# Patient Record
Sex: FEMALE | Race: WHITE | NOT HISPANIC OR LATINO | Employment: UNEMPLOYED | URBAN - METROPOLITAN AREA
[De-identification: names, ages, dates, MRNs, and addresses within clinical notes are randomized per-mention and may not be internally consistent; named-entity substitution may affect disease eponyms.]

---

## 2017-02-27 ENCOUNTER — ALLSCRIPTS OFFICE VISIT (OUTPATIENT)
Dept: OTHER | Facility: OTHER | Age: 22
End: 2017-02-27

## 2017-02-27 LAB — S PYO AG THROAT QL: NEGATIVE

## 2017-02-28 LAB — S PYO AG THROAT QL: NEGATIVE

## 2017-03-21 ENCOUNTER — ALLSCRIPTS OFFICE VISIT (OUTPATIENT)
Dept: OTHER | Facility: OTHER | Age: 22
End: 2017-03-21

## 2017-06-01 ENCOUNTER — ALLSCRIPTS OFFICE VISIT (OUTPATIENT)
Dept: OTHER | Facility: OTHER | Age: 22
End: 2017-06-01

## 2017-08-25 ENCOUNTER — ALLSCRIPTS OFFICE VISIT (OUTPATIENT)
Dept: OTHER | Facility: OTHER | Age: 22
End: 2017-08-25

## 2017-08-25 DIAGNOSIS — F41.9 ANXIETY DISORDER: ICD-10-CM

## 2017-08-25 DIAGNOSIS — E55.9 VITAMIN D DEFICIENCY: ICD-10-CM

## 2017-08-25 DIAGNOSIS — R51 HEADACHE(784.0): ICD-10-CM

## 2017-08-25 DIAGNOSIS — G43.019 INTRACTABLE MIGRAINE WITHOUT AURA AND WITHOUT STATUS MIGRAINOSUS: ICD-10-CM

## 2017-08-25 DIAGNOSIS — G47.00 INSOMNIA: ICD-10-CM

## 2017-08-25 DIAGNOSIS — G43.709 CHRONIC MIGRAINE WITHOUT AURA WITHOUT STATUS MIGRAINOSUS, NOT INTRACTABLE: ICD-10-CM

## 2017-08-26 ENCOUNTER — TRANSCRIBE ORDERS (OUTPATIENT)
Dept: LAB | Facility: HOSPITAL | Age: 22
End: 2017-08-26

## 2017-08-26 ENCOUNTER — APPOINTMENT (OUTPATIENT)
Dept: LAB | Facility: HOSPITAL | Age: 22
End: 2017-08-26
Attending: PSYCHIATRY & NEUROLOGY
Payer: COMMERCIAL

## 2017-08-26 DIAGNOSIS — G47.00 INSOMNIA: ICD-10-CM

## 2017-08-26 DIAGNOSIS — G43.709 CHRONIC MIGRAINE WITHOUT AURA WITHOUT STATUS MIGRAINOSUS, NOT INTRACTABLE: ICD-10-CM

## 2017-08-26 DIAGNOSIS — E55.9 VITAMIN D DEFICIENCY: ICD-10-CM

## 2017-08-26 DIAGNOSIS — F41.9 ANXIETY DISORDER: ICD-10-CM

## 2017-08-26 DIAGNOSIS — R51 HEADACHE(784.0): ICD-10-CM

## 2017-08-26 DIAGNOSIS — G43.019 INTRACTABLE MIGRAINE WITHOUT AURA AND WITHOUT STATUS MIGRAINOSUS: ICD-10-CM

## 2017-08-26 LAB
25(OH)D3 SERPL-MCNC: 26.2 NG/ML (ref 30–100)
HCYS SERPL-SCNC: 12 UMOL/L (ref 3.7–11.2)
TSH SERPL DL<=0.05 MIU/L-ACNC: 1.27 UIU/ML (ref 0.36–3.74)
VIT B12 SERPL-MCNC: 261 PG/ML (ref 100–900)

## 2017-08-26 PROCEDURE — 82607 VITAMIN B-12: CPT

## 2017-08-26 PROCEDURE — 84443 ASSAY THYROID STIM HORMONE: CPT

## 2017-08-26 PROCEDURE — 82306 VITAMIN D 25 HYDROXY: CPT

## 2017-08-26 PROCEDURE — 36415 COLL VENOUS BLD VENIPUNCTURE: CPT

## 2017-08-26 PROCEDURE — 83090 ASSAY OF HOMOCYSTEINE: CPT

## 2018-01-12 VITALS
DIASTOLIC BLOOD PRESSURE: 64 MMHG | OXYGEN SATURATION: 98 % | HEART RATE: 101 BPM | SYSTOLIC BLOOD PRESSURE: 124 MMHG | HEIGHT: 65 IN

## 2018-01-13 VITALS
DIASTOLIC BLOOD PRESSURE: 70 MMHG | OXYGEN SATURATION: 99 % | HEART RATE: 84 BPM | SYSTOLIC BLOOD PRESSURE: 110 MMHG | WEIGHT: 186.38 LBS | BODY MASS INDEX: 31.05 KG/M2 | HEIGHT: 65 IN

## 2018-01-29 ENCOUNTER — OFFICE VISIT (OUTPATIENT)
Dept: NEUROLOGY | Facility: CLINIC | Age: 23
End: 2018-01-29
Payer: COMMERCIAL

## 2018-01-29 VITALS
HEART RATE: 104 BPM | SYSTOLIC BLOOD PRESSURE: 139 MMHG | RESPIRATION RATE: 14 BRPM | WEIGHT: 175 LBS | BODY MASS INDEX: 29.88 KG/M2 | HEIGHT: 64 IN | DIASTOLIC BLOOD PRESSURE: 77 MMHG

## 2018-01-29 DIAGNOSIS — G47.09 OTHER INSOMNIA: ICD-10-CM

## 2018-01-29 DIAGNOSIS — G43.709 CHRONIC MIGRAINE WITHOUT AURA WITHOUT STATUS MIGRAINOSUS, NOT INTRACTABLE: Primary | ICD-10-CM

## 2018-01-29 PROCEDURE — 99214 OFFICE O/P EST MOD 30 MIN: CPT | Performed by: PHYSICIAN ASSISTANT

## 2018-01-29 NOTE — PATIENT INSTRUCTIONS
Preventative: If headache start to worsen again, you will restart the magnesium 500 mg a day in the B2 400 mg a day  However, since you only have 1-2 migraines a month, you do not need to be on a preventative medication at this time    Supportive: May use Tylenol at the onset of a headache  Limit to less than 3 times a week  If this becomes less effective, please call us to try something else    Melatonin as needed for sleep    The patient was counseled regarding:  Discussed side effects of all medications prescribed today to the patient in detail  Patient education was completed today and we also discussed precautions for rebound headaches  When patient has a moderate to severe headache, they should seek rest, initiate relaxation and apply cold compresses to the head  1  Maintain regular sleep schedule  Adults need at least 7-8 hours of uninterrupted a night  2  Limit over the counter medications such as Tylenol, Ibuprofen, Aleve, Excedrin  (No more than 3 times a week)  3  Maintain headache diary  We discussed an ANITRA for a smart phone is "Migraine eDiary"  4  Limit caffeine to 1-2 cups a day or less  5  Avoid dietary trigger  (list given to the patient and reviewed with them)  6  Patient is to have regular frequent meals to prevent headache onset  6   Please drink at least 64 ounces of water a day to help remain hydrated  In addition we reviewed that some over-the-counter supplements may be used to decrease migraines and intensity of migraines  1   Magnesium Oxide 400-500 mg a day  If any diarrhea or upset stomach, decrease dose as tolerated  2  B2 400 mg a day  May take once a day or take 200 mg twice a day    We did discuss that this supplement will change the color of the urine to fluorescent yellow no matter how hydrated

## 2018-01-29 NOTE — ASSESSMENT & PLAN NOTE
Preventative: If headache start to worsen again, you will restart the magnesium 500 mg a day in the B2 400 mg a day  However, since you only have 1-2 migraines a month, you do not need to be on a preventative medication at this time    Supportive: May use Tylenol at the onset of a headache  Limit to less than 3 times a week      If this becomes less effective, please call us to try something else

## 2018-01-29 NOTE — PROGRESS NOTES
Patient ID: Divine Howell is a 25 y o  female  Assessment/Plan:    Chronic migraine without aura, not intractable  Preventative: If headache start to worsen again, you will restart the magnesium 500 mg a day in the B2 400 mg a day  However, since you only have 1-2 migraines a month, you do not need to be on a preventative medication at this time    Supportive: May use Tylenol at the onset of a headache  Limit to less than 3 times a week  If this becomes less effective, please call us to try something else    Other insomnia   Use melatonin as needed to help with falling asleep  May increase up to 5 mg if needed  Use only as needed         Problem List Items Addressed This Visit        Cardiovascular and Mediastinum    Chronic migraine without aura, not intractable - Primary     Preventative: If headache start to worsen again, you will restart the magnesium 500 mg a day in the B2 400 mg a day  However, since you only have 1-2 migraines a month, you do not need to be on a preventative medication at this time    Supportive: May use Tylenol at the onset of a headache  Limit to less than 3 times a week  If this becomes less effective, please call us to try something else            Other    Other insomnia      Use melatonin as needed to help with falling asleep  May increase up to 5 mg if needed  Use only as needed                Follow up in 2 years  Call with any changes or worsening    Subjective:    HPI    We had the pleasure of evaluating Kota Bains in neurological follow up today  As you know she is a 25year old right handed female  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[employer] and now want to go into education and is in a Master's program at [employer]  Headaches: Small patch of pain on the left parietal region   She noted she could not sleep on that side and could not pull her hair up and if she touched it by mistake she would could tingling sensation  It lasted for a month and then dissipated  After last visit, did take the Magnesium and B2 which decreased her heaches to 1-2 a month  She stopped the medications and headaches remained the same    Any family history of aneurysms - none  Anyhistory of migraine headaches - maternal grandmother   What is your current pain level - 0/10  Headaches started at what age -Middle school but her severe headaches started to get more frequent in March of 2017  Any accident or injury prior to onset of headaches - none  How often do the headaches occur - 1-2 month  What time of the day do the headaches start - varies  How long do the headaches last - 6 hours  Are you ever headache free - yes  Where are they located - it can be anywhere  What is the intensity of pain - 4-5 and 8/10  Any warning prior to headache and how long do they last - none  Describe your usual headache - Throbbing, Pounding    Associated symptoms:            Decrease of appetite, nausea            Photophobia, phonophobia           Problem with concentration           light-headed or dizzy, stiff or sore neck,            prefer to be alone and in a dark room, unable to work  Headaches are worse if the patient: cough, sneeze, bending over  Any trigger: not sure  Are you current pregnant or planning on getting pregnant? Not at this time  Any time of the year do headaches occur more frequently - not sure    Have you seen someone else for headaches or pain - pcp  Have you had trigger point injection performed and how often - no  Have you had Botox injection performed and how often - no  Have you had epidural injections or transforaminal injections performed - no  What medications do you take or have you taken for your headaches?   Preventative: Magnesium, B2  Abortive: Tylenol, Compazine  Any Medical/Alternative Treatments used in the past for headaches: none       The following portions of the patient's history were reviewed and updated as appropriate: allergies, current medications, past family history, past medical history, past social history, past surgical history and problem list          Objective:    Blood pressure 139/77, pulse 104, resp  rate 14, height 5' 4" (1 626 m), weight 79 4 kg (175 lb)  Physical Exam   Constitutional: She appears well-developed and well-nourished  HENT:   Head: Normocephalic and atraumatic  Eyes: EOM are normal  Pupils are equal, round, and reactive to light  Neck: Normal range of motion  Cardiovascular: Normal rate  Pulmonary/Chest: Effort normal    Musculoskeletal: Normal range of motion  Neurological: She has normal strength  Gait and coordination normal    Reflex Scores:       Bicep reflexes are 2+ on the right side and 2+ on the left side  Brachioradialis reflexes are 2+ on the right side and 2+ on the left side  Patellar reflexes are 2+ on the right side and 3+ on the left side  Achilles reflexes are 2+ on the right side and 2+ on the left side  Skin: Skin is warm and dry  Psychiatric: She has a normal mood and affect  Her speech is normal    Nursing note and vitals reviewed  Neurological Exam    Mental Status  The patient is alert and oriented to person, place, time, and situation  Her recent and remote memory are normal  She has no visuospatial neglect  Her speech is normal  Her language is fluent with no aphasia  She has normal attention span and concentration  She has a normal fund of knowledge  Cranial Nerves    CN II: The patient's visual acuity and visual fields are normal   CN III, IV, VI: The patient's pupils are equally round and reactive to light and ocular movements are normal   CN V: The patient has normal facial sensation  CN VII:  The patient has symmetric facial movement  CN VIII:  The patient's hearing is normal   CN IX, X: The patient has symmetric palate movement and normal gag reflex    CN XI: The patient's shoulder shrug strength is normal   CN XII: The patient's tongue is midline without atrophy or fasciculations  Motor  The patient has normal muscle bulk throughout  Her overall muscle tone is normal throughout  Her strength is 5/5 throughout all four extremities  Sensory  The patient's sensation is normal in all four extremities  She has normal cortical sensation    Reflexes  Deep tendon reflexes are 2+ and symmetric except as noted  Right                     Left  Brachioradialis                      2+                         2+  Biceps                                   2+                         2+  Patellar                                 2+                         3+  Achilles                                2+                         2+    Gait and Coordination  The patient has normal gait and station and normal casual, toe, heel, and tandem gait  She has normal coordination bilaterally  ROS:    Review of Systems   Constitutional: Negative  Negative for appetite change and fever  HENT: Negative  Negative for hearing loss, tinnitus, trouble swallowing and voice change  Eyes: Negative  Negative for photophobia and pain  Respiratory: Negative  Negative for shortness of breath  Cardiovascular: Negative  Negative for palpitations  Gastrointestinal: Negative  Negative for nausea and vomiting  Endocrine: Negative  Negative for cold intolerance and heat intolerance  Genitourinary: Negative  Negative for dysuria, frequency and urgency  Musculoskeletal: Negative  Negative for myalgias and neck pain  Skin: Negative  Negative for rash  Neurological: Positive for headaches  Negative for dizziness, tremors, seizures, syncope, facial asymmetry, speech difficulty, weakness, light-headedness and numbness  Hematological: Negative  Does not bruise/bleed easily  Psychiatric/Behavioral: Negative  Negative for confusion, hallucinations and sleep disturbance

## 2018-01-29 NOTE — ASSESSMENT & PLAN NOTE
Use melatonin as needed to help with falling asleep  May increase up to 5 mg if needed    Use only as needed

## 2018-08-14 ENCOUNTER — TELEPHONE (OUTPATIENT)
Dept: INTERNAL MEDICINE CLINIC | Facility: CLINIC | Age: 23
End: 2018-08-14

## 2018-08-21 ENCOUNTER — CLINICAL SUPPORT (OUTPATIENT)
Dept: INTERNAL MEDICINE CLINIC | Facility: CLINIC | Age: 23
End: 2018-08-21
Payer: COMMERCIAL

## 2018-08-21 DIAGNOSIS — Z11.1 SCREENING FOR TUBERCULOSIS: Primary | ICD-10-CM

## 2018-08-23 ENCOUNTER — CLINICAL SUPPORT (OUTPATIENT)
Dept: INTERNAL MEDICINE CLINIC | Facility: CLINIC | Age: 23
End: 2018-08-23
Payer: COMMERCIAL

## 2018-08-23 DIAGNOSIS — Z11.1 ENCOUNTER FOR PPD SKIN TEST READING: Primary | ICD-10-CM

## 2018-08-23 LAB
INDURATION: 0 MM
TB SKIN TEST: NEGATIVE

## 2018-08-23 PROCEDURE — 86580 TB INTRADERMAL TEST: CPT

## 2019-01-24 ENCOUNTER — OFFICE VISIT (OUTPATIENT)
Dept: INTERNAL MEDICINE CLINIC | Facility: CLINIC | Age: 24
End: 2019-01-24
Payer: COMMERCIAL

## 2019-01-24 VITALS
OXYGEN SATURATION: 98 % | SYSTOLIC BLOOD PRESSURE: 112 MMHG | DIASTOLIC BLOOD PRESSURE: 64 MMHG | HEART RATE: 97 BPM | BODY MASS INDEX: 29.53 KG/M2 | TEMPERATURE: 99.2 F | WEIGHT: 173 LBS | HEIGHT: 64 IN

## 2019-01-24 DIAGNOSIS — R10.13 DYSPEPSIA: Primary | ICD-10-CM

## 2019-01-24 DIAGNOSIS — G43.709 CHRONIC MIGRAINE WITHOUT AURA WITHOUT STATUS MIGRAINOSUS, NOT INTRACTABLE: ICD-10-CM

## 2019-01-24 PROCEDURE — 3008F BODY MASS INDEX DOCD: CPT | Performed by: INTERNAL MEDICINE

## 2019-01-24 PROCEDURE — 99214 OFFICE O/P EST MOD 30 MIN: CPT | Performed by: INTERNAL MEDICINE

## 2019-01-24 NOTE — PROGRESS NOTES
Assessment/Plan: This could be viral or a reaction to something she are recently  Discussed a trial of Zantac 75mg BID for the next week  Ulcer free diet discussed  Call If not improving  She does not take NSAIDs for her migraines, only Tylenol which she has not needed to take in a while until yesterday     Problem List Items Addressed This Visit        Cardiovascular and Mediastinum    Chronic migraine without aura, not intractable      Other Visit Diagnoses     Dyspepsia    -  Primary            Subjective:      Patient ID: Imelda Ulloa is a 21 y o  female  HPI  2 days ago experienced a sharp pain over umbilicus lasting a few seconds  The same pain work her up the following morning which has continued, comes in waves  +nausea worse with food  No vomiting  Bowel movements are regular, no blood in the stool  Gets occasional heartburn  LMP after the New Year  She has not had migraines in a while but had one last night and took Tylenol  Started tutoring classes 2 days ago but denies feeling stressed/anxious about it  Denies changes in her diet    The following portions of the patient's history were reviewed and updated as appropriate: allergies, past family history, past medical history, past social history, past surgical history and problem list     Review of Systems   Constitutional: Negative for fatigue, fever and unexpected weight change  HENT: Negative for ear pain, hearing loss, sinus pain, sinus pressure and sore throat  Respiratory: Negative for cough, shortness of breath and wheezing  Cardiovascular: Negative for chest pain, palpitations and leg swelling  Gastrointestinal: Positive for abdominal pain and nausea  Negative for constipation, diarrhea and vomiting  See hpi   Genitourinary: Negative for difficulty urinating and dysuria  Neurological: Positive for headaches  Negative for dizziness           Objective:      /64   Pulse 97   Temp 99 2 °F (37 3 °C)   Ht 5' 4" (1 626 m)   Wt 78 5 kg (173 lb)   SpO2 98%   BMI 29 70 kg/m²          Physical Exam   Constitutional: She is oriented to person, place, and time  She appears well-developed and well-nourished  HENT:   Head: Normocephalic and atraumatic  Right Ear: External ear normal    Left Ear: External ear normal    Mouth/Throat: Oropharynx is clear and moist    Eyes: Conjunctivae are normal    Neck: Neck supple  Cardiovascular: Normal rate, regular rhythm and normal heart sounds  No murmur heard  Pulmonary/Chest: Effort normal and breath sounds normal  No respiratory distress  She has no wheezes  She has no rales  Abdominal: Soft  Bowel sounds are normal  She exhibits no distension and no mass  There is no tenderness  There is no rebound and no guarding  Musculoskeletal: Normal range of motion  Neurological: She is alert and oriented to person, place, and time  Skin: Skin is warm and dry  Psychiatric: She has a normal mood and affect   Her behavior is normal  Judgment and thought content normal

## 2019-09-17 ENCOUNTER — OFFICE VISIT (OUTPATIENT)
Dept: URGENT CARE | Facility: CLINIC | Age: 24
End: 2019-09-17
Payer: COMMERCIAL

## 2019-09-17 VITALS
SYSTOLIC BLOOD PRESSURE: 130 MMHG | DIASTOLIC BLOOD PRESSURE: 84 MMHG | TEMPERATURE: 98.5 F | WEIGHT: 179.8 LBS | OXYGEN SATURATION: 99 % | BODY MASS INDEX: 30.7 KG/M2 | HEIGHT: 64 IN | RESPIRATION RATE: 18 BRPM | HEART RATE: 106 BPM

## 2019-09-17 DIAGNOSIS — R05.9 COUGH: Primary | ICD-10-CM

## 2019-09-17 PROCEDURE — 99213 OFFICE O/P EST LOW 20 MIN: CPT | Performed by: PHYSICIAN ASSISTANT

## 2019-09-17 RX ORDER — PREDNISONE 10 MG/1
TABLET ORAL
Qty: 21 TABLET | Refills: 0 | Status: SHIPPED | OUTPATIENT
Start: 2019-09-17 | End: 2020-05-27 | Stop reason: ALTCHOICE

## 2019-09-17 NOTE — PROGRESS NOTES
[facility]        NAME: Armando Brown is a 25 y o  female  : 1995    MRN: [id number]  DATE: 2019  TIME: 5:04 PM    Assessment and Plan   Cough [R05]  1  Cough  predniSONE 10 mg tablet       Patient Instructions     Take prednisone as directed with food  Follow up with PCP in 3-5 days  Proceed to  ER if symptoms worsen or fever/chills/night sweats develop    Chief Complaint     Chief Complaint   Patient presents with    Shortness of Breath     c/o getting sick 2 weeks ago (was not seen at the time, fever of 100 degrees, sore throat, productive cough), states seems like she was getting better but still with chest congestion and gets SOB with deep breathing, feels pressure, tried Mucinex with little relief, cough is NP  OTC meds used Dayquil  History of Present Illness       Patient presents with complaint of dry cough following URI a few weeks ago  Pt states that her only associated symptoms at this time is mild generalized chest tightness and feeling like she cannot take a full deep breath  Pt denies fever, chills, night sweats, chest pain, abdominal symptoms, congestion, rhinorrhea, PND, ear pain, and sore throat  Pt states that she otherwise feels well  Pt reports having hay fever but denies history of asthma  Pt reports taking mucinex and dayquil but states that they have not improved her dry cough  Review of Systems   Review of Systems   Constitutional: Negative for chills, fatigue and fever  Respiratory: Positive for cough and chest tightness  Negative for shortness of breath and wheezing  Cardiovascular: Negative for chest pain and palpitations  Musculoskeletal: Negative for myalgias  Skin: Negative for color change, rash and wound  Neurological: Negative for headaches  All other systems reviewed and are negative          Current Medications       Current Outpatient Medications:     predniSONE 10 mg tablet, Take 6 tabs on day 1, 5 tabs on day 2, 4 tabs on day 3, 3 tabs on day 4, 2 tabs on day 5, and 1 tab on day 6, Disp: 21 tablet, Rfl: 0    Current Allergies     Allergies as of 09/17/2019 - Reviewed 09/17/2019   Allergen Reaction Noted    Other Allergic Rhinitis 03/11/2013    Penicillins Rash 03/11/2013            The following portions of the patient's history were reviewed and updated as appropriate: allergies, current medications, past family history, past medical history, past social history, past surgical history and problem list      History reviewed  No pertinent past medical history  Past Surgical History:   Procedure Laterality Date    FOOT SURGERY      OTHER SURGICAL HISTORY      L Foot Sx       Family History   Problem Relation Age of Onset    Diabetes Father     Thyroid disease Paternal Grandfather     Cancer Paternal Grandfather          Medications have been verified  Objective   /84 (BP Location: Right arm)   Pulse (!) 106   Temp 98 5 °F (36 9 °C) (Tympanic)   Resp 18   Ht 5' 4" (1 626 m)   Wt 81 6 kg (179 lb 12 8 oz)   LMP 09/01/2019 (Within Days)   SpO2 99%   BMI 30 86 kg/m²        Physical Exam     Physical Exam   Constitutional: She is oriented to person, place, and time  She appears well-developed and well-nourished  Non-toxic appearance  She does not appear ill  No distress  Appears well, friendly, conversational   HENT:   Head: Normocephalic and atraumatic  Mouth/Throat: Oropharynx is clear and moist  No oropharyngeal exudate or posterior oropharyngeal edema  Eyes: Pupils are equal, round, and reactive to light  Conjunctivae and EOM are normal    Neck: Normal range of motion  Neck supple  No tracheal deviation present  No thyromegaly present  Cardiovascular: Normal rate, regular rhythm and normal heart sounds  Pulmonary/Chest: Effort normal and breath sounds normal  No accessory muscle usage  No respiratory distress  She has no decreased breath sounds  She has no wheezes   She has no rhonchi  She has no rales  She exhibits no mass, no tenderness, no laceration and no crepitus  Musculoskeletal: Normal range of motion  Right lower leg: Normal         Left lower leg: Normal    Lymphadenopathy:     She has no cervical adenopathy  Neurological: She is alert and oriented to person, place, and time  No cranial nerve deficit or sensory deficit  Skin: Skin is warm and dry  Capillary refill takes less than 2 seconds  No rash noted  She is not diaphoretic  No erythema  Psychiatric: She has a normal mood and affect  Her behavior is normal  Thought content normal    Nursing note and vitals reviewed

## 2020-01-28 ENCOUNTER — TELEPHONE (OUTPATIENT)
Dept: NEUROLOGY | Facility: CLINIC | Age: 25
End: 2020-01-28

## 2020-01-28 NOTE — TELEPHONE ENCOUNTER
Lmom for pt to call back, re: appt opening in [facility]/ Jose Eduardo for 1/29/20 @ 8:15am or 10:30 am

## 2020-02-26 ENCOUNTER — OFFICE VISIT (OUTPATIENT)
Dept: URGENT CARE | Facility: CLINIC | Age: 25
End: 2020-02-26
Payer: COMMERCIAL

## 2020-02-26 VITALS
RESPIRATION RATE: 19 BRPM | HEIGHT: 64 IN | TEMPERATURE: 98.9 F | OXYGEN SATURATION: 99 % | DIASTOLIC BLOOD PRESSURE: 91 MMHG | HEART RATE: 112 BPM | BODY MASS INDEX: 31.58 KG/M2 | SYSTOLIC BLOOD PRESSURE: 151 MMHG | WEIGHT: 185 LBS

## 2020-02-26 DIAGNOSIS — H10.32 ACUTE CONJUNCTIVITIS OF LEFT EYE, UNSPECIFIED ACUTE CONJUNCTIVITIS TYPE: Primary | ICD-10-CM

## 2020-02-26 PROCEDURE — 99213 OFFICE O/P EST LOW 20 MIN: CPT | Performed by: PHYSICIAN ASSISTANT

## 2020-02-26 RX ORDER — POLYMYXIN B SULFATE AND TRIMETHOPRIM 1; 10000 MG/ML; [USP'U]/ML
1 SOLUTION OPHTHALMIC EVERY 4 HOURS
Qty: 10 ML | Refills: 0 | Status: SHIPPED | OUTPATIENT
Start: 2020-02-26 | End: 2020-03-02

## 2020-02-26 NOTE — PROGRESS NOTES
[facility]        NAME: Nicolle Curran is a 25 y o  female  : 1995    MRN: [id number]  DATE: 2020  TIME: 7:06 PM    Assessment and Plan   Acute conjunctivitis of left eye, unspecified acute conjunctivitis type [H10 32]  1  Acute conjunctivitis of left eye, unspecified acute conjunctivitis type  polymyxin b-trimethoprim (POLYTRIM) ophthalmic solution     Continue warm compresses and massage  See eye doctor if symptoms do not resolve with the eye drops  Patient Instructions   Patient Instructions   Blocked Tear Duct   WHAT YOU NEED TO KNOW:   The tear duct is a small tube that helps your eye drain  A blocked tear duct means your tears do not drain correctly  When the tear duct becomes blocked, you may be at higher risk for eye infections  DISCHARGE INSTRUCTIONS:   Return to the emergency department if:   · The swelling spreads to your cheek or nose  · You have trouble breathing  Contact your healthcare provider or ophthalmologist if:   · You have a red or blue bump on the inside corner of your eye  · The white part of your eye is red  · Your eye starts draining more pus  · Your eye does not improve with treatment  · You have questions or concerns about your condition or care  Clean and massage your eye 2 to 3 times every day or as directed:  Massage helps unblock the tear duct  This can decrease pain and swelling, and prevent an eye infection:  · Wash your hands  · Wet a soft washcloth with warm water  Gently wipe any pus or dried crust out of your eye  · Place a warm compress on your eye  A warm compress can help decrease pain  It can also make it easier to unblock the tear duct  Use a small towel or gauze dipped in warm water  Leave the compress in place for 5 minutes  · Place your index finger on the side of your nose, near your eye  Use a mirror to help you find the correct place       · Press gently and slide your finger down toward the corner of your nose  You may see pus or fluid drain from the inside corner of your eye  This is normal      · Wipe away any pus or fluid that drains from the eye  Wash your hands  Follow up with your healthcare provider or ophthalmologist as directed:  Write down your questions so you remember to ask them during your visits  © 2017 [employer] Information is for End User's use only and may not be sold, redistributed or otherwise used for commercial purposes  All illustrations and images included in CareNotes® are the copyrighted property of [employer]  or [employer]esto [employer]  The above information is an  only  It is not intended as medical advice for individual conditions or treatments  Talk to your doctor, nurse or pharmacist before following any medical regimen to see if it is safe and effective for you  Proceed to  ER if symptoms worsen  Chief Complaint     Chief Complaint   Patient presents with    Eye Problem     Pt has eye irritation in the left eye, She states she had a blocked tear duct several years ago  She is experiencing same symptoms for the past two weeks where it is also consistently tearing and has discharge  History of Present Illness       Patient presents for evaluation of left eye irritation, tearing and crusting for the past 2 weeks  Patient denies any injury to the eye  She is not wearing contacts currently  She reports she had history of blocked tear duct in the past in some of the symptoms seem similar  She denies any eye pain, photophobia, headache, dizziness, fever  Review of Systems   Review of Systems   Constitutional: Negative for chills and fever  HENT: Negative  Eyes: Positive for discharge and redness  Negative for photophobia, pain, itching and visual disturbance  Respiratory: Negative  Cardiovascular: Negative  Skin: Negative  Neurological: Negative            Current Medications Current Outpatient Medications:     polymyxin b-trimethoprim (POLYTRIM) ophthalmic solution, Administer 1 drop into the left eye every 4 (four) hours for 5 days, Disp: 10 mL, Rfl: 0    predniSONE 10 mg tablet, Take 6 tabs on day 1, 5 tabs on day 2, 4 tabs on day 3, 3 tabs on day 4, 2 tabs on day 5, and 1 tab on day 6 (Patient not taking: Reported on 2/26/2020), Disp: 21 tablet, Rfl: 0    Current Allergies     Allergies as of 02/26/2020 - Reviewed 02/26/2020   Allergen Reaction Noted    Other Allergic Rhinitis 03/11/2013    Penicillins Rash 03/11/2013            The following portions of the patient's history were reviewed and updated as appropriate: allergies, current medications, past family history, past medical history, past social history, past surgical history and problem list      History reviewed  No pertinent past medical history  Past Surgical History:   Procedure Laterality Date    FOOT SURGERY      OTHER SURGICAL HISTORY      L Foot Sx       Family History   Problem Relation Age of Onset    Diabetes Father     Thyroid disease Paternal Grandfather     Cancer Paternal Grandfather          Medications have been verified  Objective   /91   Pulse (!) 112   Temp 98 9 °F (37 2 °C) (Tympanic)   Resp 19   Ht 5' 4" (1 626 m)   Wt 83 9 kg (185 lb)   SpO2 99%   BMI 31 76 kg/m²        Physical Exam     Physical Exam   Constitutional: She appears well-developed  No distress  HENT:   Nose: Nose normal    Mouth/Throat: Oropharynx is clear and moist    Eyes: EOM and lids are normal  Left eye exhibits discharge  Left conjunctiva is injected  Cardiovascular: Normal rate and regular rhythm  Pulmonary/Chest: Effort normal and breath sounds normal    Skin: Skin is warm and dry  No periorbital swelling or erythema    Vitals reviewed

## 2020-02-27 NOTE — PATIENT INSTRUCTIONS
Blocked Tear Duct   WHAT YOU NEED TO KNOW:   The tear duct is a small tube that helps your eye drain  A blocked tear duct means your tears do not drain correctly  When the tear duct becomes blocked, you may be at higher risk for eye infections  DISCHARGE INSTRUCTIONS:   Return to the emergency department if:   · The swelling spreads to your cheek or nose  · You have trouble breathing  Contact your healthcare provider or ophthalmologist if:   · You have a red or blue bump on the inside corner of your eye  · The white part of your eye is red  · Your eye starts draining more pus  · Your eye does not improve with treatment  · You have questions or concerns about your condition or care  Clean and massage your eye 2 to 3 times every day or as directed:  Massage helps unblock the tear duct  This can decrease pain and swelling, and prevent an eye infection:  · Wash your hands  · Wet a soft washcloth with warm water  Gently wipe any pus or dried crust out of your eye  · Place a warm compress on your eye  A warm compress can help decrease pain  It can also make it easier to unblock the tear duct  Use a small towel or gauze dipped in warm water  Leave the compress in place for 5 minutes  · Place your index finger on the side of your nose, near your eye  Use a mirror to help you find the correct place  · Press gently and slide your finger down toward the corner of your nose  You may see pus or fluid drain from the inside corner of your eye  This is normal      · Wipe away any pus or fluid that drains from the eye  Wash your hands  Follow up with your healthcare provider or ophthalmologist as directed:  Write down your questions so you remember to ask them during your visits  © 2017 [employer] Information is for End User's use only and may not be sold, redistributed or otherwise used for commercial purposes   All illustrations and images included in CareNotes® are the copyrighted property of [employer]  or Ernesto Mcwilliams  The above information is an  only  It is not intended as medical advice for individual conditions or treatments  Talk to your doctor, nurse or pharmacist before following any medical regimen to see if it is safe and effective for you

## 2020-05-22 ENCOUNTER — TELEPHONE (OUTPATIENT)
Dept: INTERNAL MEDICINE CLINIC | Facility: CLINIC | Age: 25
End: 2020-05-22

## 2020-05-26 VITALS — HEIGHT: 64 IN | BODY MASS INDEX: 31.58 KG/M2 | WEIGHT: 185 LBS

## 2020-05-27 ENCOUNTER — TELEMEDICINE (OUTPATIENT)
Dept: INTERNAL MEDICINE CLINIC | Facility: CLINIC | Age: 25
End: 2020-05-27
Payer: COMMERCIAL

## 2020-05-27 DIAGNOSIS — Z20.828 SARS-ASSOCIATED CORONAVIRUS EXPOSURE: Primary | ICD-10-CM

## 2020-05-27 PROCEDURE — 99213 OFFICE O/P EST LOW 20 MIN: CPT | Performed by: INTERNAL MEDICINE

## 2020-05-28 ENCOUNTER — APPOINTMENT (OUTPATIENT)
Dept: LAB | Facility: HOSPITAL | Age: 25
End: 2020-05-28
Payer: COMMERCIAL

## 2020-05-28 DIAGNOSIS — Z20.828 SARS-ASSOCIATED CORONAVIRUS EXPOSURE: ICD-10-CM

## 2020-05-28 PROCEDURE — 36415 COLL VENOUS BLD VENIPUNCTURE: CPT

## 2020-05-28 PROCEDURE — 86769 SARS-COV-2 COVID-19 ANTIBODY: CPT

## 2020-05-29 LAB — SARS-COV-2 IGG SERPL QL IA: NEGATIVE

## 2020-08-04 ENCOUNTER — OFFICE VISIT (OUTPATIENT)
Dept: INTERNAL MEDICINE CLINIC | Facility: CLINIC | Age: 25
End: 2020-08-04
Payer: COMMERCIAL

## 2020-08-04 VITALS
OXYGEN SATURATION: 99 % | DIASTOLIC BLOOD PRESSURE: 68 MMHG | BODY MASS INDEX: 33.16 KG/M2 | HEART RATE: 99 BPM | WEIGHT: 194.2 LBS | TEMPERATURE: 97.6 F | HEIGHT: 64 IN | SYSTOLIC BLOOD PRESSURE: 122 MMHG

## 2020-08-04 DIAGNOSIS — Z00.00 WELLNESS EXAMINATION: Primary | ICD-10-CM

## 2020-08-04 DIAGNOSIS — Z23 NEED FOR VACCINATION: ICD-10-CM

## 2020-08-04 DIAGNOSIS — Z12.4 CERVICAL CANCER SCREENING: ICD-10-CM

## 2020-08-04 DIAGNOSIS — B36.0 TINEA VERSICOLOR: ICD-10-CM

## 2020-08-04 PROCEDURE — 99395 PREV VISIT EST AGE 18-39: CPT | Performed by: INTERNAL MEDICINE

## 2020-08-04 PROCEDURE — 3008F BODY MASS INDEX DOCD: CPT | Performed by: INTERNAL MEDICINE

## 2020-08-04 PROCEDURE — 86580 TB INTRADERMAL TEST: CPT

## 2020-08-04 PROCEDURE — 90471 IMMUNIZATION ADMIN: CPT

## 2020-08-04 PROCEDURE — 1036F TOBACCO NON-USER: CPT | Performed by: INTERNAL MEDICINE

## 2020-08-04 PROCEDURE — 90715 TDAP VACCINE 7 YRS/> IM: CPT

## 2020-08-04 RX ORDER — KETOCONAZOLE 20 MG/ML
SHAMPOO TOPICAL
Qty: 120 ML | Refills: 3 | Status: SHIPPED | OUTPATIENT
Start: 2020-08-04 | End: 2020-12-02 | Stop reason: ALTCHOICE

## 2020-08-04 NOTE — PROGRESS NOTES
Assessment/Plan:  BMI Counseling: Body mass index is 33 33 kg/m²  The BMI is above normal  Nutrition recommendations include decreasing overall calorie intake  Exercise recommendations include exercising 3-5 times per week  Problem List Items Addressed This Visit     None      Visit Diagnoses     Wellness examination    -  Primary    Need for vaccination        Relevant Orders    Tdap vaccine greater than or equal to 8yo IM (Completed)    TB Skin Test (Completed)    Cervical cancer screening        Relevant Orders    Ambulatory referral to Gynecology    Tinea versicolor        Relevant Medications    ketoconazole (NIZORAL) 2 % shampoo            Subjective:      Patient ID: Jolene Edwards is a 22 y o  female  HPI  Wellness  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  Needs form completed  Feeling well overall  Trying to lose weight by healthy eating and regular exercise  Non smoker, no alcohol or drug use    The following portions of the patient's history were reviewed and updated as appropriate: allergies, current medications, past family history, past medical history, past social history, past surgical history and problem list     Review of Systems   Constitutional: Negative for chills, fatigue, fever and unexpected weight change  HENT: Negative for congestion, sinus pressure, sinus pain and sore throat  Respiratory: Negative for cough, shortness of breath and wheezing  Cardiovascular: Negative for chest pain, palpitations and leg swelling  Gastrointestinal: Negative for abdominal pain, constipation, diarrhea, nausea and vomiting  Genitourinary: Negative for difficulty urinating and menstrual problem  Musculoskeletal: Negative for arthralgias and myalgias  Skin: Positive for rash (chest, resolves with ketoconazole but keeps returning)  Neurological: Negative for dizziness and headaches     Psychiatric/Behavioral: Negative for dysphoric mood and sleep disturbance  Objective:      /68   Pulse 99   Temp 97 6 °F (36 4 °C)   Ht 5' 4"   Wt 88 1 kg (194 lb 3 2 oz)   SpO2 99%   BMI 33 33 kg/m²          Physical Exam   Constitutional: She is oriented to person, place, and time  She appears well-developed  HENT:   Head: Normocephalic and atraumatic  Eyes: Conjunctivae are normal    Neck: Neck supple  Cardiovascular: Normal rate, regular rhythm and normal heart sounds  No murmur heard  Pulmonary/Chest: Effort normal and breath sounds normal  No respiratory distress  She has no wheezes  She has no rales  Abdominal: Soft  Bowel sounds are normal  She exhibits no distension and no mass  There is no abdominal tenderness  There is no rebound and no guarding  Musculoskeletal:      Right lower leg: No edema  Left lower leg: No edema  Neurological: She is alert and oriented to person, place, and time  Skin: Skin is warm and dry  Rash (light tan patches on the chest between breasts and few on neck) noted     Psychiatric: Her behavior is normal  Judgment and thought content normal

## 2020-08-06 ENCOUNTER — CLINICAL SUPPORT (OUTPATIENT)
Dept: INTERNAL MEDICINE CLINIC | Facility: CLINIC | Age: 25
End: 2020-08-06

## 2020-08-06 DIAGNOSIS — Z11.1 ENCOUNTER FOR PPD SKIN TEST READING: Primary | ICD-10-CM

## 2020-12-02 ENCOUNTER — OFFICE VISIT (OUTPATIENT)
Dept: OBGYN CLINIC | Facility: CLINIC | Age: 25
End: 2020-12-02
Payer: COMMERCIAL

## 2020-12-02 VITALS
HEIGHT: 64 IN | BODY MASS INDEX: 31.92 KG/M2 | SYSTOLIC BLOOD PRESSURE: 122 MMHG | DIASTOLIC BLOOD PRESSURE: 80 MMHG | WEIGHT: 187 LBS

## 2020-12-02 DIAGNOSIS — Z01.419 ENCNTR FOR GYN EXAM (GENERAL) (ROUTINE) W/O ABN FINDINGS: ICD-10-CM

## 2020-12-02 DIAGNOSIS — Z12.4 CERVICAL CANCER SCREENING: ICD-10-CM

## 2020-12-02 PROCEDURE — G0145 SCR C/V CYTO,THINLAYER,RESCR: HCPCS | Performed by: PHYSICIAN ASSISTANT

## 2020-12-02 PROCEDURE — S0610 ANNUAL GYNECOLOGICAL EXAMINA: HCPCS | Performed by: PHYSICIAN ASSISTANT

## 2020-12-08 LAB
LAB AP GYN PRIMARY INTERPRETATION: NORMAL
Lab: NORMAL

## 2021-01-14 ENCOUNTER — HOSPITAL ENCOUNTER (EMERGENCY)
Facility: HOSPITAL | Age: 26
Discharge: HOME | End: 2021-01-14
Attending: EMERGENCY MEDICINE
Payer: COMMERCIAL

## 2021-01-14 VITALS
TEMPERATURE: 99.4 F | SYSTOLIC BLOOD PRESSURE: 163 MMHG | RESPIRATION RATE: 18 BRPM | DIASTOLIC BLOOD PRESSURE: 87 MMHG | HEART RATE: 118 BPM | OXYGEN SATURATION: 99 %

## 2021-01-14 DIAGNOSIS — H04.552 NASOLACRIMAL DUCT OBSTRUCTION, ACQUIRED, LEFT: Primary | ICD-10-CM

## 2021-01-14 DIAGNOSIS — H57.12 OCULAR PAIN, LEFT EYE: ICD-10-CM

## 2021-01-14 PROCEDURE — 99283 EMERGENCY DEPT VISIT LOW MDM: CPT

## 2021-01-14 PROCEDURE — 96372 THER/PROPH/DIAG INJ SC/IM: CPT

## 2021-01-14 PROCEDURE — 99284 EMERGENCY DEPT VISIT MOD MDM: CPT | Performed by: EMERGENCY MEDICINE

## 2021-01-14 RX ORDER — CIPROFLOXACIN HYDROCHLORIDE 3.5 MG/ML
2 SOLUTION/ DROPS TOPICAL ONCE
Status: COMPLETED | OUTPATIENT
Start: 2021-01-14 | End: 2021-01-14

## 2021-01-14 RX ORDER — KETOROLAC TROMETHAMINE 30 MG/ML
15 INJECTION, SOLUTION INTRAMUSCULAR; INTRAVENOUS ONCE
Status: COMPLETED | OUTPATIENT
Start: 2021-01-14 | End: 2021-01-14

## 2021-01-14 RX ORDER — TETRACAINE HYDROCHLORIDE 5 MG/ML
1 SOLUTION OPHTHALMIC ONCE
Status: COMPLETED | OUTPATIENT
Start: 2021-01-14 | End: 2021-01-14

## 2021-01-14 RX ADMIN — KETOROLAC TROMETHAMINE 15 MG: 30 INJECTION, SOLUTION INTRAMUSCULAR at 22:28

## 2021-01-14 RX ADMIN — FLUORESCEIN SODIUM 1 STRIP: 1 STRIP OPHTHALMIC at 21:44

## 2021-01-14 RX ADMIN — CIPROFLOXACIN 2 DROP: 3 SOLUTION OPHTHALMIC at 22:48

## 2021-01-14 RX ADMIN — TETRACAINE HYDROCHLORIDE 1 DROP: 5 SOLUTION OPHTHALMIC at 21:43

## 2021-01-15 NOTE — DISCHARGE INSTRUCTIONS
Take 400mg ibuprofen every 6 hours as needed for pain, take with food  You may also take tylenol 500mg-1000mg every 6 hours as needed for pain; you may take tylenol with or without ibuprofen

## 2021-01-15 NOTE — ED ATTENDING ATTESTATION
1/14/2021  IMayte MD, saw and evaluated the patient  I have discussed the patient with the resident/non-physician practitioner and agree with the resident's/non-physician practitioner's findings, Plan of Care, and MDM as documented in the resident's/non-physician practitioner's note, except where noted  All available labs and Radiology studies were reviewed  I was present for key portions of any procedure(s) performed by the resident/non-physician practitioner and I was immediately available to provide assistance  At this point I agree with the current assessment done in the Emergency Department  I have conducted an independent evaluation of this patient a history and physical is as follows:   Pt for 1 week has pain tearing in eye no contact lens wearing since the summer  NO trauma no pain with movement of eyes  Pt ahs pain in area of medial canthus and into nasal area Pt has an appointment to see ophthalmologist and has had tear duct problems in the past  PE: alert eomi perrl slit lamp exam done no cell and flare no abrasion or ulcer lids everted no fb mildly injected conjunctiva   No tenderness to palpation of sinuses MDM: will give antibiotic drops samreen Avery 48  ED Course         Critical Care Time  Procedures

## 2021-01-15 NOTE — ED PROVIDER NOTES
History  Chief Complaint   Patient presents with    Eye Problem     pt states left eye swelling/pain since weekend  pt states clear drainage and denies injury and denies any changes in vision     Patient is a 59-year-old female with a past medical history of nasolacrimal duct obstruction who presents to the ED for evaluation of left-sided ocular pain, and epiphora x 1 week  She has an appointment with her ophthalmologist tomorrow afternoon however her pain became worse this evening, despite taking 200 mg of ibuprofen she had ongoing ocular discomfort so she came to the ED for evaluation  She states her symptoms have been gradual in onset and have been worsening since onset  She states this does feel similar but more severe than her prio episode of nasolacrimal duct obstruction, for which she saw her ophthalmologist for previously  States when she had this issue before she was prescribed an unknown ophthalmic drops, and warm compresses which resolved her symptoms  She describes her pain as and irritation, and has been tearing constantly from her left eye which is clear  She denies any purulence drainage from the area, no eyelid erythema or swelling, no conjunctival injection  She denies any decrease in visual acuity, she utilizes glasses but she does not utilize any contact lenses  She denies any trauma to the eye, denies possibility of foreign body, denies any UV exposure, denies any facial rash or fever/chills, no headache, nausea vomiting, she denies any numbness, denies pain with extraocular movements, no pruritus, no involvement of the right eye  No URI symptoms, sinus pressure/pain        History provided by:  Patient   used: No        None       Past Medical History:   Diagnosis Date    Migraine        Past Surgical History:   Procedure Laterality Date    FOOT SURGERY      OTHER SURGICAL HISTORY      L Foot Sx       Family History   Problem Relation Age of Onset    Diabetes Father     Thyroid disease Paternal Grandfather     Cancer Paternal Grandfather     Leukemia Paternal Grandfather     Anxiety disorder Mother     Depression Mother      I have reviewed and agree with the history as documented  E-Cigarette/Vaping    E-Cigarette Use Never User      E-Cigarette/Vaping Substances    Nicotine No     THC No     CBD No     Flavoring No     Other No     Unknown No      Social History     Tobacco Use    Smoking status: Never Smoker    Smokeless tobacco: Never Used   Substance Use Topics    Alcohol use: Yes     Comment: socially    Drug use: No        Review of Systems   Constitutional: Negative for chills and fever  Eyes: Positive for pain  Negative for photophobia, discharge, redness, itching and visual disturbance  Gastrointestinal: Negative for nausea and vomiting  All other systems reviewed and are negative  Physical Exam  ED Triage Vitals [01/14/21 2055]   Temperature Pulse Respirations Blood Pressure SpO2   99 4 °F (37 4 °C) (!) 118 18 163/87 99 %      Temp src Heart Rate Source Patient Position - Orthostatic VS BP Location FiO2 (%)   -- Monitor Sitting Right arm --      Pain Score       6             Orthostatic Vital Signs  Vitals:    01/14/21 2055   BP: 163/87   Pulse: (!) 118   Patient Position - Orthostatic VS: Sitting       Physical Exam  Constitutional:       Appearance: Normal appearance  HENT:      Head: Normocephalic and atraumatic  Right Ear: External ear normal       Left Ear: External ear normal       Nose: Nose normal       Comments: No sinus pain with palpation over sinuses      Mouth/Throat:      Mouth: Mucous membranes are moist    Eyes:      General: Lids are normal  Lids are everted, no foreign bodies appreciated  Vision grossly intact  Gaze aligned appropriately  No allergic shiner or scleral icterus  Right eye: No foreign body, discharge or hordeolum  Left eye: No foreign body, discharge or hordeolum  Extraocular Movements: Extraocular movements intact  Right eye: Normal extraocular motion and no nystagmus  Left eye: Normal extraocular motion and no nystagmus  Conjunctiva/sclera: Conjunctivae normal       Right eye: Right conjunctiva is not injected  No chemosis, exudate or hemorrhage  Left eye: Left conjunctiva is not injected  No chemosis, exudate or hemorrhage  Pupils: Pupils are equal, round, and reactive to light  Left eye: No corneal abrasion or fluorescein uptake  Gumaro exam negative  Funduscopic exam:        Left eye: No papilledema  Slit lamp exam:     Left eye: Anterior chamber quiet  No corneal ulcer, foreign body, hyphema, hypopyon, anterior chamber flares or photophobia  Neck:      Musculoskeletal: Normal range of motion  Cardiovascular:      Rate and Rhythm: Normal rate and regular rhythm  Pulmonary:      Effort: Pulmonary effort is normal  No respiratory distress  Abdominal:      General: Abdomen is flat  There is no distension  Musculoskeletal: Normal range of motion  Skin:     General: Skin is warm and dry  Capillary Refill: Capillary refill takes less than 2 seconds  Neurological:      Mental Status: She is alert and oriented to person, place, and time  Psychiatric:         Mood and Affect: Mood normal          Behavior: Behavior normal          Thought Content:  Thought content normal          Judgment: Judgment normal          ED Medications  Medications   tetracaine 0 5 % ophthalmic solution 1 drop (1 drop Left Eye Given 1/14/21 2143)   fluorescein sodium sterile ophthalmic strip 1 strip (1 strip Left Eye Given 1/14/21 2144)   ciprofloxacin (CILOXAN) 0 3 % ophthalmic solution 2 drop (2 drops Left Eye Given 1/14/21 2248)   ketorolac (TORADOL) injection 15 mg (15 mg Intramuscular Given 1/14/21 2228)       Diagnostic Studies  Results Reviewed     None                 No orders to display         Procedures  Procedures      ED Course MDM  Number of Diagnoses or Management Options  Nasolacrimal duct obstruction, acquired, left: new and does not require workup  Ocular pain, left eye: new and does not require workup  Diagnosis management comments: 80-year-old female presenting to the ED with ocular pain  She denies any changes in visual acuity no pain with extraocular movements  There is no swelling or involvement of the eyelid, no proptosis  Differential diagnosis corneal abrasion, UV keratitis versus vs herpes keratitis; doubtful iritis given no ciliary flush no photophobia, and no change in visual acuity, doubtful endophthalmitis  Slit lamp exam normal  fluorescein exam no abrasions, no evidence of keratitis or dendrites on exam    Given normal slit lamp, normal fluorescein exam, and no changes in vision, with ongoing epiphora and pain, suspect nasolacrimal duct obstruction; there is no dacrocystitis on exam  Will give ciprofloxacin drops; patient artery has an appointment with Ophthalmology tomorrow afternoon  Amount and/or Complexity of Data Reviewed  Review and summarize past medical records: yes        Disposition  Final diagnoses:   Ocular pain, left eye   Nasolacrimal duct obstruction, acquired, left     Time reflects when diagnosis was documented in both MDM as applicable and the Disposition within this note     Time User Action Codes Description Comment    1/14/2021 10:16 PM Myesha Coates Add [H57 12] Ocular pain, left eye     1/14/2021 10:16 PM Myesha Coates Add [H04 552] Nasolacrimal duct obstruction, acquired, left     1/14/2021 10:16 PM Myesha Coates Modify [H57 12] Ocular pain, left eye     1/14/2021 10:16 PM Myesha Coates Modify [C19 309] Nasolacrimal duct obstruction, acquired, left       ED Disposition     ED Disposition Condition Date/Time Comment    Discharge Stable u Jan 14, 2021 10:17  Carol Negron discharge to home/self care              Follow-up Information Follow up With Specialties Details Why Pink Dance, MD Ophthalmology, Pediatric Ophthalmology Go in 1 day tomorrow, at your scheduled appointment [address]  [phone]            There are no discharge medications for this patient  No discharge procedures on file  PDMP Review     None           ED Provider  Attending physically available and evaluated Maribel Nguyen Meir  I managed the patient along with the ED Attending      Electronically Signed by         Joya Coffey DO  01/14/21 8667

## 2021-08-10 ENCOUNTER — NURSE TRIAGE (OUTPATIENT)
Dept: OTHER | Facility: OTHER | Age: 26
End: 2021-08-10

## 2021-08-10 NOTE — TELEPHONE ENCOUNTER
Reason for Disposition   [1] Sore throat is the only symptom AND [2] sore throat present < 48 hours    Answer Assessment - Initial Assessment Questions  1  ONSET: "When did the throat start hurting?" (Hours or days ago)      8/9/10 in the morning  2  SEVERITY: "How bad is the sore throat?" (Scale 1-10; mild, moderate or severe)    - MILD (1-3):  doesn't interfere with eating or normal activities    - MODERATE (4-7): interferes with eating some solids and normal activities    - SEVERE (8-10):  excruciating pain, interferes with most normal activities    - SEVERE DYSPHAGIA: can't swallow liquids, drooling     Rated 5/10  Unable to eat solid foods  3  STREP EXPOSURE: "Has there been any exposure to strep within the past week?" If Yes, ask: "What type of contact occurred?"       Unsure  4  VIRAL SYMPTOMS: "Are there any symptoms of a cold, such as a runny nose, cough, hoarse voice or red eyes?"      Denies  5  FEVER: "Do you have a fever?" If Yes, ask: "What is your temperature, how was it measured, and when did it start?"      Denies  6  PUS ON THE TONSILS: "Is there pus on the tonsils in the back of your throat?"    Patient had mother look and she "saw white patches on tonsils"  7  OTHER SYMPTOMS: "Do you have any other symptoms?" (e g , difficulty breathing, headache, rash)      Denies   8  PREGNANCY: "Is there any chance you are pregnant?" "When was your last menstrual period?"      Denies   LMP: 7/14/21    Protocols used: SORE THROAT-ADULT-

## 2021-08-10 NOTE — TELEPHONE ENCOUNTER
Complaint of moderated Sore throat rated 5/10 with white patches noted on tonsils  Denies fever, cough, cold symptoms  Denies SOB  Unable to eat solid food, but able to take fluids and soft diet  Will like appointment to be seen  Appointment made for 8/11/21 at   Care advice given  Informed to call back if worsening symptoms  Verbalized understanding

## 2021-08-10 NOTE — TELEPHONE ENCOUNTER
Regarding: sick appt / concerned about strep   ----- Message from [facility] JONO sent at 8/10/2021  7:10 PM EDT -----  "I am calling because I would like to get into my doctors office for a sick visit, im afraid I might have strep because it hurts more than usual and swallowing and eating, it is sore "

## 2021-08-11 ENCOUNTER — OFFICE VISIT (OUTPATIENT)
Dept: INTERNAL MEDICINE CLINIC | Facility: CLINIC | Age: 26
End: 2021-08-11
Payer: COMMERCIAL

## 2021-08-11 VITALS
BODY MASS INDEX: 29.88 KG/M2 | HEIGHT: 64 IN | WEIGHT: 175 LBS | SYSTOLIC BLOOD PRESSURE: 130 MMHG | DIASTOLIC BLOOD PRESSURE: 90 MMHG | OXYGEN SATURATION: 100 % | TEMPERATURE: 97.6 F | HEART RATE: 99 BPM

## 2021-08-11 DIAGNOSIS — J02.9 SORE THROAT: Primary | ICD-10-CM

## 2021-08-11 DIAGNOSIS — J02.0 STREP THROAT: ICD-10-CM

## 2021-08-11 DIAGNOSIS — Z88.0 PENICILLIN ALLERGY: ICD-10-CM

## 2021-08-11 PROCEDURE — U0005 INFEC AGEN DETEC AMPLI PROBE: HCPCS | Performed by: GENERAL ACUTE CARE HOSPITAL

## 2021-08-11 PROCEDURE — 99214 OFFICE O/P EST MOD 30 MIN: CPT | Performed by: GENERAL ACUTE CARE HOSPITAL

## 2021-08-11 PROCEDURE — 3008F BODY MASS INDEX DOCD: CPT | Performed by: GENERAL ACUTE CARE HOSPITAL

## 2021-08-11 PROCEDURE — U0003 INFECTIOUS AGENT DETECTION BY NUCLEIC ACID (DNA OR RNA); SEVERE ACUTE RESPIRATORY SYNDROME CORONAVIRUS 2 (SARS-COV-2) (CORONAVIRUS DISEASE [COVID-19]), AMPLIFIED PROBE TECHNIQUE, MAKING USE OF HIGH THROUGHPUT TECHNOLOGIES AS DESCRIBED BY CMS-2020-01-R: HCPCS | Performed by: GENERAL ACUTE CARE HOSPITAL

## 2021-08-11 RX ORDER — CEFADROXIL 1000 MG/1
1 TABLET ORAL DAILY
Qty: 10 TABLET | Refills: 0 | Status: SHIPPED | OUTPATIENT
Start: 2021-08-11 | End: 2021-08-21

## 2021-08-11 NOTE — ASSESSMENT & PLAN NOTE
Rapid strep is positive  Start antibiotics  Pt has hx of Penicillin allergy of rash as a baby  Will start cefadroxil

## 2021-08-11 NOTE — ASSESSMENT & PLAN NOTE
When she was a baby  Very likely she grows out of it now     Refer to allergy for penicillin allergy test

## 2021-08-11 NOTE — PROGRESS NOTES
COVID-19 Outpatient Progress Note    Assessment/Plan:    Problem List Items Addressed This Visit        Digestive    Strep throat     Rapid strep is positive  Start antibiotics  Pt has hx of Penicillin allergy of rash as a baby  Will start cefadroxil  Relevant Medications    cefadroxil (DURICEF) 1 g tablet       Other    Sore throat - Primary    Relevant Orders    Novel Coronavirus (Covid-19),PCR [facility] - Collected in Office    Penicillin allergy     When she was a baby  Very likely she grows out of it now  Refer to allergy for penicillin allergy test           Relevant Orders    Ambulatory referral to Allergy         Disposition:     I recommended the patient to come to our office to perform PCR testing for COVID-19  Will also get rapid strep    I have spent 15 minutes directly with the patient  Verification of patient location:    Patient is located in the following state in which I hold an active license [place]    Encounter provider Ziggy Turk MD    Provider located at [address] [place] [id number]    Recent Visits  No visits were found meeting these conditions  Showing recent visits within past 7 days and meeting all other requirements  Today's Visits  Date Type Provider Dept   08/11/21 Office Visit Ziggy Turk MD [address] [facility] today's visits and meeting all other requirements  Future Appointments  No visits were found meeting these conditions  Showing future appointments within next 150 days and meeting all other requirements     Subjective:   Bruno Whittaker is a 32 y o  female who is concerned about COVID-19  Patient's symptoms include sore throat       Date of symptom onset: 8/9/2021    Exposure:   Contact with a person who is under investigation (PUI) for or who is positive for COVID-19 within the last 14 days?: No    Hospitalized recently for fever and/or lower respiratory symptoms?: No Currently a healthcare worker that is involved in direct patient care?: No      Works in a special setting where the risk of COVID-19 transmission may be high? (this may include long-term care, correctional and [facility] facilities; homeless shelters; assisted-living facilities and group homes ): No      Resident in a special setting where the risk of COVID-19 transmission may be high? (this may include long-term care, correctional and [facility] facilities; homeless shelters; assisted-living facilities and group homes ): No      No results found for: SARSCOV2, [address] [facility], RICK, [facility]  Past Medical History:   Diagnosis Date    Migraine      Past Surgical History:   Procedure Laterality Date    FOOT SURGERY      OTHER SURGICAL HISTORY      L Foot Sx     Current Outpatient Medications   Medication Sig Dispense Refill    cefadroxil (DURICEF) 1 g tablet Take 1 tablet (1 g total) by mouth daily for 10 days 10 tablet 0     No current facility-administered medications for this visit  Allergies   Allergen Reactions    Other Allergic Rhinitis     Hay Fever    Penicillins Rash       Review of Systems   HENT: Positive for sore throat  Objective:    Vitals:    08/11/21 1545   BP: 130/90   Pulse: 99   Temp: 97 6 °F (36 4 °C)   TempSrc: Temporal   SpO2: 100%   Weight: 79 4 kg (175 lb)   Height: 5' 3 78" (1 62 m)       Physical Exam    VIRTUAL VISIT DISCLAIMER    Maribel Bruce verbally agrees to participate in [employer]  Pt is aware that [employer] could be limited without vital signs or the ability to perform a full hands-on physical exam  Maribel Bruce understands she or the provider may request at any time to terminate the video visit and request the patient to seek care or treatment in person

## 2021-08-11 NOTE — PATIENT INSTRUCTIONS
Your strep throat rapid test is positive  Please start antibiotics  Watch for any rash development or any allergy reaction  If so stop medication right away       Make an appointment with allergist for penicillin allergy test

## 2021-08-12 LAB — SARS-COV-2 RNA RESP QL NAA+PROBE: NEGATIVE

## 2021-09-28 ENCOUNTER — RA CDI HCC (OUTPATIENT)
Dept: OTHER | Facility: HOSPITAL | Age: 26
End: 2021-09-28

## 2021-09-28 NOTE — PROGRESS NOTES
[facility]  coding opportunities       Chart reviewed, no opportunity found: CHART REVIEWED, NO OPPORTUNITY FOUND                        Patients insurance company: [employer] (Medicare Advantage and Commercial)

## 2021-10-05 ENCOUNTER — OFFICE VISIT (OUTPATIENT)
Dept: INTERNAL MEDICINE CLINIC | Facility: CLINIC | Age: 26
End: 2021-10-05
Payer: COMMERCIAL

## 2021-10-05 VITALS
DIASTOLIC BLOOD PRESSURE: 80 MMHG | HEART RATE: 75 BPM | HEIGHT: 64 IN | OXYGEN SATURATION: 100 % | BODY MASS INDEX: 30.42 KG/M2 | WEIGHT: 178.2 LBS | SYSTOLIC BLOOD PRESSURE: 126 MMHG

## 2021-10-05 DIAGNOSIS — B36.0 TINEA VERSICOLOR: ICD-10-CM

## 2021-10-05 DIAGNOSIS — Z00.00 ANNUAL PHYSICAL EXAM: Primary | ICD-10-CM

## 2021-10-05 PROBLEM — J02.0 STREP THROAT: Status: RESOLVED | Noted: 2021-08-11 | Resolved: 2021-10-05

## 2021-10-05 PROCEDURE — 1036F TOBACCO NON-USER: CPT | Performed by: INTERNAL MEDICINE

## 2021-10-05 PROCEDURE — 3725F SCREEN DEPRESSION PERFORMED: CPT | Performed by: INTERNAL MEDICINE

## 2021-10-05 PROCEDURE — 99395 PREV VISIT EST AGE 18-39: CPT | Performed by: INTERNAL MEDICINE

## 2021-10-05 PROCEDURE — 3008F BODY MASS INDEX DOCD: CPT | Performed by: INTERNAL MEDICINE

## 2021-10-05 RX ORDER — KETOCONAZOLE 20 MG/ML
SHAMPOO TOPICAL
Qty: 120 ML | Refills: 3 | Status: SHIPPED | OUTPATIENT
Start: 2021-10-05

## 2021-10-26 ENCOUNTER — OFFICE VISIT (OUTPATIENT)
Dept: OBGYN CLINIC | Facility: CLINIC | Age: 26
End: 2021-10-26
Payer: COMMERCIAL

## 2021-10-26 VITALS — DIASTOLIC BLOOD PRESSURE: 78 MMHG | WEIGHT: 181 LBS | BODY MASS INDEX: 31.28 KG/M2 | SYSTOLIC BLOOD PRESSURE: 120 MMHG

## 2021-10-26 DIAGNOSIS — Z30.09 BIRTH CONTROL COUNSELING: Primary | ICD-10-CM

## 2021-10-26 PROCEDURE — 99213 OFFICE O/P EST LOW 20 MIN: CPT | Performed by: PHYSICIAN ASSISTANT

## 2021-10-26 RX ORDER — NORETHINDRONE ACETATE AND ETHINYL ESTRADIOL 1MG-20(21)
1 KIT ORAL DAILY
Qty: 90 TABLET | Refills: 0 | Status: SHIPPED | OUTPATIENT
Start: 2021-10-26 | End: 2021-12-08

## 2021-11-11 ENCOUNTER — TELEPHONE (OUTPATIENT)
Dept: OBGYN CLINIC | Facility: CLINIC | Age: 26
End: 2021-11-11

## 2021-12-14 ENCOUNTER — ANNUAL EXAM (OUTPATIENT)
Dept: OBGYN CLINIC | Facility: CLINIC | Age: 26
End: 2021-12-14
Payer: COMMERCIAL

## 2021-12-14 VITALS
WEIGHT: 188.6 LBS | HEIGHT: 64 IN | BODY MASS INDEX: 32.2 KG/M2 | DIASTOLIC BLOOD PRESSURE: 80 MMHG | SYSTOLIC BLOOD PRESSURE: 132 MMHG

## 2021-12-14 DIAGNOSIS — Z30.09 BIRTH CONTROL COUNSELING: ICD-10-CM

## 2021-12-14 DIAGNOSIS — Z01.419 ENCNTR FOR GYN EXAM (GENERAL) (ROUTINE) W/O ABN FINDINGS: Primary | ICD-10-CM

## 2021-12-14 PROBLEM — J02.9 SORE THROAT: Status: RESOLVED | Noted: 2021-08-11 | Resolved: 2021-12-14

## 2021-12-14 PROBLEM — G43.709 CHRONIC MIGRAINE WITHOUT AURA: Status: ACTIVE | Noted: 2017-08-25

## 2021-12-14 PROBLEM — F41.9 ANXIETY: Status: ACTIVE | Noted: 2017-08-25

## 2021-12-14 PROBLEM — R79.89 LOW VITAMIN D LEVEL: Status: ACTIVE | Noted: 2017-08-25

## 2021-12-14 PROBLEM — G47.09 OTHER INSOMNIA: Status: RESOLVED | Noted: 2018-01-29 | Resolved: 2021-12-14

## 2021-12-14 PROCEDURE — S0612 ANNUAL GYNECOLOGICAL EXAMINA: HCPCS | Performed by: PHYSICIAN ASSISTANT

## 2021-12-14 RX ORDER — NORETHINDRONE ACETATE AND ETHINYL ESTRADIOL 1MG-20(21)
1 KIT ORAL DAILY
Qty: 90 TABLET | Refills: 3 | Status: SHIPPED | OUTPATIENT
Start: 2021-12-14 | End: 2021-12-15

## 2022-01-12 DIAGNOSIS — Z30.09 BIRTH CONTROL COUNSELING: ICD-10-CM

## 2022-01-12 RX ORDER — NORETHINDRONE ACETATE AND ETHINYL ESTRADIOL 1MG-20(21)
1 KIT ORAL DAILY
Qty: 90 TABLET | Refills: 3 | Status: SHIPPED | OUTPATIENT
Start: 2022-01-12

## 2022-08-26 DIAGNOSIS — Z30.09 BIRTH CONTROL COUNSELING: ICD-10-CM

## 2022-08-26 RX ORDER — NORETHINDRONE ACETATE AND ETHINYL ESTRADIOL AND FERROUS FUMARATE 1MG-20(21)
1 KIT ORAL DAILY
Qty: 90 TABLET | Refills: 1 | Status: SHIPPED | OUTPATIENT
Start: 2022-08-26

## 2022-08-26 NOTE — TELEPHONE ENCOUNTER
Pt has been getting different generic ocp's wants to stay on brand Junel, can we resend to mail order w/ karen

## 2022-10-12 ENCOUNTER — OFFICE VISIT (OUTPATIENT)
Dept: URGENT CARE | Facility: CLINIC | Age: 27
End: 2022-10-12
Payer: COMMERCIAL

## 2022-10-12 VITALS
OXYGEN SATURATION: 99 % | WEIGHT: 212.8 LBS | TEMPERATURE: 98.7 F | SYSTOLIC BLOOD PRESSURE: 141 MMHG | RESPIRATION RATE: 18 BRPM | BODY MASS INDEX: 36.33 KG/M2 | HEART RATE: 95 BPM | HEIGHT: 64 IN | DIASTOLIC BLOOD PRESSURE: 94 MMHG

## 2022-10-12 DIAGNOSIS — J02.9 SORE THROAT: ICD-10-CM

## 2022-10-12 DIAGNOSIS — J01.10 ACUTE FRONTAL SINUSITIS, RECURRENCE NOT SPECIFIED: Primary | ICD-10-CM

## 2022-10-12 LAB — S PYO AG THROAT QL: NEGATIVE

## 2022-10-12 PROCEDURE — 87880 STREP A ASSAY W/OPTIC: CPT

## 2022-10-12 PROCEDURE — S9083 URGENT CARE CENTER GLOBAL: HCPCS | Performed by: PHYSICIAN ASSISTANT

## 2022-10-12 PROCEDURE — G0382 LEV 3 HOSP TYPE B ED VISIT: HCPCS | Performed by: PHYSICIAN ASSISTANT

## 2022-10-12 RX ORDER — COVID-19 ANTIGEN TEST
KIT MISCELLANEOUS
COMMUNITY
Start: 2022-10-06

## 2022-10-12 RX ORDER — AZITHROMYCIN 250 MG/1
TABLET, FILM COATED ORAL
Qty: 6 TABLET | Refills: 0 | Status: SHIPPED | OUTPATIENT
Start: 2022-10-12 | End: 2022-10-16

## 2022-10-12 NOTE — PROGRESS NOTES
NAME: Tamara Guthrie is a 32 y o  female  : 1995    MRN: [id number]      Assessment and Plan   Acute frontal sinusitis, recurrence not specified [J01 10]  1  Acute frontal sinusitis, recurrence not specified  azithromycin (ZITHROMAX) 250 mg tablet   2  Sore throat  POCT rapid strepA     rapid strep negative- will hold off on culture as it does not appear to be consistent with strep  Treating for sinus infection  Discussed OTC meds, fluids and rest  If no improvement f/u with PCP  She acknowledges  Patient Instructions   Patient Instructions   Azithromycin as directed  OTC meds, fluids and rest  If no improvement f/u with PCP     Proceed to ER if symptoms worsen  Chief Complaint     Chief Complaint   Patient presents with   • Cough     Pt has had on-going cough x5, sore throat x3d, deneis n/v, has diarrhea-home test neg x6d-also has chest congestion         History of Present Illness   Patient with no reported pmhx presents complaining of cough / congestion x 1 5 weeks  States last Monday started with sore throat and lost her voice  Reports a few days later started with congestion and the sore throat resolved  States then the sore throat came back 3 days ago  Denies any fevers or chills  No chest pain or trouble breathing but does report some chest congestion  Reports stuffy runny nose and dry cough  Has been taking NyQuil DayQuil without relief  Home COVID test negative      Review of Systems   Review of Systems   Constitutional: Negative for chills and fever  HENT: Positive for congestion, rhinorrhea and sore throat  Respiratory: Positive for cough  Negative for chest tightness, shortness of breath, wheezing and stridor  Cardiovascular: Negative for chest pain and palpitations  Gastrointestinal: Positive for diarrhea  Negative for abdominal pain, nausea and vomiting  Musculoskeletal: Negative for myalgias  Neurological: Negative for dizziness and weakness  Current Medications       Current Outpatient Medications:   •  azithromycin (ZITHROMAX) 250 mg tablet, Take 2 tablets today then 1 tablet daily x 4 days, Disp: 6 tablet, Rfl: 0  •  Junel FE 1/20 1-20 MG-MCG per tablet, Take 1 tablet by mouth daily, Disp: 90 tablet, Rfl: 1  •  ketoconazole (NIZORAL) 2 % shampoo, Daily for 2 weeks then once a month  Apply on damp skin, leave on for 5mins then rinse , Disp: 120 mL, Rfl: 3  •  QuickVue At-Home Covid-19 Test KIT, FOLLOW MFG INSTRUCTIONS, Disp: , Rfl:     Current Allergies     Allergies as of 10/12/2022 - Reviewed 10/12/2022   Allergen Reaction Noted   • Other Allergic Rhinitis 03/11/2013   • Penicillins Rash 03/11/2013              Past Medical History:   Diagnosis Date   • Migraine    • Strep throat 8/11/2021       Past Surgical History:   Procedure Laterality Date   • FOOT SURGERY     • OTHER SURGICAL HISTORY      L Foot Sx       Family History   Problem Relation Age of Onset   • Diabetes Father    • Thyroid disease Paternal Grandfather    • Cancer Paternal Grandfather    • Leukemia Paternal Grandfather    • Anxiety disorder Mother    • Depression Mother          Medications have been verified  The following portions of the patient's history were reviewed and updated as appropriate: allergies, current medications, past family history, past medical history, past social history, past surgical history and problem list     Objective   /94   Pulse 95   Temp 98 7 °F (37 1 °C)   Resp 18   Ht 5' 4" (1 626 m)   Wt 96 5 kg (212 lb 12 8 oz)   SpO2 99%   BMI 36 53 kg/m²      Physical Exam     Physical Exam  Vitals and nursing note reviewed  Constitutional:       General: She is not in acute distress  Appearance: Normal appearance  She is not ill-appearing, toxic-appearing or diaphoretic  HENT:      Head:      Comments: TMs mildly erythematous bilaterally without injection or bulging  Nasal mucosa erythematous and edematous    Posterior oropharynx clear without erythema, edema, tonsillar hypertrophy or exudates  +purulent PND  Cardiovascular:      Rate and Rhythm: Normal rate and regular rhythm  Heart sounds: Normal heart sounds  Pulmonary:      Effort: Pulmonary effort is normal  No respiratory distress  Breath sounds: Normal breath sounds  No stridor  No wheezing, rhonchi or rales  Musculoskeletal:      Cervical back: Normal range of motion  No rigidity  Lymphadenopathy:      Cervical: No cervical adenopathy  Skin:     Capillary Refill: Capillary refill takes less than 2 seconds  Neurological:      Mental Status: She is alert and oriented to person, place, and time

## 2022-12-16 NOTE — PROGRESS NOTES
Assessment   32 y o  Anton Torres presenting for annual exam      Plan   Diagnoses and all orders for this visit:    Encntr for gyn exam (general) (routine) w/o abn findings  -     Liquid-based pap, screening    Birth control counseling  -     Junel FE 1/20 1-20 MG-MCG per tablet; Take 1 tablet by mouth daily        Pap collected today  Contraception- will continue Junel 1/20; reviewed if mid cycle bleeding spotting persists option of Junel 1 5/30 dose is possible  She will consider this but is happy continuing her current dose at this time  Gardasil- cannot remember if she completed this; advised to call pediatrician for vaccine records  If not completed can return for vaccine nurse visit    SBE encouraged, A yearly mammogram is recommended for breast cancer screening starting at age 36  ASCCP guidelines reviewed  Advised to call with any issues, all concerns & questions addressed  See provided information in your after visit summary     F/U Annually and PRN    Results will be released to [employer], if abnormal will call or message to review and discuss treatment plan      __________________________________________________________________    Subjective     Maribel Valdez Niece is a 32 y o  Anton Torres presenting for annual exam  She is without complaint and does not want STD testing today  Reports some mid cycle light bleeding/sporring on Junel 1/20  Happens early 3rd week of active pills    SCREENING  Last Pap: 12/2020; neg      GYN  The patient's menstrual history is as follows:   Menarche Age: 15 years;  ; Period Cycle (Days): 24; Period Duration (Days): 7; Period Pattern: Regular; Menstrual Flow: Light; Dysmenorrhea: (!) Mild; Dysmenorrhea Symptoms: Cramping    Sexually active: Yes - single partner - male  Contraception: Junel 1/20     Hx Abnormal pap: denies  We reviewed ASCCP guidelines for Pap testing today  Gardasil: She is unsure if she completed the Gardasil series      Denies vaginal discharge, itching, odor, dyspareunia, pelvic pain and vulvar/vaginal symptoms      OB           Complaints: denies   Denies urgency, frequency, hematuria, leakage / change in stream, difficulty urinating  BREAST  Complaints: denies   Denies: breast lump, breast tenderness, nipple discharge, skin color change, and skin lesion(s)      Pertinent Family Hx:   Family hx of breast cancer: no  Family hx of ovarian cancer: no  Family hx of colon cancer: no      GENERAL  PMH reviewed/updated and is as below  Patient does follow with a PCP  SOCIAL  Smoking: no  Alcohol:social  Drug: no  Occupation:        Past Medical History:   Diagnosis Date   • Migraine    • Strep throat 2021       Past Surgical History:   Procedure Laterality Date   • FOOT SURGERY     • OTHER SURGICAL HISTORY      L Foot Sx         Current Outpatient Medications:   •  Junel FE 1/20 1-20 MG-MCG per tablet, Take 1 tablet by mouth daily, Disp: 90 tablet, Rfl: 1  •  ketoconazole (NIZORAL) 2 % shampoo, Daily for 2 weeks then once a month   Apply on damp skin, leave on for 5mins then rinse , Disp: 120 mL, Rfl: 3  •  QuickVue At-Home Covid-19 Test KIT, FOLLOW MFG INSTRUCTIONS, Disp: , Rfl:     Allergies   Allergen Reactions   • Other Allergic Rhinitis     Hay Fever   • Penicillins Rash       Social History     Socioeconomic History   • Marital status: Single     Spouse name: Not on file   • Number of children: Not on file   • Years of education: Not on file   • Highest education level: Not on file   Occupational History   • Not on file   Tobacco Use   • Smoking status: Never   • Smokeless tobacco: Never   Vaping Use   • Vaping Use: Never used   Substance and Sexual Activity   • Alcohol use: Yes     Comment: socially   • Drug use: No   • Sexual activity: Never   Other Topics Concern   • Not on file   Social History Narrative    Caffeine use     Social Determinants of Health     Financial Resource Strain: Not on file   Food Insecurity: Not on file   Transportation Needs: Not on file   Physical Activity: Not on file   Stress: Not on file   Social Connections: Not on file   Intimate Partner Violence: Not on file   Housing Stability: Not on file       Review of Systems     ROS:  Constitutional: Negative for fatigue and unexpected weight change  Respiratory: Negative for cough and shortness of breath  Cardiovascular: Negative for chest pain and palpitations  Gastrointestinal: Negative for abdominal pain and change in bowel habits  Breasts:  Negative, other than as noted above  Genitourinary: Negative, other than as noted above  Psychiatric: Negative for mood difficulties  Objective         Vitals:    12/20/22 1555   BP: 120/78       Physical Examination:    Patient appears well and is not in distress  Neck is supple without masses, no cervical or supraclavicular lymphadenopathy  Cardiovascular: regular rate and rhythm; no murmurs  Lungs: clear to auscultation bilaterally; no wheezes  Breasts are symmetrical without mass, tenderness, nipple discharge, skin changes or adenopathy  Abdomen is soft and nontender without masses  External genitals are normal without lesions or rashes  Urethral meatus and urethra are normal  Bladder is normal to palpation  Vagina is normal without discharge or bleeding  Cervix is normal without discharge or lesion  Uterus is normal, mobile, nontender without palpable mass  Adnexa are normal, nontender, without palpable mass

## 2022-12-20 ENCOUNTER — ANNUAL EXAM (OUTPATIENT)
Dept: OBGYN CLINIC | Facility: CLINIC | Age: 27
End: 2022-12-20

## 2022-12-20 VITALS
DIASTOLIC BLOOD PRESSURE: 78 MMHG | SYSTOLIC BLOOD PRESSURE: 120 MMHG | WEIGHT: 213.4 LBS | BODY MASS INDEX: 36.43 KG/M2 | HEIGHT: 64 IN

## 2022-12-20 DIAGNOSIS — Z01.419 ENCNTR FOR GYN EXAM (GENERAL) (ROUTINE) W/O ABN FINDINGS: ICD-10-CM

## 2022-12-20 DIAGNOSIS — Z30.09 BIRTH CONTROL COUNSELING: ICD-10-CM

## 2022-12-20 RX ORDER — NORETHINDRONE ACETATE AND ETHINYL ESTRADIOL AND FERROUS FUMARATE 1MG-20(21)
1 KIT ORAL DAILY
Qty: 84 TABLET | Refills: 3 | Status: SHIPPED | OUTPATIENT
Start: 2022-12-20

## 2022-12-27 LAB
LAB AP GYN PRIMARY INTERPRETATION: NORMAL
Lab: NORMAL

## 2023-05-17 ENCOUNTER — AMB VIDEO VISIT (OUTPATIENT)
Dept: OTHER | Facility: HOSPITAL | Age: 28
End: 2023-05-17

## 2023-05-17 DIAGNOSIS — J40 BRONCHITIS: Primary | ICD-10-CM

## 2023-05-17 RX ORDER — AZITHROMYCIN 250 MG/1
TABLET, FILM COATED ORAL
Qty: 6 TABLET | Refills: 0 | Status: SHIPPED | OUTPATIENT
Start: 2023-05-17 | End: 2023-05-22

## 2023-05-17 RX ORDER — BENZONATATE 100 MG/1
100 CAPSULE ORAL 3 TIMES DAILY PRN
Qty: 20 CAPSULE | Refills: 0 | Status: SHIPPED | OUTPATIENT
Start: 2023-05-17

## 2023-05-17 NOTE — CARE ANYWHERE EVISITS
Visit Summary for MARION Roberson - Gender: Female - Date of Birth: [id number]  Date: [id number] - Duration: 4 minutes  Patient: Ghanshyamwillian Ottojulius  Provider: Hedy Miranda PA-C    Patient Contact Information  Address  [address] [place] [address] [place] [address] [place] [address]  [phone]    Visit Topics  Coughing, congestion [Added By: Self - 2023-05-17]    Triage Questions   What is your current physical address in the event of a medical emergency? Answer []  Are you allergic to any medications? Answer []  Are you now or could you be pregnant? Answer []  Do you have any immune system compromise or chronic lung   disease? Answer []  Do you have any vulnerable family members in the home (infant, pregnant, cancer, elderly)? Answer []     Conversation Transcripts  [0A][0A] [Notification] You are connected with Hedy Miranda PA-C, Urgent Care Specialist [0A][Notification] MARION Roberson is located in [place]  [0A][Notification] MARION Roberson has shared health history  Nnamdi Nose  [0A]    Diagnosis  Bronchitis, not specified as acute or chronic    Procedures  Value: [id number] Code: CPT-4 UNLISTED E&M SERVICE    Medications Prescribed    No prescriptions ordered    Electronically signed by: Darline Arana(NPI [id number])

## 2023-05-17 NOTE — PATIENT INSTRUCTIONS
Acute Bronchitis   WHAT YOU NEED TO KNOW:   Acute bronchitis is swelling and irritation in your lungs  It is usually caused by a virus and most often happens in the winter  Bronchitis may also be caused by bacteria or by a chemical irritant, such as smoke  DISCHARGE INSTRUCTIONS:   Return to the emergency department if:   You cough up blood  Your lips or fingernails turn blue  You feel like you are not getting enough air when you breathe  Call your doctor if:   Your symptoms do not go away or get worse, even after treatment  Your cough does not get better within 4 weeks  You have questions or concerns about your condition or care  Medicines: You may  need any of the following:  Cough suppressants  decrease your urge to cough  Decongestants  help loosen mucus in your lungs and make it easier to cough up  This can help you breathe easier  Inhalers  may be given  Your healthcare provider may give you one or more inhalers to help you breathe easier and cough less  An inhaler gives your medicine to open your airways  Ask your healthcare provider to show you how to use your inhaler correctly  Antibiotics  may be given for up to 5 days if your bronchitis is caused by bacteria  Acetaminophen  decreases pain and fever  It is available without a doctor's order  Ask how much to take and how often to take it  Follow directions  Read the labels of all other medicines you are using to see if they also contain acetaminophen, or ask your doctor or pharmacist  Acetaminophen can cause liver damage if not taken correctly  NSAIDs  help decrease swelling and pain or fever  This medicine is available with or without a doctor's order  NSAIDs can cause stomach bleeding or kidney problems in certain people  If you take blood thinner medicine, always ask your healthcare provider if NSAIDs are safe for you  Always read the medicine label and follow directions  Take your medicine as directed  Contact your healthcare provider if you think your medicine is not helping or if you have side effects  Tell your provider if you are allergic to any medicine  Keep a list of the medicines, vitamins, and herbs you take  Include the amounts, and when and why you take them  Bring the list or the pill bottles to follow-up visits  Carry your medicine list with you in case of an emergency  Self-care:   Drink liquids as directed  You may need to drink more liquids than usual to stay hydrated  Ask how much liquid to drink each day and which liquids are best for you  Use a cool mist humidifier  to increase air moisture in your home  This may make it easier for you to breathe and help decrease your cough  Get more rest   Rest helps your body to heal  Slowly start to do more each day  Rest when you feel it is needed  Avoid irritants in the air  Avoid chemicals, fumes, and dust  Wear a face mask if you must work around dust or fumes  Stay inside on days when air pollution levels are high  If you have allergies, stay inside when pollen counts are high  Do not use aerosol products, such as spray-on deodorant, bug spray, and hair spray  Do not smoke or be around others who are smoking  Nicotine and other chemicals in cigarettes and cigars can cause lung damage  Ask your healthcare provider for information if you currently smoke and need help to quit  E-cigarettes or smokeless tobacco still contain nicotine  Talk to your healthcare provider before you use these products  Prevent acute bronchitis:       Ask about vaccines you may need  Get a flu vaccine each year as soon as recommended, usually in September or October  Ask your healthcare provider if you should also get a pneumonia or COVID-19 vaccine  Your healthcare provider can tell you if you should also get other vaccines, and when to get them  Prevent the spread of germs    You can decrease your risk for acute bronchitis and other illnesses by doing the following:     Wash your hands often with soap and water  Carry germ-killing hand lotion or gel with you  You can use the lotion or gel to clean your hands when soap and water are not available  Do not touch your eyes, nose, or mouth unless you have washed your hands first     Always cover your mouth when you cough to prevent the spread of germs  It is best to cough into a tissue or your shirt sleeve instead of into your hand  Ask those around you to cover their mouths when they cough  Try to avoid people who have a cold or the flu  If you are sick, stay away from others as much as possible  Follow up with your doctor as directed:  Write down questions you have so you will remember to ask them during your follow-up visits  © Copyright Fely Velasquez 2022 Information is for End User's use only and may not be sold, redistributed or otherwise used for commercial purposes  The above information is an  only  It is not intended as medical advice for individual conditions or treatments  Talk to your doctor, nurse or pharmacist before following any medical regimen to see if it is safe and effective for you

## 2023-05-17 NOTE — PROGRESS NOTES
Video Visit - Luisa Johnson 29 y o  female MRN: [id number]    REQUIRED DOCUMENTATION:         1  [facility]  2  Provider located at [address] [place] [id number] [phone]  3  [facility] provider: Rosy Rabago PA-C   4  Identify all parties in room with patient during [facility] visit:  No one else  5  After connecting through [employer], patient was identified by name and date of birth  Patient was then informed that this was a Telemedicine visit and that the exam was being conducted confidentially over secure lines  My office door was closed  No one else was in the room  Patient acknowledged consent and understanding of privacy and security of the Telemedicine visit  I informed the patient that I have reviewed their record in Epic and presented the opportunity for them to ask any questions regarding the visit today  The patient agreed to participate  HPI  Patient states that she is having cough for 2 weeks  It started with a sore throat and lost her voice  Her voice came back  She is still having persistent congestion and is coughing up green jad mucus  The chest congestion is bothering her  She can hear the congestion when breathing  She has tried allergy relief and otc meds without relief  She had two negative covid test      Review of Systems   Constitutional: Negative  HENT: Positive for congestion  Respiratory: Positive for cough  Negative for shortness of breath and wheezing  Gastrointestinal: Negative  Musculoskeletal: Negative  Neurological: Negative  Psychiatric/Behavioral: Negative  Physical Exam  Constitutional:       General: She is not in acute distress  Appearance: Normal appearance  She is not ill-appearing, toxic-appearing or diaphoretic  HENT:      Head: Normocephalic and atraumatic  Nose: Congestion present     Pulmonary:      Effort: Pulmonary effort is normal  No respiratory distress  Comments: talking in complete sentences, no audible wheezing  Skin:     General: Skin is dry  Neurological:      General: No focal deficit present  Mental Status: She is alert and oriented to person, place, and time  Psychiatric:         Mood and Affect: Mood normal          Behavior: Behavior normal          Diagnoses and all orders for this visit:    Bronchitis  -     azithromycin (ZITHROMAX) 250 mg tablet; Take 2 tablets today then 1 tablet daily x 4 days  -     benzonatate (TESSALON PERLES) 100 mg capsule; Take 1 capsule (100 mg total) by mouth 3 (three) times a day as needed for cough      Patient Instructions     Acute Bronchitis   WHAT YOU NEED TO KNOW:   Acute bronchitis is swelling and irritation in your lungs  It is usually caused by a virus and most often happens in the winter  Bronchitis may also be caused by bacteria or by a chemical irritant, such as smoke  DISCHARGE INSTRUCTIONS:   Return to the emergency department if:   • You cough up blood  • Your lips or fingernails turn blue  • You feel like you are not getting enough air when you breathe  Call your doctor if:   • Your symptoms do not go away or get worse, even after treatment  • Your cough does not get better within 4 weeks  • You have questions or concerns about your condition or care  Medicines: You may  need any of the following:  • Cough suppressants  decrease your urge to cough  • Decongestants  help loosen mucus in your lungs and make it easier to cough up  This can help you breathe easier  • Inhalers  may be given  Your healthcare provider may give you one or more inhalers to help you breathe easier and cough less  An inhaler gives your medicine to open your airways  Ask your healthcare provider to show you how to use your inhaler correctly  • Antibiotics  may be given for up to 5 days if your bronchitis is caused by bacteria      • Acetaminophen  decreases pain and fever  It is available without a doctor's order  Ask how much to take and how often to take it  Follow directions  Read the labels of all other medicines you are using to see if they also contain acetaminophen, or ask your doctor or pharmacist  Acetaminophen can cause liver damage if not taken correctly  • NSAIDs  help decrease swelling and pain or fever  This medicine is available with or without a doctor's order  NSAIDs can cause stomach bleeding or kidney problems in certain people  If you take blood thinner medicine, always ask your healthcare provider if NSAIDs are safe for you  Always read the medicine label and follow directions  • Take your medicine as directed  Contact your healthcare provider if you think your medicine is not helping or if you have side effects  Tell your provider if you are allergic to any medicine  Keep a list of the medicines, vitamins, and herbs you take  Include the amounts, and when and why you take them  Bring the list or the pill bottles to follow-up visits  Carry your medicine list with you in case of an emergency  Self-care:   • Drink liquids as directed  You may need to drink more liquids than usual to stay hydrated  Ask how much liquid to drink each day and which liquids are best for you  • Use a cool mist humidifier  to increase air moisture in your home  This may make it easier for you to breathe and help decrease your cough  • Get more rest   Rest helps your body to heal  Slowly start to do more each day  Rest when you feel it is needed  • Avoid irritants in the air  Avoid chemicals, fumes, and dust  Wear a face mask if you must work around dust or fumes  Stay inside on days when air pollution levels are high  If you have allergies, stay inside when pollen counts are high  Do not use aerosol products, such as spray-on deodorant, bug spray, and hair spray  • Do not smoke or be around others who are smoking    Nicotine and other chemicals in cigarettes and cigars can cause lung damage  Ask your healthcare provider for information if you currently smoke and need help to quit  E-cigarettes or smokeless tobacco still contain nicotine  Talk to your healthcare provider before you use these products  Prevent acute bronchitis:       • Ask about vaccines you may need  Get a flu vaccine each year as soon as recommended, usually in September or October  Ask your healthcare provider if you should also get a pneumonia or COVID-19 vaccine  Your healthcare provider can tell you if you should also get other vaccines, and when to get them  • Prevent the spread of germs  You can decrease your risk for acute bronchitis and other illnesses by doing the following:     ? Wash your hands often with soap and water  Carry germ-killing hand lotion or gel with you  You can use the lotion or gel to clean your hands when soap and water are not available  ? Do not touch your eyes, nose, or mouth unless you have washed your hands first     ? Always cover your mouth when you cough to prevent the spread of germs  It is best to cough into a tissue or your shirt sleeve instead of into your hand  Ask those around you to cover their mouths when they cough  ? Try to avoid people who have a cold or the flu  If you are sick, stay away from others as much as possible  Follow up with your doctor as directed:  Write down questions you have so you will remember to ask them during your follow-up visits  © Copyright Veronica Maxwell 2022 Information is for End User's use only and may not be sold, redistributed or otherwise used for commercial purposes  The above information is an  only  It is not intended as medical advice for individual conditions or treatments  Talk to your doctor, nurse or pharmacist before following any medical regimen to see if it is safe and effective for you

## 2023-10-16 ENCOUNTER — AMB VIDEO VISIT (OUTPATIENT)
Dept: OTHER | Facility: HOSPITAL | Age: 28
End: 2023-10-16

## 2023-10-16 VITALS — TEMPERATURE: 98.9 F

## 2023-10-16 PROCEDURE — ECARE PR SL URGENT CARE VIRTUAL VISIT: Performed by: NURSE PRACTITIONER

## 2023-10-16 NOTE — PROGRESS NOTES
Video Visit - Magalys Vaz 29 y.o. female MRN: [id number]    REQUIRED DOCUMENTATION:         1. This service was provided via [employer]. 2. Provider located at [address] [place] [address] [address]  [phone].  3. [facility] provider: Meaghan Peña, [address] [facility]. 4. Identify all parties in room with patient during [facility] visit:  Patient   5. After connecting through televideo, patient was identified by name and date of birth. Patient was then informed that this was a Telemedicine visit and that the exam was being conducted confidentially over secure lines. My office door was closed. No one else was in the room. Patient acknowledged consent and understanding of privacy and security of the Telemedicine visit. I informed the patient that I have reviewed their record in Epic and presented the opportunity for them to ask any questions regarding the visit today. The patient agreed to participate. This is a 29year old male here today for video visit. She started 3 days ago with sore throat. She state sore throat is improving. She has a cough. She states she has been sick on and off since the start of school. She is a teacher. She state this is the third time she has been sick in the last several months. She did home covid test which was negative. Tmax 99.5. She is drinking water but has poor appetite and upset stomach. She has had some diarrhea. Review of Systems   Constitutional:  Positive for activity change, chills and fatigue. Negative for fever. HENT:  Positive for congestion and rhinorrhea. Negative for sinus pressure and sinus pain. Respiratory:  Positive for cough. Cardiovascular: Negative. Neurological: Negative. Psychiatric/Behavioral: Negative. Vitals:    10/16/23 1134   Temp: 98.9 °F (37.2 °C)     Physical Exam  Constitutional:       General: She is not in acute distress. Appearance: Normal appearance.  She is not ill-appearing or toxic-appearing. HENT:      Head: Normocephalic and atraumatic. Mouth/Throat:      Pharynx: No posterior oropharyngeal erythema. Eyes:      Conjunctiva/sclera: Conjunctivae normal.   Pulmonary:      Effort: Pulmonary effort is normal. No respiratory distress. Comments: No cough or audible wheezing, able to speak in full sentences. Skin:     Comments: No rash on head or neck. Neurological:      Mental Status: She is alert and oriented to person, place, and time. Psychiatric:         Mood and Affect: Mood normal.         Behavior: Behavior normal.         Thought Content: Thought content normal.         Judgment: Judgment normal.       There are no diagnoses linked to this encounter. Patient Instructions   As we discussed your illness is viral.  No antibiotics are indicated at this time. Rest and drink extra fluids. OTC cough and cold medications as needed. Mucinex Max may work well. Tylenol or Motrin as needed for pain or fever. Salt water gargles and throat lozenges for sore throat. Follow up with PCP if no improvement. Go to ER with worsening symptoms. Cold Symptoms   WHAT YOU NEED TO KNOW:   A cold is an infection caused by a virus. The infection causes your upper respiratory system to become inflamed. Common symptoms of a cold include sneezing, dry throat, a stuffy nose, headache, watery eyes, and a cough. Your cough may be dry, or you may cough up mucus. You may also have muscle aches, joint pain, and tiredness. Rarely, you may have a fever. Most colds go away without treatment. DISCHARGE INSTRUCTIONS:   Return to the emergency department if:   You have increased tiredness and weakness. You are unable to eat. Your heart is beating much faster than usual for you. You see white spots in the back of your throat and your neck is swollen and sore to the touch. You see pinpoint or larger reddish-purple dots on your skin.   Contact your healthcare provider if:   You have a fever higher than 102°F (38.9°C). You have new or worsening shortness of breath. You have thick nasal drainage for more than 2 days. Your symptoms do not improve or get worse within 5 days. You have questions or concerns about your condition or care. Medicines: The following medicines may be suggested by your healthcare provider to decrease your cold symptoms. These medicines are available without a doctor's order. Ask which medicines to take and when to take them. Follow directions. NSAIDs or acetaminophen  help to bring down a fever or decrease pain. Decongestants  help decrease nasal stuffiness. Antihistamines  help decrease sneezing and a runny nose. Cough suppressants  help decrease how much you cough. Expectorants  help loosen mucus so you can cough it up. Take your medicine as directed. Contact your healthcare provider if you think your medicine is not helping or if you have side effects. Tell him of her if you are allergic to any medicine. Keep a list of the medicines, vitamins, and herbs you take. Include the amounts, and when and why you take them. Bring the list or the pill bottles to follow-up visits. Carry your medicine list with you in case of an emergency. Symptom relief: The following may help relieve cold symptoms, such as a dry throat and congestion:  Gargle with mouthwash or warm salt water as directed. Suck on throat lozenges or hard candy. Use a cold or warm vaporizer or humidifier to ease your breathing. Rest for at least 2 days and then as needed to decrease tiredness and weakness. Use petroleum based jelly around your nostrils to decrease irritation from blowing your nose. Drink liquids:  Liquids will help thin and loosen thick mucus so you can cough it up. Liquids will also keep you hydrated. Ask your healthcare provider which liquids are best for you and how much to drink each day.   Prevent the spread of germs: You can spread your cold germs to others for at least 3 days after your symptoms start. Wash your hands often. Do not share items, such as eating utensils. Cover your nose and mouth when you cough or sneeze using the crook of your elbow instead of your hands. Throw used tissues in the garbage. Do not smoke:  Smoking may worsen your symptoms and increase the length of time you feel sick. Talk with your healthcare provider if you need help to stop smoking. Follow up with your healthcare provider as directed:  Write down your questions so you remember to ask them during your visits. © 2017 [address] [place] [address] [place] Information is for End User's use only and may not be sold, redistributed or otherwise used for commercial purposes. All illustrations and images included in CareNotes® are the copyrighted property of A.D.A.M., Inc. or Ernesto Mcwilliams. The above information is an  only. It is not intended as medical advice for individual conditions or treatments. Talk to your doctor, nurse or pharmacist before following any medical regimen to see if it is safe and effective for you. Follow up with PCP if not improved, if symptoms are worse, go to the ER.

## 2023-10-16 NOTE — CARE ANYWHERE EVISITS
Visit Summary for Yainra Barron  - Gender: Female - Date of Birth: [id number]  Date: [id number] - Duration: 7 minutes  Patient: Yanira Cook  Provider: [address]    Patient Contact Information  Address  [address] [place] [address] Road; [address] [place] [address]  [phone]    Visit Topics  Fever [Added By: Self - 2023-10-16]  Stomachache [Added By: Self - 2023-10-16]  Cold [Added By: Self - 2023-10-16]  Headache [Added By: Self - 2023-10-16]    Triage Questions   What is your current physical address in the event of a medical emergency? Answer []  Are you allergic to any medications? Answer []  Are you now or could you be pregnant? Answer []  Do you have any immune system compromise or chronic lung   disease? Answer []  Do you have any vulnerable family members in the home (infant, pregnant, cancer, elderly)? Answer []     Conversation Transcripts  [0A][0A] [Notification] You are connected with [facility].[0A][Notification] MARION Zayas is located in [place]. [0A][Notification] MARION Zayas has shared health history. [facility] .[0A]    Diagnosis  Acute upper respiratory infection, unspecified    Procedures  Value: [id number] Code: CPT-4 UNLISTED E&M SERVICE    Medications Prescribed    No prescriptions ordered    Electronically signed by: Fang Martinez(NPI [id number])

## 2023-10-16 NOTE — PATIENT INSTRUCTIONS
As we discussed your illness is viral.  No antibiotics are indicated at this time. Rest and drink extra fluids. OTC cough and cold medications as needed. Mucinex Max may work well. Tylenol or Motrin as needed for pain or fever. Salt water gargles and throat lozenges for sore throat. Follow up with PCP if no improvement. Go to ER with worsening symptoms. Cold Symptoms   WHAT YOU NEED TO KNOW:   A cold is an infection caused by a virus. The infection causes your upper respiratory system to become inflamed. Common symptoms of a cold include sneezing, dry throat, a stuffy nose, headache, watery eyes, and a cough. Your cough may be dry, or you may cough up mucus. You may also have muscle aches, joint pain, and tiredness. Rarely, you may have a fever. Most colds go away without treatment. DISCHARGE INSTRUCTIONS:   Return to the emergency department if:   You have increased tiredness and weakness. You are unable to eat. Your heart is beating much faster than usual for you. You see white spots in the back of your throat and your neck is swollen and sore to the touch. You see pinpoint or larger reddish-purple dots on your skin. Contact your healthcare provider if:   You have a fever higher than 102°F (38.9°C). You have new or worsening shortness of breath. You have thick nasal drainage for more than 2 days. Your symptoms do not improve or get worse within 5 days. You have questions or concerns about your condition or care. Medicines: The following medicines may be suggested by your healthcare provider to decrease your cold symptoms. These medicines are available without a doctor's order. Ask which medicines to take and when to take them. Follow directions. NSAIDs or acetaminophen  help to bring down a fever or decrease pain. Decongestants  help decrease nasal stuffiness. Antihistamines  help decrease sneezing and a runny nose.      Cough suppressants  help decrease how much you cough. Expectorants  help loosen mucus so you can cough it up. Take your medicine as directed. Contact your healthcare provider if you think your medicine is not helping or if you have side effects. Tell him of her if you are allergic to any medicine. Keep a list of the medicines, vitamins, and herbs you take. Include the amounts, and when and why you take them. Bring the list or the pill bottles to follow-up visits. Carry your medicine list with you in case of an emergency. Symptom relief: The following may help relieve cold symptoms, such as a dry throat and congestion:  Gargle with mouthwash or warm salt water as directed. Suck on throat lozenges or hard candy. Use a cold or warm vaporizer or humidifier to ease your breathing. Rest for at least 2 days and then as needed to decrease tiredness and weakness. Use petroleum based jelly around your nostrils to decrease irritation from blowing your nose. Drink liquids:  Liquids will help thin and loosen thick mucus so you can cough it up. Liquids will also keep you hydrated. Ask your healthcare provider which liquids are best for you and how much to drink each day. Prevent the spread of germs: You can spread your cold germs to others for at least 3 days after your symptoms start. Wash your hands often. Do not share items, such as eating utensils. Cover your nose and mouth when you cough or sneeze using the crook of your elbow instead of your hands. Throw used tissues in the garbage. Do not smoke:  Smoking may worsen your symptoms and increase the length of time you feel sick. Talk with your healthcare provider if you need help to stop smoking. Follow up with your healthcare provider as directed:  Write down your questions so you remember to ask them during your visits. © 2017 [address] [place] [address] [place] Information is for End User's use only and may not be sold, redistributed or otherwise used for commercial purposes.  All illustrations and images included in CareNotes® are the copyrighted property of [employer]. or Ernesto Mcwilliams. The above information is an  only. It is not intended as medical advice for individual conditions or treatments. Talk to your doctor, nurse or pharmacist before following any medical regimen to see if it is safe and effective for you. done

## 2023-11-10 ENCOUNTER — TELEMEDICINE (OUTPATIENT)
Dept: INTERNAL MEDICINE CLINIC | Facility: CLINIC | Age: 28
End: 2023-11-10
Payer: COMMERCIAL

## 2023-11-10 VITALS — BODY MASS INDEX: 35.85 KG/M2 | WEIGHT: 210 LBS | TEMPERATURE: 99 F | HEIGHT: 64 IN

## 2023-11-10 DIAGNOSIS — J06.9 UPPER RESPIRATORY TRACT INFECTION, UNSPECIFIED TYPE: Primary | ICD-10-CM

## 2023-11-10 PROCEDURE — 99213 OFFICE O/P EST LOW 20 MIN: CPT | Performed by: INTERNAL MEDICINE

## 2023-11-10 RX ORDER — AZITHROMYCIN 250 MG/1
TABLET, FILM COATED ORAL
Qty: 6 TABLET | Refills: 0 | Status: SHIPPED | OUTPATIENT
Start: 2023-11-10 | End: 2023-11-14

## 2023-11-10 NOTE — PROGRESS NOTES
Virtual Regular Visit    Verification of patient location:    Patient is located at Other in the following state in which I hold an active license [place]      Assessment/Plan:    Problem List Items Addressed This Visit    None  Visit Diagnoses     Upper respiratory tract infection, unspecified type    -  Primary    Relevant Medications    azithromycin (ZITHROMAX) 250 mg tablet        Continue symptom directed treatment  Start Zpack  Call if not improving    Depression Screening and Follow-up Plan: Patient was screened for depression during today's encounter. They screened negative with a PHQ-2 score of 0. Reason for visit is   Chief Complaint   Patient presents with   • Cold Like Symptoms     Going on for a couple of weeks, coughing up green phlegm, nasal congestion. Has covid tested and was negative. Low grade fevers   • Virtual Regular Visit          Encounter provider Ivanna Olson MD    Provider located at [address]      Recent Visits  No visits were found meeting these conditions. Showing recent visits within past 7 days and meeting all other requirements  Today's Visits  Date Type Provider Dept   11/10/23 Telemedicine Ivanna Olson MD [address] today's visits and meeting all other requirements  Future Appointments  No visits were found meeting these conditions. Showing future appointments within next 150 days and meeting all other requirements       The patient was identified by name and date of birth. Sagrario Mistry was informed that this is a telemedicine visit and that the visit is being conducted through the [address] [place] [address] [employer] platform. She agrees to proceed. .  My office door was closed. No one else was in the room. She acknowledged consent and understanding of privacy and security of the video platform.  The patient has agreed to participate and understands they can discontinue the visit at any time.    Patient is aware this is a billable service. Subjective  Maribel Delgado is a 29 y.o. female with a cough . 2 months of on and off cough  For 1 1/2 weeks she has had a productive cough with green phlegm, nasal congestion, low grade fever  Denies SOB but chest huts with coughing  She is taking OTC cough meds         Past Medical History:   Diagnosis Date   • Migraine    • Strep throat 8/11/2021       Past Surgical History:   Procedure Laterality Date   • FOOT SURGERY     • OTHER SURGICAL HISTORY      L Foot Sx       Current Outpatient Medications   Medication Sig Dispense Refill   • azithromycin (ZITHROMAX) 250 mg tablet 2 tabs PO today then 1 daily for 4 days 6 tablet 0   • ketoconazole (NIZORAL) 2 % shampoo Daily for 2 weeks then once a month. Apply on damp skin, leave on for 5mins then rinse. 120 mL 3     No current facility-administered medications for this visit. Allergies   Allergen Reactions   • Other Allergic Rhinitis     Hay Fever   • Penicillins Rash       Review of Systems   Constitutional:  Negative for fever. HENT:  Positive for sore throat. Respiratory:  Positive for cough. Negative for chest tightness, shortness of breath and wheezing. Cardiovascular:  Positive for chest pain. Gastrointestinal:  Negative for diarrhea and vomiting. Musculoskeletal:  Negative for myalgias. Neurological:  Negative for headaches. Video Exam    Vitals:    11/10/23 1114   Temp: 99 °F (37.2 °C)   TempSrc: Tympanic   Weight: 95.3 kg (210 lb)   Height: 5' 4" (1.626 m)       Physical Exam  Constitutional:       Appearance: She is obese. She is not ill-appearing. Pulmonary:      Effort: No respiratory distress.    Psychiatric:         Mood and Affect: Mood normal.         Behavior: Behavior normal.          Visit Time  Total Visit Duration: 15mins

## 2023-12-21 DIAGNOSIS — Z30.09 BIRTH CONTROL COUNSELING: ICD-10-CM

## 2023-12-21 RX ORDER — NORETHINDRONE ACETATE AND ETHINYL ESTRADIOL AND FERROUS FUMARATE 1MG-20(21)
1 KIT ORAL DAILY
Qty: 84 TABLET | Refills: 3 | OUTPATIENT
Start: 2023-12-21

## 2023-12-21 NOTE — TELEPHONE ENCOUNTER
----- Message from Maribel Moerlos sent at 12/21/2023  4:06 PM EST -----  Regarding: refill  Contact: [phone]  I did stop.

## 2024-01-03 ENCOUNTER — ANNUAL EXAM (OUTPATIENT)
Dept: OBGYN CLINIC | Facility: CLINIC | Age: 29
End: 2024-01-03
Payer: COMMERCIAL

## 2024-01-03 VITALS
DIASTOLIC BLOOD PRESSURE: 74 MMHG | HEIGHT: 64 IN | WEIGHT: 212 LBS | SYSTOLIC BLOOD PRESSURE: 110 MMHG | BODY MASS INDEX: 36.19 KG/M2

## 2024-01-03 DIAGNOSIS — N92.6 IRREGULAR MENSES: ICD-10-CM

## 2024-01-03 DIAGNOSIS — Z01.419 WELL WOMAN EXAM WITH ROUTINE GYNECOLOGICAL EXAM: Primary | ICD-10-CM

## 2024-01-03 DIAGNOSIS — Z78.9 ATTEMPTING TO CONCEIVE: ICD-10-CM

## 2024-01-03 PROCEDURE — S0612 ANNUAL GYNECOLOGICAL EXAMINA: HCPCS | Performed by: PHYSICIAN ASSISTANT

## 2024-01-03 NOTE — PROGRESS NOTES
Patient presents for a routine annual visit  Last Pap Smear- 2022 neg  HPV vaccine- not completed   LMP-23 - irregular   Birth control- condoms     nonsmoker  Currently sexually active - one partner   STD testing - declines   No family history of uterine, ovarian, cervical or breast cancer  No concerns/questions for today's visit

## 2024-01-03 NOTE — PROGRESS NOTES
Assessment   28 y.o.  presenting for annual exam.     Plan   Diagnoses and all orders for this visit:    Well woman exam with routine gynecological exam    Irregular menses  -     TSH, 3rd generation with Free T4 reflex; Future  -     Prolactin; Future  -     Insulin, fasting; Future  -     Glucose, fasting; Future  -     Testosterone; Future  -     Luteinizing hormone; Future  -     Follicle stimulating hormone; Future  -     Estradiol; Future  -     DHEA-sulfate; Future  -     17-Hydroxyprogesterone; Future  -     Antimullerian hormone (AMH); Future  -     US pelvis complete w transvaginal; Future    Attempting to conceive  -     TSH, 3rd generation with Free T4 reflex; Future  -     Prolactin; Future  -     Insulin, fasting; Future  -     Glucose, fasting; Future  -     Testosterone; Future  -     Luteinizing hormone; Future  -     Follicle stimulating hormone; Future  -     Estradiol; Future  -     DHEA-sulfate; Future  -     17-Hydroxyprogesterone; Future  -     Antimullerian hormone (AMH); Future  -     US pelvis complete w transvaginal; Future        Pap due with annual next year  Irregular menses- will start attempting to conceive in the summer. Longstanding hx of irregular, oligomenorrheic cycles. Will obtain PCOS/infertility labs and TVUS. Plan to follow up based on US/labs    Perineal hygiene reviewed. Weight bearing exercises minium of 150 mins/weekly advised. Kegel exercises recommended.   SBE encouraged, A yearly mammogram is recommended for breast cancer screening starting at age 40. ASCCP guidelines reviewed. Condoms encouraged with all sexual activity to prevent STI's. Gardisil vaccines recommended up to age 45. Calcium/ Vit D dietary requirements discussed.   Advised to call with any issues, all concerns & questions addressed.   See provided information in your after visit summary     F/U Annually and PRN    Results will be released to [employer], if abnormal will call or message to review and  discuss treatment plan.     __________________________________________________________________    Subjective     Maribel Asia Morelos is a 28 y.o.  presenting for annual exam.     She got  in [facility] Stopped OCPs about 3-4 months ago. Has not been consistently tracking cycles since stopping OCPs but will start doing so. She reports menses roughly every month or so since stopping. Reports longstanding hx of oligomenorrheic cycles. At one time was advised to cycle with provera but ultimately started OCPs. She never had labs or a work up for this.    SCREENING  Last Pap: 2022 negative      GYN  Sexually active: Yes - single partner - male  Contraception: condoms    Hx Abnormal pap: denies  We reviewed ASCCP guidelines for Pap testing today.  Gardasil: She is not sure if she completed the Gardasil series. She will check.     Denies vaginal discharge, itching, odor, dyspareunia, pelvic pain and vulvar/vaginal symptoms      OB           Complaints: denies   Denies urgency, frequency, hematuria, leakage / change in stream, difficulty urinating.       BREAST  Complaints: denies   Denies: breast lump, breast tenderness, nipple discharge, skin color change, and skin lesion(s)      Pertinent Family Hx:   Family hx of breast cancer: no  Family hx of ovarian cancer: no  Family hx of colon cancer: no      GENERAL  PMH reviewed/updated and is as below.   Patient does follow with a PCP.    SOCIAL  Smoking: no  Alcohol:social  Drug: no  Occupation: - [employer]      Past Medical History:   Diagnosis Date    Migraine     Strep throat 2021       Past Surgical History:   Procedure Laterality Date    FOOT SURGERY      OTHER SURGICAL HISTORY      L Foot Sx         Current Outpatient Medications:     ketoconazole (NIZORAL) 2 % shampoo, Daily for 2 weeks then once a month. Apply on damp skin, leave on for 5mins then rinse., Disp: 120 mL, Rfl: 3    Allergies   Allergen Reactions     Other Allergic Rhinitis     Hay Fever    Penicillins Rash       Social History     Socioeconomic History    Marital status: Single     Spouse name: Not on file    Number of children: Not on file    Years of education: Not on file    Highest education level: Not on file   Occupational History    Not on file   Tobacco Use    Smoking status: Never    Smokeless tobacco: Never   Vaping Use    Vaping status: Never Used   Substance and Sexual Activity    Alcohol use: Yes     Comment: socially    Drug use: No    Sexual activity: Yes     Partners: Male     Birth control/protection: Condom Male   Other Topics Concern    Not on file   Social History Narrative    Caffeine use     Social Determinants of Health     Financial Resource Strain: Not on file   Food Insecurity: Not on file   Transportation Needs: Not on file   Physical Activity: Not on file   Stress: Not on file   Social Connections: Not on file   Intimate Partner Violence: Not on file   Housing Stability: Not on file       Review of Systems     ROS:  Constitutional: Negative for fatigue and unexpected weight change.   Respiratory: Negative for cough and shortness of breath.    Cardiovascular: Negative for chest pain and palpitations.   Gastrointestinal: Negative for abdominal pain and change in bowel habits  Breasts:  Negative, other than as noted above.   Genitourinary: Negative, other than as noted above.   Psychiatric: Negative for mood difficulties.      Objective        Vitals:    01/03/24 1649   BP: 110/74       Physical Examination:    Patient appears well and is not in distress  Neck is supple without masses, no cervical or supraclavicular lymphadenopathy  Cardiovascular: regular rate and rhythm; no murmurs  Lungs: clear to auscultation bilaterally; no wheezes  Breasts are symmetrical without mass, tenderness, nipple discharge, skin changes or adenopathy.   Abdomen is soft and nontender without masses.   External genitals are normal without lesions or  rashes.  Urethral meatus and urethra are normal  Bladder is normal to palpation  Vagina is normal without discharge or bleeding.   Cervix is normal without discharge or lesion.   Uterus is normal, mobile, nontender without palpable mass.  Adnexa are normal, nontender, without palpable mass.

## 2024-03-13 LAB
DHEA-S SERPL-MCNC: 218 UG/DL (ref 18–391)
ESTRADIOL SERPL-MCNC: 23.3 PG/ML
FSH SERPL-ACNC: 5.81 MIU/ML
GLUCOSE SERPL-MCNC: 108 MG/DL (ref 65–99)
INSULIN SERPL-ACNC: 14.8 UIU/ML (ref 3.2–16.3)
LH SERPL-ACNC: 6.32 MIU/ML
PROLACTIN SERPL-MCNC: 23.02 NG/ML
TESTOST SERPL-MCNC: 60 NG/DL
TSH SERPL-ACNC: 3.21 UIU/ML (ref 0.45–5.33)

## 2024-03-16 LAB
17OHP SERPL-MCNC: 45.35 NG/DL
MIS SERPL-MCNC: 12.36 NG/ML

## 2024-03-19 ENCOUNTER — HOSPITAL ENCOUNTER (OUTPATIENT)
Dept: RADIOLOGY | Facility: HOSPITAL | Age: 29
Discharge: HOME | End: 2024-03-19
Payer: COMMERCIAL

## 2024-03-19 DIAGNOSIS — N92.6 IRREGULAR MENSES: ICD-10-CM

## 2024-03-19 DIAGNOSIS — Z78.9 ATTEMPTING TO CONCEIVE: ICD-10-CM

## 2024-03-19 PROCEDURE — 76856 US EXAM PELVIC COMPLETE: CPT

## 2024-03-19 PROCEDURE — 76830 TRANSVAGINAL US NON-OB: CPT

## 2024-03-20 ENCOUNTER — TELEPHONE (OUTPATIENT)
Dept: OBGYN CLINIC | Facility: CLINIC | Age: 29
End: 2024-03-20

## 2024-03-20 NOTE — TELEPHONE ENCOUNTER
Left Message for patient to call the office to set up follow up appt with Lauren Madrid to discuss labs and menses.

## 2024-03-20 NOTE — TELEPHONE ENCOUNTER
----- Message from January Davenport sent at 3/20/2024  7:33 AM EDT -----  Regarding: Schedule for appt.  Sent to the wrong bin please get patient scheduled for an appointment  ----- Message -----  From: Lauren Madrid PA-C  Sent: 3/19/2024   8:29 AM EDT  To: [facility]    AMH and 17-OH did result. No need to call [facility]. Please call this patient and get her a follow up appointment with me to review labs and menses

## 2024-03-29 ENCOUNTER — OFFICE VISIT (OUTPATIENT)
Dept: OBGYN CLINIC | Facility: CLINIC | Age: 29
End: 2024-03-29
Payer: COMMERCIAL

## 2024-03-29 VITALS — DIASTOLIC BLOOD PRESSURE: 78 MMHG | BODY MASS INDEX: 36.56 KG/M2 | WEIGHT: 213 LBS | SYSTOLIC BLOOD PRESSURE: 122 MMHG

## 2024-03-29 DIAGNOSIS — E28.2 PCOS (POLYCYSTIC OVARIAN SYNDROME): Primary | ICD-10-CM

## 2024-03-29 DIAGNOSIS — R73.01 ELEVATED FASTING GLUCOSE: ICD-10-CM

## 2024-03-29 PROCEDURE — 99213 OFFICE O/P EST LOW 20 MIN: CPT | Performed by: PHYSICIAN ASSISTANT

## 2024-03-29 NOTE — PROGRESS NOTES
Assessment/Plan:    1. PCOS (polycystic ovarian syndrome)  -Given PCOS appearance of right ovary on ultrasound along with oligomenorrhea, menstrual cycles reviewed patient does meet criteria for PCOS.  She does struggle with some acne and minimal hair growth on chin.  Reviewed management options for fertility purposes.  Reviewed the option of starting metformin to treat fasting glucose, insulin resistance regulate menstrual cycles.  Reviewed risk and benefits of this.  Reviewed common side effects of GI intolerance.  She is unsure if she would like to proceed with this option.  We will have her complete A1c and CMP before starting medications  -We then reviewed the option of starting of ovasitol which is a PCOS support supplement.  Patient is more willing to start this option.  -Reviewed that TSH is technically within normal range.  For conception we typically like TSH to be 2.5 or less.  Reviewed the option of starting low-dose of levothyroxine.  Patient would prefer not to take any additional medications at this time.  -Will obtain updated blood work and patient will trial ovasitol over the next 3 months until ready to actively conceive.  If menstrual cycles are still irregular by the summer when she desires to start trying for pregnancy recommended referral to [facility].  -Reviewed the importance of calling the office if patient is going 90 days without a menses for Provera to initiate withdrawal bleed.  -Patient will take time to consider all of these options.  She will call with questions.      2. Elevated fasting glucose  - Hemoglobin A1C; Future  - Comprehensive metabolic panel; Future      Subjective:      Patient ID: Maribel Morelos is a 28 y.o. female.    HPI    Patient presents to the office today to review lab work.  She has a history of oligomenorrhea cycles.  At the time of her annual in January she had reported stopping her OCPs about 3 to 4 months prior.  At the time of her annual  in January she had not really been tracking her cycles.  But reported a longstanding history of oligomenorrhea and cycles off of OCPs.  She has started to track her cycles more regularly.  She had a period in December and then just had another menses on 3/10/2024.    At the time of her annual fertility labs were ordered as patient is hoping to try for pregnancy starting in the summer.  AMH level was elevated at 12.  TSH slightly elevated for conception at 3.21.  Her fasting insulin was 14.8.  Her fasting glucose was elevated at 108.  The remainder of her labs were within normal limits.    She additionally completed a transvaginal ultrasound on 3/19/2024.  Her right ovary is mildly enlarged with numerous follicles consistent with PCOS appearance.  Left ovary was suboptimally viewed but grossly unremarkable.    The following portions of the patient's history were reviewed and updated as appropriate: allergies, current medications, past family history, past medical history, past social history, past surgical history, and problem list.    Review of Systems      Objective:      /78 (BP Location: Left arm, Patient Position: Sitting, Cuff Size: Large)   Wt 96.6 kg (213 lb)   LMP 03/10/2024 (Exact Date)   BMI 36.56 kg/m²          Physical Exam  Vitals reviewed.   Constitutional:       Appearance: Normal appearance.   HENT:      Head: Normocephalic and atraumatic.   Pulmonary:      Effort: Pulmonary effort is normal.   Abdominal:      General: Abdomen is flat. There is no distension.   Musculoskeletal:      Right lower leg: No edema.      Left lower leg: No edema.   Skin:     General: Skin is warm and dry.   Neurological:      General: No focal deficit present.      Mental Status: She is alert. Mental status is at baseline.   Psychiatric:         Mood and Affect: Mood normal.         Behavior: Behavior normal.         Thought Content: Thought content normal.         Judgment: Judgment normal.

## 2024-04-03 LAB
ALBUMIN SERPL-MCNC: 4.2 G/DL (ref 3.5–5.7)
ALP SERPL-CCNC: 61 U/L (ref 35–120)
ALT SERPL-CCNC: 12 U/L
ANION GAP SERPL CALCULATED.3IONS-SCNC: 12 MMOL/L (ref 3–11)
AST SERPL-CCNC: 14 U/L
BILIRUB SERPL-MCNC: 0.4 MG/DL (ref 0.2–1)
BUN SERPL-MCNC: 7 MG/DL (ref 7–25)
CALCIUM SERPL-MCNC: 9.4 MG/DL (ref 8.5–10.1)
CHLORIDE SERPL-SCNC: 100 MMOL/L (ref 100–109)
CO2 SERPL-SCNC: 27 MMOL/L (ref 21–31)
CREAT SERPL-MCNC: 0.87 MG/DL (ref 0.4–1.1)
CYTOLOGY CMNT CVX/VAG CYTO-IMP: ABNORMAL
EST. AVERAGE GLUCOSE BLD GHB EST-MCNC: 105 MG/DL
GFR/BSA.PRED SERPLBLD CYS-BASED-ARV: 92 ML/MIN/{1.73_M2}
GLUCOSE SERPL-MCNC: 89 MG/DL (ref 65–99)
HBA1C MFR BLD: 5.3 %
POTASSIUM SERPL-SCNC: 4.2 MMOL/L (ref 3.5–5.2)
PROT SERPL-MCNC: 7.3 G/DL (ref 6.3–8.3)
SODIUM SERPL-SCNC: 139 MMOL/L (ref 135–145)

## 2024-04-19 PROBLEM — E28.2 PCOS (POLYCYSTIC OVARIAN SYNDROME): Status: ACTIVE | Noted: 2024-04-19

## 2024-05-31 DIAGNOSIS — N91.2 AMENORRHEA: Primary | ICD-10-CM

## 2024-05-31 RX ORDER — MEDROXYPROGESTERONE ACETATE 10 MG/1
10 TABLET ORAL DAILY
Qty: 10 TABLET | Refills: 0 | Status: SHIPPED | OUTPATIENT
Start: 2024-05-31 | End: 2024-06-10

## 2024-07-11 ENCOUNTER — TELEPHONE (OUTPATIENT)
Age: 29
End: 2024-07-11

## 2024-07-11 DIAGNOSIS — Z11.1 SCREENING-PULMONARY TB: Primary | ICD-10-CM

## 2024-07-11 NOTE — TELEPHONE ENCOUNTER
Done, let pt know.  Blood test ordered for TB, if she wants the PPD one, then please enter that order

## 2024-07-11 NOTE — TELEPHONE ENCOUNTER
See previous notes, can you place order for PPD she has had this done in past. 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 She will get this done at her upcoming appointment 7/15 and then read on 7/17 if you ok this.    Please advise, thank you

## 2024-07-15 ENCOUNTER — OFFICE VISIT (OUTPATIENT)
Dept: INTERNAL MEDICINE CLINIC | Facility: CLINIC | Age: 29
End: 2024-07-15
Payer: COMMERCIAL

## 2024-07-15 VITALS
BODY MASS INDEX: 36.42 KG/M2 | WEIGHT: 218.6 LBS | SYSTOLIC BLOOD PRESSURE: 154 MMHG | DIASTOLIC BLOOD PRESSURE: 82 MMHG | OXYGEN SATURATION: 98 % | HEART RATE: 84 BPM | HEIGHT: 65 IN

## 2024-07-15 DIAGNOSIS — Z11.1 SCREENING FOR TUBERCULOSIS: Primary | ICD-10-CM

## 2024-07-15 DIAGNOSIS — Z00.00 ANNUAL PHYSICAL EXAM: ICD-10-CM

## 2024-07-15 DIAGNOSIS — Z88.0 HX OF PENICILLIN ALLERGY: ICD-10-CM

## 2024-07-15 PROBLEM — R03.0 ELEVATED BP WITHOUT DIAGNOSIS OF HYPERTENSION: Status: ACTIVE | Noted: 2024-07-15

## 2024-07-15 PROBLEM — E66.812 CLASS 2 OBESITY IN ADULT: Status: ACTIVE | Noted: 2024-07-15

## 2024-07-15 PROBLEM — E66.9 CLASS 2 OBESITY IN ADULT: Status: ACTIVE | Noted: 2024-07-15

## 2024-07-15 PROCEDURE — 99395 PREV VISIT EST AGE 18-39: CPT | Performed by: GENERAL ACUTE CARE HOSPITAL

## 2024-07-15 PROCEDURE — 86580 TB INTRADERMAL TEST: CPT

## 2024-07-15 NOTE — PROGRESS NOTES
"Ambulatory Visit  Name: Maribel Morelos      : 1995      MRN: [id number]  Encounter Provider: Diana Trinh MD  Encounter Date: 7/15/2024   [facility]    Assessment & Plan   1. Screening for tuberculosis  -     TB Skin Test  2. Annual physical exam  Assessment & Plan:  Routine blood works:reviewed  Vaccines:UTD  Cervical Cancer screen:UTD  Wt loss counseling:discussed diet and exercise  Depression screening:neg  Healthy diet and regular exercise    3. Hx of penicillin allergy  Assessment & Plan:  Discussed that many pt grow out of penicillin allergy.   Could consider a penicillin test to confirm that  Orders:  -     Ambulatory Referral to Allergy; Future       History of Present Illness     HPI  Annual physical  Review of Systems   Constitutional:  Negative for chills, fatigue and fever.   HENT:  Negative for congestion, nosebleeds, postnasal drip, sneezing, sore throat and trouble swallowing.    Eyes:  Negative for pain.   Respiratory:  Negative for cough, chest tightness, shortness of breath and wheezing.    Cardiovascular:  Negative for chest pain, palpitations and leg swelling.   Gastrointestinal:  Negative for abdominal pain, constipation, diarrhea, nausea and vomiting.   Endocrine: Negative for cold intolerance, heat intolerance, polydipsia and polyuria.   Genitourinary:  Negative for difficulty urinating, dysuria, flank pain and hematuria.   Musculoskeletal:  Negative for arthralgias and myalgias.   Skin:  Negative for rash.   Neurological:  Negative for dizziness, tremors, weakness and headaches.   Hematological:  Does not bruise/bleed easily.   Psychiatric/Behavioral:  Negative for confusion and dysphoric mood. The patient is not nervous/anxious.        Objective     /82   Pulse 84   Ht 5' 5.35\" (1.66 m)   Wt 99.2 kg (218 lb 9.6 oz)   SpO2 98%   BMI 35.98 kg/m²     Physical Exam  Constitutional:       General: She is not in acute distress.   "   Appearance: She is well-developed.   HENT:      Head: Normocephalic and atraumatic.      Right Ear: External ear normal.      Left Ear: External ear normal.   Eyes:      General: No scleral icterus.     Conjunctiva/sclera: Conjunctivae normal.   Neck:      Thyroid: No thyromegaly.      Trachea: No tracheal deviation.   Cardiovascular:      Rate and Rhythm: Normal rate and regular rhythm.      Heart sounds: Normal heart sounds.   Pulmonary:      Effort: Pulmonary effort is normal. No respiratory distress.      Breath sounds: Normal breath sounds. No wheezing or rales.   Abdominal:      General: Bowel sounds are normal.      Palpations: Abdomen is soft.      Tenderness: There is no abdominal tenderness. There is no guarding or rebound.   Musculoskeletal:      Cervical back: Normal range of motion and neck supple.   Lymphadenopathy:      Cervical: No cervical adenopathy.   Neurological:      Mental Status: She is alert and oriented to person, place, and time.   Psychiatric:         Behavior: Behavior normal.         Thought Content: Thought content normal.         Judgment: Judgment normal.       Administrative Statements

## 2024-07-15 NOTE — ASSESSMENT & PLAN NOTE
Routine blood works:reviewed  Vaccines:UTD  Cervical Cancer screen:UTD  Wt loss counseling:discussed diet and exercise  Depression screening:neg  Healthy diet and regular exercise

## 2024-07-15 NOTE — ASSESSMENT & PLAN NOTE
Discussed that many pt grow out of penicillin allergy.   Could consider a penicillin test to confirm that

## 2024-07-17 ENCOUNTER — CLINICAL SUPPORT (OUTPATIENT)
Dept: INTERNAL MEDICINE CLINIC | Facility: CLINIC | Age: 29
End: 2024-07-17

## 2024-07-17 DIAGNOSIS — Z11.1 SCREENING FOR TUBERCULOSIS: Primary | ICD-10-CM

## 2024-07-17 LAB
INDURATION: 0 MM
TB SKIN TEST: NEGATIVE

## 2024-08-14 PROBLEM — Z11.1 SCREENING FOR TUBERCULOSIS: Status: RESOLVED | Noted: 2024-07-15 | Resolved: 2024-08-14

## 2024-10-08 DIAGNOSIS — N92.6 IRREGULAR MENSES: ICD-10-CM

## 2024-10-08 DIAGNOSIS — E28.2 PCOS (POLYCYSTIC OVARIAN SYNDROME): Primary | ICD-10-CM

## 2024-10-08 DIAGNOSIS — Z78.9 ATTEMPTING TO CONCEIVE: ICD-10-CM

## 2024-10-14 ENCOUNTER — PATIENT MESSAGE (OUTPATIENT)
Dept: OBGYN CLINIC | Facility: CLINIC | Age: 29
End: 2024-10-14

## 2024-10-14 DIAGNOSIS — Z32.01 POSITIVE PREGNANCY TEST: Primary | ICD-10-CM

## 2024-10-15 ENCOUNTER — APPOINTMENT (OUTPATIENT)
Dept: LAB | Facility: CLINIC | Age: 29
End: 2024-10-15
Payer: COMMERCIAL

## 2024-10-15 DIAGNOSIS — Z32.01 POSITIVE PREGNANCY TEST: ICD-10-CM

## 2024-10-15 LAB — B-HCG SERPL-ACNC: 7797 MIU/ML (ref 0–5)

## 2024-10-15 PROCEDURE — 84702 CHORIONIC GONADOTROPIN TEST: CPT

## 2024-10-15 PROCEDURE — 36415 COLL VENOUS BLD VENIPUNCTURE: CPT

## 2024-10-16 ENCOUNTER — TELEPHONE (OUTPATIENT)
Age: 29
End: 2024-10-16

## 2024-10-16 NOTE — TELEPHONE ENCOUNTER
Patient called to get her results from her pregnancy test and its positive. I tried to schedule her D&V appt but there were no available spots in any locations for [facility].  Patient is unsure of her LMP so we went off of her HCG levels in her results. It puts the patient at around 5w or so. Patient stated that she gets out of work around 215 every day and cannot have her phone out at work. But she did say that we can leave a detailed message on her VM. Please follow up with the patient

## 2024-10-16 NOTE — TELEPHONE ENCOUNTER
----- Message from Lauren Madrid PA-C sent at 10/16/2024  8:11 AM EDT -----  Please call this patient and let her know blood work is positive for pregnancy! We should get her scheduled for a first trimester dating scan. Please help to facilitate scheduling

## 2024-10-22 NOTE — TELEPHONE ENCOUNTER
Maribel chose to schedule in [place] advised she would be delivering at [place] she understood.      Scheduled for 11/4 with

## 2024-10-22 NOTE — TELEPHONE ENCOUNTER
Stiven for patient     Stated we are still currently looking for an appt but also offered her to try [place] location being  that she lives in [place]    If not we will continue to look for appts for her

## 2024-11-06 ENCOUNTER — ULTRASOUND (OUTPATIENT)
Age: 29
End: 2024-11-06
Payer: COMMERCIAL

## 2024-11-06 VITALS
BODY MASS INDEX: 36.25 KG/M2 | SYSTOLIC BLOOD PRESSURE: 118 MMHG | DIASTOLIC BLOOD PRESSURE: 88 MMHG | HEIGHT: 65 IN | WEIGHT: 217.6 LBS

## 2024-11-06 DIAGNOSIS — Z13.79 GENETIC SCREENING: ICD-10-CM

## 2024-11-06 DIAGNOSIS — N91.2 AMENORRHEA: Primary | ICD-10-CM

## 2024-11-06 DIAGNOSIS — Z33.1 NORMAL PREGNANCY, INCIDENTAL: ICD-10-CM

## 2024-11-06 PROCEDURE — 99213 OFFICE O/P EST LOW 20 MIN: CPT | Performed by: OBSTETRICS & GYNECOLOGY

## 2024-11-06 PROCEDURE — 76817 TRANSVAGINAL US OBSTETRIC: CPT | Performed by: OBSTETRICS & GYNECOLOGY

## 2024-11-06 NOTE — PROGRESS NOTES
"Ambulatory Visit  Name: Maribel Morelos      : 1995      MRN: [id number]  Encounter Provider: Georgia Stephen MD  Encounter Date: 2024   Encounter department: [facility] [place]    Assessment & Plan  Normal pregnancy, incidental    Orders:    Ambulatory Referral to Maternal Fetal Medicine; Future    Genetic screening    Orders:    Ambulatory Referral to Maternal Fetal Medicine; Future    Amenorrhea    Orders:    AMB US OB < 14 weeks single or first gestation level 1      History of Present Illness     Maribel Morelos is a 29 y.o. female who presents for early pregnancy ultrasound.  Planned/desired pregnancy.  Known PCOS with irregular cycles - uncertain of LMP  Mild cramping, no bleeding.   Accompanied by partner.       Review of Systems        Objective     /88 (BP Location: Left arm, Patient Position: Sitting, Cuff Size: Standard)   Ht 5' 5.35\" (1.66 m)   Wt 98.7 kg (217 lb 9.6 oz)   LMP 2024 (Exact Date)   BMI 35.82 kg/m²     Physical Exam  Vitals reviewed.   Constitutional:       Appearance: Normal appearance. She is obese.   Neurological:      Mental Status: She is alert.   Psychiatric:         Mood and Affect: Mood normal.         Behavior: Behavior normal.         "

## 2024-11-19 ENCOUNTER — TELEPHONE (OUTPATIENT)
Dept: OBGYN CLINIC | Facility: MEDICAL CENTER | Age: 29
End: 2024-11-19

## 2024-11-19 NOTE — TELEPHONE ENCOUNTER
Attempted to contact pt to reschedule OB intake - Navigator will be out of the office on Friday 11/29/24, left detailed msg for pt to call office to reschedule appointment, OB intakes can be scheduled Wednesdays and Fridays

## 2024-11-25 NOTE — PROGRESS NOTES
OB INTAKE INTERVIEW 2024    Patient is 29 y.o. who presents for OB intake at 11w3d.  She is accompanied by her significant other during this encounter.  The father of her baby (Santi Romero) is involved in the pregnancy.      Patient's last menstrual period was 2024 (exact date).  Ultrasound: Measured 8 weeks 3 days on 2024   Estimated Date of Delivery: 6/15/25 changed by dating ultrasound.     Signs/Symptoms of Pregnancy  Current pregnancy symptoms: breast tenderness, fatigue, nausea, and frequent urination and occasional vomitting  Headaches: no  Cramping: YES - occasional in early pregnancy  Spotting: no  PICA cravings: no    Diabetes:  Body mass index is 35.68 kg/m².  If patient has 1 or more, please order early 1 hour GTT  History of GDM: no  BMI >35 YES - initial BMI 35.8  History of PCOS or current metformin use: YES  History of LGA/macrosomic infant (4000g/9lbs): no    If patient has 2 or more, please order early 1 hour GTT  BMI>30 YES - initial BMI 35.8  AMA: no  First degree relative with type 2 diabetes: YES - pt father  History of chronic HTN, hyperlipidemia, elevated A1C: no  High risk race (, , ,  or ): no    Hypertension: if you answer yes to any of the following, please order baseline preeclampsia labs (cbc, comprehensive metabolic panel, urine protein creatinine ratio, uric acid)  History of of chronic HTN: no  History of gestational HTN: no  History of preeclampsia, eclampsia, or HELLP syndrome: no  History of diabetes: no  History of lupus,sjogrens syndrome, kidney disease no    Thyroid: if yes order TSH with reflex T4  History of thyroid disease: no    Bleeding Disorder or Hx of DVT - patient or first degree relative with history of. Order the following if not done previously.   (Factor V, antithrombin III, prothrombin gene mutation, protein C and S Ag, lupus anticoagulant, anticardiolipin, beta-2  glycoprotein):   no    OB/GYN:  History of abnormal pap smear: no       Date of last pap smear: 2022  History of HPV: no  History of Herpes/HSV: no  History of other STI: (gonorrhea, chlamydia, trich) no  History of prior : no  History of prior : no  History of  delivery prior to 36 weeks 6 days: no  History of blood transfusion: no  Ok for blood transfusion: YES    Substance screening-   History of tobacco use no  Currently using tobacco no  Substance Use Screen Level:   No risk    MRSA Screening:   Does the pt have a hx of MRSA? no    Mental Health:  Hx of/or current dx of depression: no  Hx of/or current dx of anxiety: YES  Medications: no never medicated  EPDS Screen:  Negative / score: 1    Immunizations:  Influenza vaccine given this season: NO - will decline  Discussed Tdap vaccine:  YES  Discussed COVID Vaccine:  YES - had in the past  History of Varicella or Vaccination had varicella vaccine    Genetic/MFM:  Do you or your partner have a history of any of the following in yourselves or first degree relatives?  Cystic fibrosis: no  Spinal muscular atrophy: no  Hemoglobinopathy/Sickle Cell/Thalassemia: no  Fragile X Intellectual Disability: no    Discussed Carrier Screening being completed once in a lifetime as a standard of care lab test. Place orders for Cystic Fibrosis Gene Test (ABY717) and Spinal Muscular Atrophy DNA (MGL1013).  Patient was informed that prior authorization needs to be completed for these tests and this may take 7-10 business days.  Patient does desire testing for Cystic Fibrosis and Spinal Muscular Atrophy.    ACOG Patient Education Cystic Fibrosis and Spinal Muscular Atrophy prenatal screening given.    Appointment for Nuchal Translucency Ultrasound at [facility] is scheduled for 24.    Interview education:  [facility] Pregnancy Essentials Book reviewed, discussed and attached to their AVS: YES     Nurse/Family Partnership-patient may qualify NO; referral placed  NO     Prenatal lab work scripts: YES    Extra labs ordered: Cystic Fibrosis gene test, Spinal muscular atrophy DNA, Hgb Fractionation Cascade, and 1 hour GTT    Aspirin/Preeclampsia Screen    Risk Level Risk Factor Recommendation   LOW Prior Uncomplicated full-term delivery no No Aspirin recommendation        MODERATE Nulliparity YES Recommend low-dose aspirin if     BMI>30 YES - initial BMI 35.8 2 or more moderate risk factors    Family History Preeclampsia (mother/sister) no     35yr old or greater no     Black Race, Concern for SDOH/Low Socioeconomic no     IVF Pregnancy  no     Personal History Risks (low birth weight, prior adverse preg outcome, >10yr preg interval) no         HIGH History of Preeclampsia no Recommend low-dose aspirin if     Multifetal gestation no 1 or more high risk factors    Chronic HTN no     Type 1 or 2 Diabetes no     Renal Disease no     Autoimmune Disease  no      Contraindications to ASA therapy:  NSAID/ ASA allergy: no  Nasal polyps: no  Asthma with history of ASA induced bronchospasm: no    Relative contraindications:  History of GI bleed: no  Active peptic ulcer disease: no  Severe hepatic dysfunction: no    Patient does meet recommendation to take ASA 162mg during this pregnancy from 12-36 wks to lower her risk of preeclampsia.  Instructions given and patient verbalized understanding.    The patient has a history now or in prior pregnancy notable for:  obesity, anxiety, migraines, PCOS       Details that I feel the provider should be aware of: Maribel was seen here in office for her OB Intake visit, Hx obtained, c/o nausea/vomiting, discussed B6/Unisom dosing, also reviewed avoiding an empty belly. Discussed need for 2 tabs LDASA therapy d/t elevated pre-e risk, pt verbalized understanding.  Will start after discussing w/provider at next PNV.  Reviewed medications safe to take in pregnancy.  [facility] Essentials packet/link reviewed. [facility] Baby and Me classes reviewed and how to  register for classes     PN1 visit scheduled. The patient was oriented to our practice, the navigator role, reviewed delivering physicians and [facility] for delivery. All questions were answered.    Interviewed by: Merle Osborn RN

## 2024-11-25 NOTE — PATIENT INSTRUCTIONS
Congratulations on your pregnancy!  We thank you for allowing us to participate in your care.    NEXT STEPS    Go to the lab to have your prenatal bloodwork competed if you have not already done so.  There is a listing of [facility] and locations in your prenatal folder. You may also visit [email or web address] or call [phone].   Please be aware that some insurance companies may require you to go to a specific lab (ex. [facility] or [facility]). You can verify this by contacting your insurance company.   If you have decided to be screened for CF and SMA genetic testing, these tests require prior authorization and scheduling.  Prior Authorization is not a guarantee of payment. There may be out of pocket expenses that includes copays, deductibles and or coinsurance for your individual plan.  Please call [phone] if our team has not contacted you in 7 business days.  Please have your blood work completed prior to you next prenatal visit.    If you have decided to have genetic testing done at Maternal Fetal Medicine, that will be scheduled by [facility]. You may have already scheduled this appointment.  If not, please call their office to schedule this appointment.  Based on the referral placed by our office, they will know how to schedule you appropriately.    Contact information for Maternal Fetal Medicine is located in your prenatal folder. The main phone number to their office is [phone].     Return to our office for your first routine prenatal visit.     Warning Signs During Pregnancy - If you experience any problems or concerns, call the office directly.  The list below includes warning signs your providers would like you to be aware of.  If you experience any of these at any time during your pregnancy, please call us as soon as possible.    Vaginal bleeding   Sharp abdominal pain that does not go away   Fever (more than 100.4?F and is not relieved with Tylenol)   Persistent vomiting lasting greater than 24  hours   Chest pain/Shortness of breath   Pain or burning when you urinate     Call the OFFICE [phone] for any questions/emergencies.  At night or on the weekend, calls go through a triage service, please indicate it is an emergency and the DOCTOR on call will be paged.    Remember to only use MyChart for non-urgent concerns or questions.    Our doctors deliver at [facility] in [place]. The address is provided below.     [facility]  [address] [place]  [address]     Please click on the links below to review our Pregnancy Essential Guide.    [facility] Pregnancy Essentials Guide  [facility] Women's Health ([email or web address])     [facility] - Virtual Tour ([place])      To learn more about the Prenatal Education classes that [facility] offers, click the link below.  Prenatal Education Classes    Click on the link below to review [facility] Lab locations.  [facility] Lab Locations    [email or web address] resource  [employer] is a tool to connect you to community resources you may need.      Thank you,   Merle ZHANG, RN  OB Nurse Navigator

## 2024-11-27 ENCOUNTER — INITIAL PRENATAL (OUTPATIENT)
Age: 29
End: 2024-11-27

## 2024-11-27 VITALS
HEIGHT: 65 IN | DIASTOLIC BLOOD PRESSURE: 72 MMHG | BODY MASS INDEX: 35.72 KG/M2 | SYSTOLIC BLOOD PRESSURE: 138 MMHG | WEIGHT: 214.4 LBS

## 2024-11-27 DIAGNOSIS — Z87.42 HISTORY OF POLYCYSTIC OVARIAN SYNDROME: ICD-10-CM

## 2024-11-27 DIAGNOSIS — O99.210 OBESITY IN PREGNANCY: ICD-10-CM

## 2024-11-27 DIAGNOSIS — Z34.01 ENCOUNTER FOR SUPERVISION OF NORMAL FIRST PREGNANCY IN FIRST TRIMESTER: Primary | ICD-10-CM

## 2024-11-27 DIAGNOSIS — Z83.3 FAMILY HISTORY OF DIABETES MELLITUS IN FATHER: ICD-10-CM

## 2024-11-27 DIAGNOSIS — Z31.430 ENCOUNTER OF FEMALE FOR TESTING FOR GENETIC DISEASE CARRIER STATUS FOR PROCREATIVE MANAGEMENT: ICD-10-CM

## 2024-11-27 PROCEDURE — OBC

## 2024-12-02 ENCOUNTER — ROUTINE PRENATAL (OUTPATIENT)
Dept: PERINATAL CARE | Facility: OTHER | Age: 29
End: 2024-12-02
Payer: COMMERCIAL

## 2024-12-02 VITALS
WEIGHT: 214.8 LBS | HEIGHT: 65 IN | BODY MASS INDEX: 35.79 KG/M2 | DIASTOLIC BLOOD PRESSURE: 72 MMHG | SYSTOLIC BLOOD PRESSURE: 120 MMHG | HEART RATE: 88 BPM

## 2024-12-02 DIAGNOSIS — E66.812 OBESITY, CLASS II, BMI 35-39.9: ICD-10-CM

## 2024-12-02 DIAGNOSIS — Z36.82 ENCOUNTER FOR (NT) NUCHAL TRANSLUCENCY SCAN: Primary | ICD-10-CM

## 2024-12-02 DIAGNOSIS — Z3A.12 12 WEEKS GESTATION OF PREGNANCY: ICD-10-CM

## 2024-12-02 DIAGNOSIS — O99.211 OBESITY AFFECTING PREGNANCY IN FIRST TRIMESTER, UNSPECIFIED OBESITY TYPE: ICD-10-CM

## 2024-12-02 DIAGNOSIS — Z13.79 GENETIC SCREENING: ICD-10-CM

## 2024-12-02 DIAGNOSIS — Z33.1 NORMAL PREGNANCY, INCIDENTAL: ICD-10-CM

## 2024-12-02 PROCEDURE — 99203 OFFICE O/P NEW LOW 30 MIN: CPT | Performed by: OBSTETRICS & GYNECOLOGY

## 2024-12-02 PROCEDURE — 76813 OB US NUCHAL MEAS 1 GEST: CPT | Performed by: OBSTETRICS & GYNECOLOGY

## 2024-12-02 PROCEDURE — 76801 OB US < 14 WKS SINGLE FETUS: CPT | Performed by: OBSTETRICS & GYNECOLOGY

## 2024-12-02 RX ORDER — ASPIRIN 81 MG/1
162 TABLET ORAL DAILY
Qty: 180 TABLET | Refills: 2 | Status: SHIPPED | OUTPATIENT
Start: 2024-12-02

## 2024-12-02 NOTE — PROGRESS NOTES
"[facility]: Maribel Morelos was seen today for nuchal translucency ultrasound.  See ultrasound report under \"OB Procedures\" tab.   Please don't hesitate to contact our office with any concerns or questions.  -Antonia Tobar MD    "

## 2024-12-02 NOTE — PROGRESS NOTES
Patient chose to have [facility] [id number] Non-Invasive Prenatal Screen [id number] PLUS w/ SCA, WITH fetal sex (per pt request).  Patient choose to be billed through insurance.     Patient given brochure and is aware [facility] will contact patient's insurance and coordinate coverage.  Provided [facility] contact information. General inquiries [phone], Cost estimates [phone] and [facility] Billing [phone]. Website [email or web address].     Blood collection tubes labeled with patient identifiers (name, medical record number, and date of birth).     Filled out [facility] order form. Patient chose to be sent to an outpatient lab to complete blood work. Patient has additional lab work to be completed.    If patient chose to have blood work drawn at a [facility] lab we requested patient notify MFM (via phone call or [email or web address] message) when blood collected so office can follow up on results.       Maternal Fetal Medicine will have results in approximately 5-7 business days and will call patient or notify via [email or web address].  Patient aware viewing lab result online will reveal fetal sex if ordered.    Patient verbalized understanding of all instructions and no questions at this time.

## 2024-12-02 NOTE — LETTER
"2024    Georgia Thomas MD  [address] [address]    Patient: Maribel Morelos   YOB: 1995   Date of Visit: 2024   Gestational age 12w1d   Nature of this communication: Routine       Dear Dr Thomas,    This patient was seen recently in our  office.  The content of my evaluation today is in the ultrasound report under \"OB Procedures\" tab.     Please don't hesitate to contact our office with any concerns or questions.     Sincerely,      Antonia Tobar MD  Attending Physician, Maternal-Fetal Medicine  [facility]      "

## 2024-12-03 ENCOUNTER — APPOINTMENT (OUTPATIENT)
Dept: LAB | Facility: CLINIC | Age: 29
End: 2024-12-03
Payer: COMMERCIAL

## 2024-12-03 DIAGNOSIS — O99.210 OBESITY IN PREGNANCY: ICD-10-CM

## 2024-12-03 DIAGNOSIS — Z34.01 ENCOUNTER FOR SUPERVISION OF NORMAL FIRST PREGNANCY IN FIRST TRIMESTER: ICD-10-CM

## 2024-12-03 DIAGNOSIS — Z83.3 FAMILY HISTORY OF DIABETES MELLITUS IN FATHER: ICD-10-CM

## 2024-12-03 DIAGNOSIS — Z31.430 ENCOUNTER OF FEMALE FOR TESTING FOR GENETIC DISEASE CARRIER STATUS FOR PROCREATIVE MANAGEMENT: ICD-10-CM

## 2024-12-03 DIAGNOSIS — Z36.0 SCREENING FOR CHROMOSOMAL ANOMALIES BY AMNIOCENTESIS: ICD-10-CM

## 2024-12-03 DIAGNOSIS — Z87.42 HISTORY OF POLYCYSTIC OVARIAN SYNDROME: ICD-10-CM

## 2024-12-03 LAB
ABO GROUP BLD: NORMAL
BACTERIA UR QL AUTO: NORMAL /HPF
BASOPHILS # BLD AUTO: 0.04 THOUSANDS/ΜL (ref 0–0.1)
BASOPHILS NFR BLD AUTO: 0 % (ref 0–1)
BILIRUB UR QL STRIP: NEGATIVE
BLD GP AB SCN SERPL QL: NEGATIVE
CLARITY UR: CLEAR
COLOR UR: COLORLESS
EOSINOPHIL # BLD AUTO: 0.08 THOUSAND/ΜL (ref 0–0.61)
EOSINOPHIL NFR BLD AUTO: 1 % (ref 0–6)
ERYTHROCYTE [DISTWIDTH] IN BLOOD BY AUTOMATED COUNT: 12.9 % (ref 11.6–15.1)
GLUCOSE 1H P 50 G GLC PO SERPL-MCNC: 151 MG/DL (ref 70–134)
GLUCOSE UR STRIP-MCNC: ABNORMAL MG/DL
HCT VFR BLD AUTO: 45.4 % (ref 34.8–46.1)
HGB BLD-MCNC: 15.2 G/DL (ref 11.5–15.4)
HGB UR QL STRIP.AUTO: NEGATIVE
IMM GRANULOCYTES # BLD AUTO: 0.05 THOUSAND/UL (ref 0–0.2)
IMM GRANULOCYTES NFR BLD AUTO: 0 % (ref 0–2)
KETONES UR STRIP-MCNC: NEGATIVE MG/DL
LEUKOCYTE ESTERASE UR QL STRIP: ABNORMAL
LYMPHOCYTES # BLD AUTO: 2.77 THOUSANDS/ΜL (ref 0.6–4.47)
LYMPHOCYTES NFR BLD AUTO: 23 % (ref 14–44)
MCH RBC QN AUTO: 29.5 PG (ref 26.8–34.3)
MCHC RBC AUTO-ENTMCNC: 33.5 G/DL (ref 31.4–37.4)
MCV RBC AUTO: 88 FL (ref 82–98)
MONOCYTES # BLD AUTO: 0.56 THOUSAND/ΜL (ref 0.17–1.22)
MONOCYTES NFR BLD AUTO: 5 % (ref 4–12)
NEUTROPHILS # BLD AUTO: 8.68 THOUSANDS/ΜL (ref 1.85–7.62)
NEUTS SEG NFR BLD AUTO: 71 % (ref 43–75)
NITRITE UR QL STRIP: NEGATIVE
NON-SQ EPI CELLS URNS QL MICRO: NORMAL /HPF
NRBC BLD AUTO-RTO: 0 /100 WBCS
PH UR STRIP.AUTO: 6 [PH]
PLATELET # BLD AUTO: 331 THOUSANDS/UL (ref 149–390)
PMV BLD AUTO: 11 FL (ref 8.9–12.7)
PROT UR STRIP-MCNC: NEGATIVE MG/DL
RBC # BLD AUTO: 5.15 MILLION/UL (ref 3.81–5.12)
RBC #/AREA URNS AUTO: NORMAL /HPF
RH BLD: NEGATIVE
RUBV IGG SERPL IA-ACNC: 26.3 IU/ML
SP GR UR STRIP.AUTO: 1.01 (ref 1–1.03)
SPECIMEN EXPIRATION DATE: NORMAL
UROBILINOGEN UR STRIP-ACNC: <2 MG/DL
WBC # BLD AUTO: 12.18 THOUSAND/UL (ref 4.31–10.16)
WBC #/AREA URNS AUTO: NORMAL /HPF

## 2024-12-03 PROCEDURE — 85025 COMPLETE CBC W/AUTO DIFF WBC: CPT

## 2024-12-03 PROCEDURE — 87340 HEPATITIS B SURFACE AG IA: CPT

## 2024-12-03 PROCEDURE — 81001 URINALYSIS AUTO W/SCOPE: CPT

## 2024-12-03 PROCEDURE — 83020 HEMOGLOBIN ELECTROPHORESIS: CPT

## 2024-12-03 PROCEDURE — 86850 RBC ANTIBODY SCREEN: CPT

## 2024-12-03 PROCEDURE — 81220 CFTR GENE COM VARIANTS: CPT

## 2024-12-03 PROCEDURE — 86706 HEP B SURFACE ANTIBODY: CPT

## 2024-12-03 PROCEDURE — 81329 SMN1 GENE DOS/DELETION ALYS: CPT

## 2024-12-03 PROCEDURE — 86901 BLOOD TYPING SEROLOGIC RH(D): CPT

## 2024-12-03 PROCEDURE — 87086 URINE CULTURE/COLONY COUNT: CPT

## 2024-12-03 PROCEDURE — 87389 HIV-1 AG W/HIV-1&-2 AB AG IA: CPT

## 2024-12-03 PROCEDURE — 86780 TREPONEMA PALLIDUM: CPT

## 2024-12-03 PROCEDURE — 36415 COLL VENOUS BLD VENIPUNCTURE: CPT

## 2024-12-03 PROCEDURE — 86900 BLOOD TYPING SEROLOGIC ABO: CPT

## 2024-12-03 PROCEDURE — 86803 HEPATITIS C AB TEST: CPT

## 2024-12-03 PROCEDURE — 82950 GLUCOSE TEST: CPT

## 2024-12-03 PROCEDURE — 86762 RUBELLA ANTIBODY: CPT

## 2024-12-04 ENCOUNTER — RESULTS FOLLOW-UP (OUTPATIENT)
Age: 29
End: 2024-12-04

## 2024-12-04 ENCOUNTER — INITIAL PRENATAL (OUTPATIENT)
Age: 29
End: 2024-12-04
Payer: COMMERCIAL

## 2024-12-04 VITALS — SYSTOLIC BLOOD PRESSURE: 128 MMHG | WEIGHT: 215 LBS | DIASTOLIC BLOOD PRESSURE: 78 MMHG | BODY MASS INDEX: 35.78 KG/M2

## 2024-12-04 DIAGNOSIS — Z34.01 PRIMIPAROUS, FIRST TRIMESTER: ICD-10-CM

## 2024-12-04 DIAGNOSIS — Z34.01 ENCOUNTER FOR SUPERVISION OF NORMAL FIRST PREGNANCY IN FIRST TRIMESTER: Primary | ICD-10-CM

## 2024-12-04 DIAGNOSIS — O99.810 ABNORMAL GLUCOSE TOLERANCE TEST (GTT) DURING PREGNANCY, ANTEPARTUM: Primary | ICD-10-CM

## 2024-12-04 DIAGNOSIS — Z11.3 SCREENING FOR STD (SEXUALLY TRANSMITTED DISEASE): ICD-10-CM

## 2024-12-04 LAB
BACTERIA UR CULT: NORMAL
HBV SURFACE AB SER-ACNC: 59.1 MIU/ML
HBV SURFACE AG SER QL: NORMAL
HCV AB SER QL: NORMAL
HIV 1+2 AB+HIV1 P24 AG SERPL QL IA: NORMAL
HIV 2 AB SERPL QL IA: NORMAL
HIV1 AB SERPL QL IA: NORMAL
HIV1 P24 AG SERPL QL IA: NORMAL
SL AMB  POCT GLUCOSE, UA: NEGATIVE
SL AMB POCT URINE PROTEIN: ABNORMAL
TREPONEMA PALLIDUM IGG+IGM AB [PRESENCE] IN SERUM OR PLASMA BY IMMUNOASSAY: NORMAL

## 2024-12-04 PROCEDURE — PNV: Performed by: OBSTETRICS & GYNECOLOGY

## 2024-12-04 PROCEDURE — 81002 URINALYSIS NONAUTO W/O SCOPE: CPT | Performed by: OBSTETRICS & GYNECOLOGY

## 2024-12-04 PROCEDURE — 87491 CHLMYD TRACH DNA AMP PROBE: CPT | Performed by: OBSTETRICS & GYNECOLOGY

## 2024-12-04 PROCEDURE — 87591 N.GONORRHOEAE DNA AMP PROB: CPT | Performed by: OBSTETRICS & GYNECOLOGY

## 2024-12-04 NOTE — PROGRESS NOTES
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 Encouraged drinking more water.     GC/CH collected today  Blue folder given at nurse intake

## 2024-12-05 LAB
C TRACH DNA SPEC QL NAA+PROBE: NEGATIVE
HGB A MFR BLD: 2.7 % (ref 1.8–3.2)
HGB A MFR BLD: 97.3 % (ref 96.4–98.8)
HGB F MFR BLD: 0 % (ref 0–2)
HGB FRACT BLD-IMP: NORMAL
HGB S MFR BLD: 0 %
N GONORRHOEA DNA SPEC QL NAA+PROBE: NEGATIVE

## 2024-12-07 ENCOUNTER — APPOINTMENT (OUTPATIENT)
Dept: LAB | Facility: CLINIC | Age: 29
End: 2024-12-07
Payer: COMMERCIAL

## 2024-12-07 DIAGNOSIS — O99.810 ABNORMAL GLUCOSE TOLERANCE TEST (GTT) DURING PREGNANCY, ANTEPARTUM: ICD-10-CM

## 2024-12-07 DIAGNOSIS — Z34.01 PRIMIPAROUS, FIRST TRIMESTER: ICD-10-CM

## 2024-12-08 LAB
CFDNA.FET/CFDNA.TOTAL SFR FETUS: NORMAL %
CITATION REF LAB TEST: NORMAL
FET 13+18+21+X+Y ANEUP PLAS.CFDNA: NEGATIVE
FET CHR 21 TS PLAS.CFDNA QL: NEGATIVE
FET CHR 21 TS PLAS.CFDNA QL: NEGATIVE
FET MS X RISK WBC.DNA+CFDNA QL: NOT DETECTED
FET SEX PLAS.CFDNA DOSAGE CFDNA: NORMAL
FET TS 13 RISK PLAS.CFDNA QL: NEGATIVE
FET X + Y ANEUP RISK PLAS.CFDNA SEQ-IMP: NOT DETECTED
GA EST FROM CONCEPTION DATE: NORMAL D
GESTATIONAL AGE > 9:: YES
LAB DIRECTOR NAME PROVIDER: NORMAL
LAB DIRECTOR NAME PROVIDER: NORMAL
LABORATORY COMMENT REPORT: NORMAL
LIMITATIONS OF THE TEST: NORMAL
NEGATIVE PREDICTIVE VALUE: NORMAL
PERFORMANCE CHARACTERISTICS: NORMAL
POSITIVE PREDICTIVE VALUE: NORMAL
REF LAB TEST METHOD: NORMAL
SL AMB NOTE:: NORMAL
TEST PERFORMANCE INFO SPEC: NORMAL

## 2024-12-09 ENCOUNTER — RESULTS FOLLOW-UP (OUTPATIENT)
Facility: HOSPITAL | Age: 29
End: 2024-12-09

## 2024-12-09 DIAGNOSIS — Z34.02 PREGNANCY, SUPERVISION OF FIRST, SECOND TRIMESTER: ICD-10-CM

## 2024-12-09 DIAGNOSIS — Z3A.13 13 WEEKS GESTATION OF PREGNANCY: ICD-10-CM

## 2024-12-09 DIAGNOSIS — O99.810 ABNORMAL GLUCOSE TOLERANCE TEST (GTT) DURING PREGNANCY, ANTEPARTUM: Primary | ICD-10-CM

## 2024-12-09 LAB
CITATION REF LAB TEST: NORMAL
CLINICAL INFO: NORMAL
ETHNIC BACKGROUND STATED: NORMAL
GENE DIS ANL CARRIER INTERP-IMP: NORMAL
GENE MUT TESTED BLD/T: NORMAL
LAB DIRECTOR NAME PROVIDER: NORMAL
REASON FOR REFERRAL (NARRATIVE): NORMAL
RECOMMENDATION PATIENT DOC-IMP: NORMAL
REF LAB TEST METHOD: NORMAL
SERVICE CMNT-IMP: NORMAL
SMN1 GENE MUT ANL BLD/T: NORMAL
SPECIMEN SOURCE: NORMAL

## 2024-12-10 ENCOUNTER — TELEPHONE (OUTPATIENT)
Dept: PERINATAL CARE | Facility: OTHER | Age: 29
End: 2024-12-10

## 2024-12-10 NOTE — TELEPHONE ENCOUNTER
Called patient to schedule meter education appointment per message from Anuradha Padgett RD. LVM informing her that we would like to get her schedule for this appointment since she had an abnormal 1 hr GTT and cannot tolerate the 3 hr GTT. Requested patient call the office back at [phone] to schedule this appointment.

## 2024-12-10 NOTE — TELEPHONE ENCOUNTER
----- Message from Anuradha HALE sent at 12/10/2024 10:05 AM EST -----  Regarding: FW: abnormal 1 hour GTT  Please schedule patient for 30min meter education appointment d/t unable to tolerate 3hr GTT.     Thank you!    Anuradha Padgett RD  Diabetes Educator   [facility]  Diabetes and Pregnancy Program  ----- Message -----  From: Merle Osborn RN  Sent: 12/9/2024  12:04 PM EST  To: #  Subject: abnormal 1 hour GTT                              FYI - I just placed a referral for Maribel.  She had an abnormal 1 hour GTT and was not able to tolerate the 3 hour GTT. She is currently 13 weeks.  Can you please her up with any education that she might need?  Thank you    Regards,  Merle ZHANG RN  OB Nurse Navigator

## 2024-12-23 ENCOUNTER — TELEMEDICINE (OUTPATIENT)
Dept: PERINATAL CARE | Facility: CLINIC | Age: 29
End: 2024-12-23
Payer: COMMERCIAL

## 2024-12-23 DIAGNOSIS — O99.810 ABNORMAL GLUCOSE TOLERANCE TEST (GTT) DURING PREGNANCY, ANTEPARTUM: Primary | ICD-10-CM

## 2024-12-23 DIAGNOSIS — Z71.89 ENCOUNTER FOR GLUCOMETER INSTRUCTION: ICD-10-CM

## 2024-12-23 DIAGNOSIS — Z34.02 PREGNANCY, SUPERVISION OF FIRST, SECOND TRIMESTER: ICD-10-CM

## 2024-12-23 DIAGNOSIS — Z13.1 ENCOUNTER FOR SCREENING FOR DIABETES MELLITUS: ICD-10-CM

## 2024-12-23 DIAGNOSIS — Z3A.15 15 WEEKS GESTATION OF PREGNANCY: ICD-10-CM

## 2024-12-23 PROCEDURE — G0108 DIAB MANAGE TRN  PER INDIV: HCPCS

## 2024-12-23 NOTE — PROGRESS NOTES
"Meter Education  (virtual visit)    Thank you for referring your patient to [facility] Maternal Fetal Medicine Diabetes and Pregnancy Program.     Maribel Morelos is a  29 y.o. female who presents today alone for Meter Education.    Patient is at 15w1d gestation, Estimated Date of Delivery: 6/15/25.     Reviewed and updated the following from patients medical record: PMH, Problem List, Allergies, and Current Medications.    Visit Diagnosis:  Encounter Diagnosis     ICD-10-CM    1. Abnormal glucose tolerance test (GTT) during pregnancy, antepartum  O99.810 Ambulatory Referral to Maternal Fetal Medicine      2. 15 weeks gestation of pregnancy  Z3A.15 Ambulatory Referral to Maternal Fetal Medicine      3. Pregnancy, supervision of first, second trimester  Z34.02 Ambulatory Referral to Maternal Fetal Medicine      4. Encounter for glucometer instruction  Z71.89       5. Encounter for screening for diabetes mellitus  Z13.1          Reason for meter education: Abnormal 1hr GTT, Unable to Tolerate 3hr GTT, and 1 Week of SMBG to Rule Out GDM    Labs  GDM LABS:  1 Hour GTT:   Lab Results   Component Value Date/Time    [id number] 151 (H) 12/03/2024 05:30 PM      3 Hour GTT:   No results found for: \"GLUF\", \"TJNUUFX9QH\", \"CNEUAHV7BH\", \"DBIHKSA3YG\"     A1C:  Lab Results   Component Value Date/Time    HGBA1C 5.3 04/03/2024 06:38 AM      Labs Ordered This Visit: None    Current Medications  Current Outpatient Medications   Medication Instructions    aspirin (ECOTRIN LOW STRENGTH) 162 mg, Oral, Daily, Stop taking at 36 weeks of pregnancy.    ketoconazole (NIZORAL) 2 % shampoo Daily for 2 weeks then once a month. Apply on damp skin, leave on for 5mins then rinse.    Prenatal Vit-Fe Fumarate-FA (PRENATAL PO) 1 tablet, Daily      Anthropometrics:  Ht Readings from Last 1 Encounters:   12/02/24 5' 5\" (1.651 m)      Wt Readings from Last 3 Encounters:   12/04/24 97.5 kg (215 lb)   12/02/24 97.4 kg (214 lb 12.8 oz)   11/27/24 " 97.3 kg (214 lb 6.4 oz)      Pre-Gravid Wt Pre-Gravid BMI TWG   97.1 kg (214 lb) 35.61 0.454 kg (1 lb)     Total Pregnancy Weight Gain Recommendations: BMI (> 30) 11-20 lbs  Current Wt Status Compared to Recommendations: Within Range -- Continue recommended rate of weight gain based on pre-pregnancy BMI till delivery    Most Recent Ultrasound Results:  Findings: NML Growth/DESEAN  Next US date: Scheduled Appropriately    Blood Glucose Monitoring:     Glucose Meter: OneTouch Verio Flex  *orders for supplies (meter, lancets, strips) based on patients needs pended/routed to appropriate provider after today's visit for review/approval     Instructed on Testing Blood Sugars: 4 x per day (Fasting, 2 hour after start of each meal)  Goals: (Fasting) 60-95mg/dl // (1hr PP) <140mg/dl // (2hr PP) <120mg/dl  Meter Teaching: Gave instruction on site selection, skin preparation, loading strips and lancet device, meter activation, obtaining blood sample, test strip and lancet disposal and storage, and recording log book entries.   Blood Sugar Tested in Office? No (Virtual Visit)  Instructed to write down blood sugars and report blood sugars after 1 week via [employer] message to the OB to determine possible GDM Dx     Date to Report Blood Sugars: 1 Week   *Pt was instructed to report blood glucose log to OB to determine possible GDM dx via [employer] Message    Follow-Up: Return in about 1 week (around 2024) for OB to Review SMBG x1wk to Rule Out GDM.     Begin Time: 1:00pm  End Time: 1:30pm    It was a pleasure working with them today. Please feel free to call with any questions or concerns.    Anuradha Padgett RD   Diabetes Educator  [facility] Maternal Fetal Medicine  Diabetes and Pregnancy Program  [address], [address]  [place] [address]    Virtual Regular Visit  Name: Maribel Morelos      : 1995      MRN: [id number]  Encounter Provider: Anuradha Padgett RD  Encounter Date: 2024   [facility]      Verification of patient location:  Patient is located at Home in the following state in which I hold an active license [place] :  Assessment & Plan  Abnormal glucose tolerance test (GTT) during pregnancy, antepartum    Orders:    Ambulatory Referral to Maternal Fetal Medicine    15 weeks gestation of pregnancy    Orders:    Ambulatory Referral to Maternal Fetal Medicine    Pregnancy, supervision of first, second trimester    Orders:    Ambulatory Referral to Maternal Fetal Medicine    Encounter for glucometer instruction         Encounter for screening for diabetes mellitus         Abnormal glucose tolerance test (GTT) during pregnancy, antepartum         15 weeks gestation of pregnancy         Pregnancy, supervision of first, second trimester         Encounter for glucometer instruction         Encounter for screening for diabetes mellitus               Encounter provider Anuradha Padgett RD    The patient was identified by name and date of birth. Maribel Morelos was informed that this is a telemedicine visit and that the visit is being conducted through the Epic Embedded platform. She agrees to proceed..  My office door was closed. No one else was in the room.  She acknowledged consent and understanding of privacy and security of the video platform. The patient has agreed to participate and understands they can discontinue the visit at any time.    Patient is aware this is a billable service.     History of Present Illness     HPI  Review of Systems    Objective   LMP 2024 (Exact Date)     Physical Exam    Visit Time  Total Visit Duration: 30min

## 2024-12-23 NOTE — PATIENT INSTRUCTIONS
Meter Education  Diabetes and Pregnancy Program   [facility] - Maternal Fetal Medicine    Diabetes Team:   DUSTIN Whitten RD CDE MS Brianna (Judy) (Marichuy) JOCELYN Padgett MS, RN    Purpose of Testing: Rule out gestational diabetes  Report blood sugars in 1 week to your OBGYN for review and final determination of GDM.   If you are gestational diabetic, your OBGYN will place a referral for our program.   Continue to check your blood sugars until otherwise instructed by your OBGYN and/or diabetes team.     Prescription for Glucometer, Testing Strips, and Lancets:  A request for a glucose meter, test strips and lancets was sent to the provider for approval.   Once your prescriptions are approved by the provider, your prescription will be sent electronically to your pharmacy.   Be mindful the provider is seeing patients in the office today so allow ample time for your prescriptions to be approved.   Call your pharmacy to verify your medication was received electronically and is ready for pick-up before going to pharmacy.   If you have any issues with coverage or your meter is unavailable, please reach out to our office at [phone].     CHECKING BLOOD SUGARS    Always carry your meter and testing supplies with you at all times.     How to Check Blood Sugars:  Wash your hands with soap and water or use waterless  to clean your hands.  Alcohol can dry your fingers and is not recommended.  Alcohol can also cause false, elevated glucose readings.  AVOID scented soaps and hand sanitizers - scented soaps may include sugar which can cause inaccurate/elevated readings   Gather your glucose meter kit and supplies.  Prepare your meter by inserting a new test strip.   This will automatically turn on your meter  Make sure test strips are not .  Use a new test strip each time.   Prepare your lancing device by inserting the lancet               After the lancet is  "securely in place, twist off the top to reveal needle.   Reattach lancing device top   Once lancing device top is reattached, you are now ready to prick the side of your fingertip to get a blood sample.  You can adjust the depth setting as needed. We recommend to start at \"4\".   Apply the blood sample to test strip according to instructions.   Make sure your sharp container is: heavy duty plastic, puncture-resistant lid, upright and stable, leak resistant, properly labeled.   Record your blood sugar     Additional References and Video: https://www.[employer]/products/glucose-meters/onetouch-verio-flex    Frequency: 4 Times Daily     Timing:   Fasting   Test when you wake up before you have anything to eat or drink  At least 8hrs but no more than 10hrs from your bedtime snack  Exceeding 10hrs may result in inaccurate readings  2 hrs after the first bite of your meal (breakfast, lunch, dinner) **do not test for snacks    Goals:    Fasting  60-95   2 Hours   After Meals <120     REPORTING BLOOD SUGARS:   Report blood sugars in 1 week to your OBGYN for review and final determination of GDM.   Keep written log of blood sugars:     Date Fasting After Breakfast After Lunch After Dinner                                                       If you are gestational diabetic, your OBGYN will place a referral for our program (Diabetes and Pregnancy Program).   Continue to check your blood sugars until otherwise instructed by your OBGYN and/or diabetes team.     Any questions give us a call at [phone] or send us a MyChart message to DUSTIN Whitten.     Anuradha Padgett RD   Diabetes Educator   The Diabetes and Pregnancy Program  At [facility] - Maternal Fetal Medicine   "

## 2024-12-24 RX ORDER — LANCETS 33 GAUGE
EACH MISCELLANEOUS
Qty: 100 EACH | Refills: 0 | Status: SHIPPED | OUTPATIENT
Start: 2024-12-24 | End: 2025-06-15

## 2024-12-24 RX ORDER — BLOOD-GLUCOSE METER
EACH MISCELLANEOUS
Qty: 1 KIT | Refills: 0 | Status: SHIPPED | OUTPATIENT
Start: 2024-12-24 | End: 2024-12-27

## 2024-12-24 RX ORDER — BLOOD SUGAR DIAGNOSTIC
STRIP MISCELLANEOUS
Qty: 100 STRIP | Refills: 0 | Status: SHIPPED | OUTPATIENT
Start: 2024-12-24 | End: 2025-06-15

## 2024-12-27 ENCOUNTER — ROUTINE PRENATAL (OUTPATIENT)
Age: 29
End: 2024-12-27
Payer: COMMERCIAL

## 2024-12-27 VITALS — DIASTOLIC BLOOD PRESSURE: 92 MMHG | BODY MASS INDEX: 35.68 KG/M2 | WEIGHT: 214.4 LBS | SYSTOLIC BLOOD PRESSURE: 130 MMHG

## 2024-12-27 DIAGNOSIS — B37.2 SKIN YEAST INFECTION: ICD-10-CM

## 2024-12-27 DIAGNOSIS — Z36.9 ENCOUNTER FOR ANTENATAL SCREENING: ICD-10-CM

## 2024-12-27 DIAGNOSIS — Z34.02 PREGNANCY, SUPERVISION OF FIRST, SECOND TRIMESTER: Primary | ICD-10-CM

## 2024-12-27 DIAGNOSIS — Z33.1 INCIDENTAL PREGNANCY: ICD-10-CM

## 2024-12-27 LAB
SL AMB  POCT GLUCOSE, UA: NORMAL
SL AMB POCT URINE PROTEIN: NORMAL

## 2024-12-27 PROCEDURE — 81002 URINALYSIS NONAUTO W/O SCOPE: CPT | Performed by: OBSTETRICS & GYNECOLOGY

## 2024-12-27 PROCEDURE — PNV: Performed by: OBSTETRICS & GYNECOLOGY

## 2024-12-27 RX ORDER — NYSTATIN 100000 U/G
OINTMENT TOPICAL 2 TIMES DAILY
Qty: 60 G | Refills: 3 | Status: SHIPPED | OUTPATIENT
Start: 2024-12-27

## 2024-12-27 NOTE — PROGRESS NOTES
Pt does report some cramping occa.  Denies VB or LOF.  No quickening as yet.      For AFP.  Imani u/s 2/3/25.     Rh neg:  for RhoGAM in pregnancy.      BMI->35:  continue 162mg ASA daily until 36wks.      Flu shot recommended.      Skin fungal infx:  continue on Nizoral shampoo and add Nystatin ointment.      PTL, wt gain, VB reviewed.

## 2024-12-30 LAB
CFTR FULL MUT ANL BLD/T SEQ: NORMAL
CITATION REF LAB TEST: NORMAL
CLINICAL INFO: NORMAL
ETHNIC BACKGROUND STATED: NORMAL
GENE DIS ANL CARRIER INTERP-IMP: NORMAL
INDICATION: NORMAL
LAB DIRECTOR NAME PROVIDER: NORMAL
RECOMMENDATION PATIENT DOC-IMP: NORMAL
REF LAB TEST METHOD: NORMAL
SERVICE CMNT-IMP: NORMAL
SPECIMEN SOURCE: NORMAL

## 2025-01-09 ENCOUNTER — APPOINTMENT (OUTPATIENT)
Dept: LAB | Facility: CLINIC | Age: 30
End: 2025-01-09
Payer: COMMERCIAL

## 2025-01-09 DIAGNOSIS — Z36.9 ENCOUNTER FOR ANTENATAL SCREENING: ICD-10-CM

## 2025-01-09 DIAGNOSIS — Z33.1 INCIDENTAL PREGNANCY: ICD-10-CM

## 2025-01-09 DIAGNOSIS — Z34.02 PREGNANCY, SUPERVISION OF FIRST, SECOND TRIMESTER: ICD-10-CM

## 2025-01-09 PROCEDURE — 36415 COLL VENOUS BLD VENIPUNCTURE: CPT

## 2025-01-09 PROCEDURE — 82105 ALPHA-FETOPROTEIN SERUM: CPT

## 2025-01-15 LAB
2ND TRIMESTER 4 SCREEN SERPL-IMP: NORMAL
AFP ADJ MOM SERPL: 0.92
AFP INTERP AMN-IMP: NORMAL
AFP INTERP SERPL-IMP: NORMAL
AFP INTERP SERPL-IMP: NORMAL
AFP SERPL-MCNC: 31.1 NG/ML
AGE AT DELIVERY: 30.1 YR
GA METHOD: NORMAL
GA: 17.9 WEEKS
IDDM PATIENT QL: NO
MULTIPLE PREGNANCY: NO
NEURAL TUBE DEFECT RISK FETUS: NORMAL %

## 2025-01-20 ENCOUNTER — OFFICE VISIT (OUTPATIENT)
Dept: URGENT CARE | Facility: CLINIC | Age: 30
End: 2025-01-20
Payer: COMMERCIAL

## 2025-01-20 ENCOUNTER — TELEPHONE (OUTPATIENT)
Age: 30
End: 2025-01-20

## 2025-01-20 VITALS
DIASTOLIC BLOOD PRESSURE: 86 MMHG | RESPIRATION RATE: 18 BRPM | SYSTOLIC BLOOD PRESSURE: 126 MMHG | TEMPERATURE: 97.5 F | OXYGEN SATURATION: 99 % | HEIGHT: 64 IN | BODY MASS INDEX: 36.95 KG/M2 | WEIGHT: 216.4 LBS | HEART RATE: 124 BPM

## 2025-01-20 DIAGNOSIS — J06.9 VIRAL URI WITH COUGH: Primary | ICD-10-CM

## 2025-01-20 PROCEDURE — 99213 OFFICE O/P EST LOW 20 MIN: CPT

## 2025-01-20 NOTE — PROGRESS NOTES
[facility]        NAME: Maribel Morelos is a 29 y.o. female  : 1995    MRN: [id number]  DATE: 2025  TIME: 11:27 AM    Assessment and Plan   Viral URI with cough [J06.9]  1. Viral URI with cough              Patient Instructions     Refer to safe medications and pregnancy list provided for you today.    Follow-up with PCP / OB in 3-5 days.    Go to the ED for any worsening symptoms.     If tests are performed, our office will contact you with results only if changes need to made to the care plan discussed with you at the visit. You can review your full results on [facility] [place].      Chief Complaint     Chief Complaint   Patient presents with    Sinus Problem     Started Friday, sinus pressure, coughing up yellow mucus, nasal congestion, low-grade fever, concerned for sinus infection, and she's been taking antihistamine pills for relief          History of Present Illness       29-year-old female currently 19-weeks-gestation presenting with cold-like symptoms x 3 days. Patient reports congestion and cough. Cough is loose and productive of mucous. Chest occasionally feels tight after coughing. Reports Tmax 99.2. Patient states she has been taking guaifenesin and fexofenadine as well as doing salt water gargles and applying warm compresses.        Review of Systems   Review of Systems   Constitutional:  Negative for chills and fever.   HENT:  Positive for congestion, sinus pressure and sore throat. Negative for ear pain.    Eyes:  Negative for discharge and redness.   Respiratory:  Positive for cough and chest tightness. Negative for shortness of breath and wheezing.    Cardiovascular:  Negative for chest pain and palpitations.   Gastrointestinal:  Negative for abdominal pain, diarrhea, nausea and vomiting.   Skin:  Negative for rash.   Neurological:  Negative for dizziness, light-headedness and headaches.         Current Medications       Current Outpatient Medications:      aspirin (ECOTRIN LOW STRENGTH) 81 mg EC tablet, Take 2 tablets (162 mg total) by mouth daily Stop taking at 36 weeks of pregnancy., Disp: 180 tablet, Rfl: 2    ketoconazole (NIZORAL) 2 % shampoo, Daily for 2 weeks then once a month. Apply on damp skin, leave on for 5mins then rinse., Disp: 120 mL, Rfl: 3    nystatin (MYCOSTATIN) ointment, Apply topically 2 (two) times a day, Disp: 60 g, Rfl: 3    OneTouch [employer]ica Lancets 33G MISC, Use 4 a Day or as instructed (Patient not taking: Reported on 12/27/2024), Disp: 100 each, Rfl: 0    OneTouch Verio test strip, Test 4 Times Daily or as instructed (Patient not taking: Reported on 12/27/2024), Disp: 100 strip, Rfl: 0    Prenatal Vit-Fe Fumarate-FA (PRENATAL PO), Take 1 tablet by mouth in the morning, Disp: , Rfl:     Current Allergies     Allergies as of 01/20/2025 - Reviewed 01/20/2025   Allergen Reaction Noted    Dexamethasone Rash 07/15/2024    Penicillins Rash 03/11/2013            The following portions of the patient's history were reviewed and updated as appropriate: allergies, current medications, past family history, past medical history, past social history, past surgical history and problem list.     Past Medical History:   Diagnosis Date    Allergic     Anxiety     never medicated    Headache(784.0)     Migraine     Palpitations     seen by cardiologist - holter monitor negative    Polycystic ovary syndrome     dx 2024    Strep throat 08/11/2021    Urinary tract infection     Varicella     had vaccine       Past Surgical History:   Procedure Laterality Date    FOOT SURGERY Left     Bunion       Family History   Problem Relation Age of Onset    Anxiety disorder Mother     Depression Mother     OCD Mother     Diabetes Father     No Known Problems Sister     Other (Other) Maternal Grandmother         low sodium    Hypertension Maternal Grandfather     Lung cancer Paternal Grandmother     Thyroid disease Paternal Grandfather     Cancer Paternal Grandfather      "Leukemia Paternal Grandfather     Diabetes Paternal Grandfather          Medications have been verified.        Objective   /86   Pulse (!) 124   Temp 97.5 °F (36.4 °C) (Tympanic)   Resp 18   Ht 5' 4\" (1.626 m)   Wt 98.2 kg (216 lb 6.4 oz)   LMP 08/04/2024 (Exact Date)   SpO2 99%   BMI 37.14 kg/m²        Physical Exam     Physical Exam  Vitals and nursing note reviewed.   Constitutional:       General: She is not in acute distress.     Appearance: She is not ill-appearing, toxic-appearing or diaphoretic.   HENT:      Head: Normocephalic and atraumatic.      Right Ear: Tympanic membrane, ear canal and external ear normal.      Left Ear: Tympanic membrane, ear canal and external ear normal.      Nose: Congestion present.      Mouth/Throat:      Mouth: Mucous membranes are moist.      Pharynx: Oropharynx is clear. No oropharyngeal exudate or posterior oropharyngeal erythema.   Eyes:      Conjunctiva/sclera: Conjunctivae normal.   Cardiovascular:      Rate and Rhythm: Regular rhythm. Tachycardia present.      Pulses: Normal pulses.      Heart sounds: Normal heart sounds.   Pulmonary:      Effort: Pulmonary effort is normal.      Breath sounds: Normal breath sounds. No wheezing, rhonchi or rales.   Musculoskeletal:         General: Normal range of motion.      Cervical back: Normal range of motion and neck supple.   Lymphadenopathy:      Cervical: No cervical adenopathy.   Skin:     General: Skin is warm and dry.      Capillary Refill: Capillary refill takes less than 2 seconds.   Neurological:      Mental Status: She is alert and oriented to person, place, and time.                   "

## 2025-01-20 NOTE — LETTER
January 20, 2025     Patient: Maribel Morelos   YOB: 1995   Date of Visit: 1/20/2025       To Whom it May Concern:    Maribel Morelos was seen in my clinic on 1/20/2025. She may return to work on 01/23/2025 .    If you have any questions or concerns, please don't hesitate to call.         Sincerely,          DUSTIN James        CC: No Recipients

## 2025-01-20 NOTE — TELEPHONE ENCOUNTER
2ND TRIMESTER CHECK-IN CALL     Overall how are you doing? Patient reports she is doing well overall, but notes has been dealing w/ cold s/s, was seen in urgent care today.  Reviewed medications safe to take in pregnancy, encouraged to increase fluids, verbalized understanding    Compliant with routine OB care appointments? Yes    Have you completed your 1st trimester labs? Yes    If you had NIPS with MFM, do you have a order for MSAFP? MSAFP has been completed.   Can be completed 15w-22w6d, ideally 16w-18w    Have you seen MFM and do you have your detailed US scheduled? Yes scheduled 2/3/25    Pregnancy Education-have you had a chance to review the classes offered and registered? No, reviewed prenatal classes and instructions for registering with patient.     Additional Notes: offers no other c/o

## 2025-01-20 NOTE — PATIENT INSTRUCTIONS
Refer to safe medications and pregnancy list provided for you today.    Follow-up with PCP / OB in 3-5 days.    Go to the ED for any worsening symptoms.

## 2025-01-22 ENCOUNTER — ROUTINE PRENATAL (OUTPATIENT)
Age: 30
End: 2025-01-22
Payer: COMMERCIAL

## 2025-01-22 VITALS — DIASTOLIC BLOOD PRESSURE: 72 MMHG | BODY MASS INDEX: 37.66 KG/M2 | WEIGHT: 219.4 LBS | SYSTOLIC BLOOD PRESSURE: 108 MMHG

## 2025-01-22 DIAGNOSIS — Z34.02 ENCOUNTER FOR SUPERVISION OF NORMAL FIRST PREGNANCY IN SECOND TRIMESTER: Primary | ICD-10-CM

## 2025-01-22 LAB
SL AMB  POCT GLUCOSE, UA: NEGATIVE
SL AMB POCT URINE PROTEIN: NEGATIVE

## 2025-01-22 PROCEDURE — PNV: Performed by: OBSTETRICS & GYNECOLOGY

## 2025-01-22 PROCEDURE — 81002 URINALYSIS NONAUTO W/O SCOPE: CPT | Performed by: OBSTETRICS & GYNECOLOGY

## 2025-01-22 NOTE — PROGRESS NOTES
PN visit    19w3d  Prior notes  AFP Negative  Level II scheduled 2/3/25  Finished checking the sugars. Would like to know if she should continue or ok to stop    Unsure if having fetal movement yet

## 2025-01-22 NOTE — PROGRESS NOTES
Routine PN visit.   MSAFP low risk.   Level II US scheduled.     Failed 1 hour glucola/did not tolerate 3hr --> did 1 week accuchecks, vast majority WNL.  Will plan to restest for a week at 26wks.     Maybe starting to feel an occasional flutter.  No bleeding, LOF.   Recent URI.

## 2025-02-03 ENCOUNTER — ROUTINE PRENATAL (OUTPATIENT)
Dept: PERINATAL CARE | Facility: OTHER | Age: 30
End: 2025-02-03
Payer: COMMERCIAL

## 2025-02-03 VITALS
WEIGHT: 223.2 LBS | BODY MASS INDEX: 38.1 KG/M2 | HEIGHT: 64 IN | DIASTOLIC BLOOD PRESSURE: 68 MMHG | HEART RATE: 111 BPM | SYSTOLIC BLOOD PRESSURE: 106 MMHG

## 2025-02-03 DIAGNOSIS — Z36.86 ENCOUNTER FOR ANTENATAL SCREENING FOR CERVICAL LENGTH: ICD-10-CM

## 2025-02-03 DIAGNOSIS — O99.210 OTHER OBESITY DUE TO EXCESS CALORIES AFFECTING PREGNANCY, ANTEPARTUM: ICD-10-CM

## 2025-02-03 DIAGNOSIS — Z3A.21 21 WEEKS GESTATION OF PREGNANCY: Primary | ICD-10-CM

## 2025-02-03 DIAGNOSIS — E66.09 OTHER OBESITY DUE TO EXCESS CALORIES AFFECTING PREGNANCY, ANTEPARTUM: ICD-10-CM

## 2025-02-03 PROCEDURE — 76811 OB US DETAILED SNGL FETUS: CPT | Performed by: OBSTETRICS & GYNECOLOGY

## 2025-02-03 PROCEDURE — 99213 OFFICE O/P EST LOW 20 MIN: CPT | Performed by: OBSTETRICS & GYNECOLOGY

## 2025-02-03 PROCEDURE — 76817 TRANSVAGINAL US OBSTETRIC: CPT | Performed by: OBSTETRICS & GYNECOLOGY

## 2025-02-03 RX ORDER — BLOOD-GLUCOSE METER
KIT MISCELLANEOUS
COMMUNITY
Start: 2024-12-27

## 2025-02-03 NOTE — PROGRESS NOTES
Ultrasound Probe Disinfection    A transvaginal ultrasound was performed.   Prior to use, disinfection was performed with High Level Disinfection Process (Convo).  Probe serial number [id number] was used.    Veronica Montgomery  02/03/25  2:32 PM

## 2025-02-03 NOTE — PROGRESS NOTES
The patient was seen today for an ultrasound.  Please see ultrasound report (located under Ob Procedures) for additional details.   Thank you very much for allowing us to participate in the care of this very nice patient.  Should you have any questions, please do not hesitate to contact me.     Oziel Philippe MD FACOG  Attending Physician, Maternal-Fetal Medicine  [facility]

## 2025-02-21 ENCOUNTER — ROUTINE PRENATAL (OUTPATIENT)
Age: 30
End: 2025-02-21
Payer: COMMERCIAL

## 2025-02-21 VITALS — BODY MASS INDEX: 39.24 KG/M2 | SYSTOLIC BLOOD PRESSURE: 112 MMHG | DIASTOLIC BLOOD PRESSURE: 78 MMHG | WEIGHT: 228.6 LBS

## 2025-02-21 DIAGNOSIS — Z11.3 SCREENING FOR STD (SEXUALLY TRANSMITTED DISEASE): ICD-10-CM

## 2025-02-21 DIAGNOSIS — Z34.02 ENCOUNTER FOR SUPERVISION OF NORMAL FIRST PREGNANCY IN SECOND TRIMESTER: Primary | ICD-10-CM

## 2025-02-21 LAB
SL AMB  POCT GLUCOSE, UA: NEGATIVE
SL AMB POCT URINE PROTEIN: NEGATIVE

## 2025-02-21 PROCEDURE — 81002 URINALYSIS NONAUTO W/O SCOPE: CPT | Performed by: OBSTETRICS & GYNECOLOGY

## 2025-02-21 PROCEDURE — PNV: Performed by: OBSTETRICS & GYNECOLOGY

## 2025-02-21 NOTE — PROGRESS NOTES
PN visit    23w4d  28wk labs ordered today  Level II completed 2/3/25 - recommend growth scan in 10-12wks  Met with MFM for diabetic pathways. Would like to do 1 week glucose testing instead of 1hr glucose.      Denies loss of fluid, contractions or bleeding  +Fetal movement

## 2025-02-24 NOTE — PROGRESS NOTES
Routine PN visit.   28wk labs ordered, plans to complete 1 week of sugar testing again at 26 weeks instead of glucola/3 hour.  She has supplies and will report.   No bleeding, LOF, contractions.   Baby is moving well.   32wk growth scan scheduled.   Rhogam 28wks.

## 2025-03-04 ENCOUNTER — CLINICAL SUPPORT (OUTPATIENT)
Dept: POSTPARTUM | Facility: CLINIC | Age: 30
End: 2025-03-04

## 2025-03-04 DIAGNOSIS — Z32.2 ENCOUNTER FOR CHILDBIRTH INSTRUCTION: Primary | ICD-10-CM

## 2025-03-17 ENCOUNTER — ROUTINE PRENATAL (OUTPATIENT)
Age: 30
End: 2025-03-17
Payer: COMMERCIAL

## 2025-03-17 VITALS — DIASTOLIC BLOOD PRESSURE: 82 MMHG | SYSTOLIC BLOOD PRESSURE: 116 MMHG | WEIGHT: 230.8 LBS | BODY MASS INDEX: 39.62 KG/M2

## 2025-03-17 DIAGNOSIS — Z34.02 ENCOUNTER FOR SUPERVISION OF NORMAL FIRST PREGNANCY IN SECOND TRIMESTER: Primary | ICD-10-CM

## 2025-03-17 DIAGNOSIS — O26.812 FATIGUE DURING PREGNANCY IN SECOND TRIMESTER: ICD-10-CM

## 2025-03-17 LAB
SL AMB  POCT GLUCOSE, UA: NEGATIVE
SL AMB POCT URINE PROTEIN: NEGATIVE

## 2025-03-17 PROCEDURE — PNV: Performed by: OBSTETRICS & GYNECOLOGY

## 2025-03-17 PROCEDURE — 81002 URINALYSIS NONAUTO W/O SCOPE: CPT | Performed by: OBSTETRICS & GYNECOLOGY

## 2025-03-17 NOTE — PROGRESS NOTES
PN visit    27w1d  28wk labs not completed. Will be going tomorrow for labs  1 week of sugar testing at 26 weeks was not completed  32wk growth scan scheduled 4/16/25  Yellow folder given today    Denies loss of fluid, contractions or bleeding  +Fetal movement

## 2025-03-17 NOTE — PROGRESS NOTES
Routine PN visit.   Will obtain labs tomorrow after school as well as start checking her blood sugars tomorrow.   Follow up in 1 week for rhogam/to discuss labs.  Folder given, FKC reviewed.   No bleeding/LOF/contractions.   Very fatigued - likely normal for pregnancy, will check for anemia, also check TSH.

## 2025-03-18 ENCOUNTER — APPOINTMENT (OUTPATIENT)
Dept: LAB | Facility: CLINIC | Age: 30
End: 2025-03-18
Payer: COMMERCIAL

## 2025-03-18 DIAGNOSIS — Z34.02 ENCOUNTER FOR SUPERVISION OF NORMAL FIRST PREGNANCY IN SECOND TRIMESTER: ICD-10-CM

## 2025-03-18 DIAGNOSIS — O26.812 FATIGUE DURING PREGNANCY IN SECOND TRIMESTER: ICD-10-CM

## 2025-03-18 DIAGNOSIS — Z11.3 SCREENING FOR STD (SEXUALLY TRANSMITTED DISEASE): ICD-10-CM

## 2025-03-18 LAB
BASOPHILS # BLD AUTO: 0.04 THOUSANDS/ÂΜL (ref 0–0.1)
BASOPHILS NFR BLD AUTO: 0 % (ref 0–1)
EOSINOPHIL # BLD AUTO: 0.06 THOUSAND/ÂΜL (ref 0–0.61)
EOSINOPHIL NFR BLD AUTO: 0 % (ref 0–6)
ERYTHROCYTE [DISTWIDTH] IN BLOOD BY AUTOMATED COUNT: 13.2 % (ref 11.6–15.1)
HCT VFR BLD AUTO: 38.3 % (ref 34.8–46.1)
HGB BLD-MCNC: 12.7 G/DL (ref 11.5–15.4)
IMM GRANULOCYTES # BLD AUTO: 0.21 THOUSAND/UL (ref 0–0.2)
IMM GRANULOCYTES NFR BLD AUTO: 1 % (ref 0–2)
LYMPHOCYTES # BLD AUTO: 2.86 THOUSANDS/ÂΜL (ref 0.6–4.47)
LYMPHOCYTES NFR BLD AUTO: 17 % (ref 14–44)
MCH RBC QN AUTO: 29.5 PG (ref 26.8–34.3)
MCHC RBC AUTO-ENTMCNC: 33.2 G/DL (ref 31.4–37.4)
MCV RBC AUTO: 89 FL (ref 82–98)
MONOCYTES # BLD AUTO: 0.92 THOUSAND/ÂΜL (ref 0.17–1.22)
MONOCYTES NFR BLD AUTO: 5 % (ref 4–12)
NEUTROPHILS # BLD AUTO: 12.82 THOUSANDS/ÂΜL (ref 1.85–7.62)
NEUTS SEG NFR BLD AUTO: 77 % (ref 43–75)
NRBC BLD AUTO-RTO: 0 /100 WBCS
PLATELET # BLD AUTO: 308 THOUSANDS/UL (ref 149–390)
PMV BLD AUTO: 10.4 FL (ref 8.9–12.7)
RBC # BLD AUTO: 4.3 MILLION/UL (ref 3.81–5.12)
TSH SERPL DL<=0.05 MIU/L-ACNC: 2.55 UIU/ML (ref 0.45–4.5)
WBC # BLD AUTO: 16.91 THOUSAND/UL (ref 4.31–10.16)

## 2025-03-18 PROCEDURE — 84443 ASSAY THYROID STIM HORMONE: CPT

## 2025-03-18 PROCEDURE — 86780 TREPONEMA PALLIDUM: CPT

## 2025-03-18 PROCEDURE — 85025 COMPLETE CBC W/AUTO DIFF WBC: CPT

## 2025-03-18 PROCEDURE — 36415 COLL VENOUS BLD VENIPUNCTURE: CPT

## 2025-03-19 LAB — TREPONEMA PALLIDUM IGG+IGM AB [PRESENCE] IN SERUM OR PLASMA BY IMMUNOASSAY: NORMAL

## 2025-03-20 ENCOUNTER — RESULTS FOLLOW-UP (OUTPATIENT)
Age: 30
End: 2025-03-20

## 2025-03-22 ENCOUNTER — HOSPITAL ENCOUNTER (EMERGENCY)
Facility: HOSPITAL | Age: 30
Discharge: HOME | End: 2025-03-22
Attending: EMERGENCY MEDICINE | Admitting: EMERGENCY MEDICINE
Payer: COMMERCIAL

## 2025-03-22 VITALS
RESPIRATION RATE: 18 BRPM | OXYGEN SATURATION: 99 % | TEMPERATURE: 98.9 F | SYSTOLIC BLOOD PRESSURE: 136 MMHG | DIASTOLIC BLOOD PRESSURE: 84 MMHG | HEART RATE: 115 BPM

## 2025-03-22 DIAGNOSIS — Z34.90 PREGNANCY: ICD-10-CM

## 2025-03-22 DIAGNOSIS — R11.2 NAUSEA AND VOMITING: Primary | ICD-10-CM

## 2025-03-22 LAB
ALBUMIN SERPL BCG-MCNC: 3.7 G/DL (ref 3.5–5)
ALP SERPL-CCNC: 73 U/L (ref 34–104)
ALT SERPL W P-5'-P-CCNC: 12 U/L (ref 7–52)
ANION GAP SERPL CALCULATED.3IONS-SCNC: 8 MMOL/L (ref 4–13)
AST SERPL W P-5'-P-CCNC: 12 U/L (ref 13–39)
BASOPHILS # BLD AUTO: 0.04 THOUSANDS/ÂΜL (ref 0–0.1)
BASOPHILS NFR BLD AUTO: 0 % (ref 0–1)
BILIRUB SERPL-MCNC: 0.33 MG/DL (ref 0.2–1)
BILIRUB UR QL STRIP: NEGATIVE
BUN SERPL-MCNC: 4 MG/DL (ref 5–25)
CALCIUM SERPL-MCNC: 9 MG/DL (ref 8.4–10.2)
CHLORIDE SERPL-SCNC: 105 MMOL/L (ref 96–108)
CLARITY UR: CLEAR
CO2 SERPL-SCNC: 22 MMOL/L (ref 21–32)
COLOR UR: YELLOW
CREAT SERPL-MCNC: 0.49 MG/DL (ref 0.6–1.3)
EOSINOPHIL # BLD AUTO: 0.06 THOUSAND/ÂΜL (ref 0–0.61)
EOSINOPHIL NFR BLD AUTO: 0 % (ref 0–6)
ERYTHROCYTE [DISTWIDTH] IN BLOOD BY AUTOMATED COUNT: 12.6 % (ref 11.6–15.1)
FLUAV AG UPPER RESP QL IA.RAPID: NEGATIVE
FLUBV AG UPPER RESP QL IA.RAPID: NEGATIVE
GFR SERPL CREATININE-BSD FRML MDRD: 132 ML/MIN/1.73SQ M
GLUCOSE SERPL-MCNC: 110 MG/DL (ref 65–140)
GLUCOSE UR STRIP-MCNC: NEGATIVE MG/DL
HCT VFR BLD AUTO: 39.5 % (ref 34.8–46.1)
HGB BLD-MCNC: 13.2 G/DL (ref 11.5–15.4)
HGB UR QL STRIP.AUTO: NEGATIVE
IMM GRANULOCYTES # BLD AUTO: 0.18 THOUSAND/UL (ref 0–0.2)
IMM GRANULOCYTES NFR BLD AUTO: 1 % (ref 0–2)
KETONES UR STRIP-MCNC: NEGATIVE MG/DL
LEUKOCYTE ESTERASE UR QL STRIP: NEGATIVE
LIPASE SERPL-CCNC: 34 U/L (ref 11–82)
LYMPHOCYTES # BLD AUTO: 2.61 THOUSANDS/ÂΜL (ref 0.6–4.47)
LYMPHOCYTES NFR BLD AUTO: 18 % (ref 14–44)
MCH RBC QN AUTO: 29.9 PG (ref 26.8–34.3)
MCHC RBC AUTO-ENTMCNC: 33.4 G/DL (ref 31.4–37.4)
MCV RBC AUTO: 90 FL (ref 82–98)
MONOCYTES # BLD AUTO: 0.94 THOUSAND/ÂΜL (ref 0.17–1.22)
MONOCYTES NFR BLD AUTO: 6 % (ref 4–12)
NEUTROPHILS # BLD AUTO: 10.99 THOUSANDS/ÂΜL (ref 1.85–7.62)
NEUTS SEG NFR BLD AUTO: 75 % (ref 43–75)
NITRITE UR QL STRIP: NEGATIVE
NRBC BLD AUTO-RTO: 0 /100 WBCS
PH UR STRIP.AUTO: 6 [PH]
PLATELET # BLD AUTO: 295 THOUSANDS/UL (ref 149–390)
PMV BLD AUTO: 9.9 FL (ref 8.9–12.7)
POTASSIUM SERPL-SCNC: 3.3 MMOL/L (ref 3.5–5.3)
PROT SERPL-MCNC: 6.8 G/DL (ref 6.4–8.4)
PROT UR STRIP-MCNC: NEGATIVE MG/DL
RBC # BLD AUTO: 4.41 MILLION/UL (ref 3.81–5.12)
SARS-COV+SARS-COV-2 AG RESP QL IA.RAPID: NEGATIVE
SODIUM SERPL-SCNC: 135 MMOL/L (ref 135–147)
SP GR UR STRIP.AUTO: 1.01 (ref 1–1.03)
UROBILINOGEN UR STRIP-ACNC: <2 MG/DL
WBC # BLD AUTO: 14.82 THOUSAND/UL (ref 4.31–10.16)

## 2025-03-22 PROCEDURE — 81003 URINALYSIS AUTO W/O SCOPE: CPT

## 2025-03-22 PROCEDURE — 99284 EMERGENCY DEPT VISIT MOD MDM: CPT | Performed by: EMERGENCY MEDICINE

## 2025-03-22 PROCEDURE — 85025 COMPLETE CBC W/AUTO DIFF WBC: CPT

## 2025-03-22 PROCEDURE — 99283 EMERGENCY DEPT VISIT LOW MDM: CPT

## 2025-03-22 PROCEDURE — 80053 COMPREHEN METABOLIC PANEL: CPT

## 2025-03-22 PROCEDURE — 87804 INFLUENZA ASSAY W/OPTIC: CPT | Performed by: EMERGENCY MEDICINE

## 2025-03-22 PROCEDURE — 36415 COLL VENOUS BLD VENIPUNCTURE: CPT

## 2025-03-22 PROCEDURE — 83690 ASSAY OF LIPASE: CPT

## 2025-03-22 PROCEDURE — 87086 URINE CULTURE/COLONY COUNT: CPT

## 2025-03-22 PROCEDURE — 87811 SARS-COV-2 COVID19 W/OPTIC: CPT | Performed by: EMERGENCY MEDICINE

## 2025-03-22 RX ORDER — ONDANSETRON 4 MG/1
4 TABLET, FILM COATED ORAL EVERY 6 HOURS
Qty: 12 TABLET | Refills: 0 | Status: SHIPPED | OUTPATIENT
Start: 2025-03-22 | End: 2025-04-02 | Stop reason: ALTCHOICE

## 2025-03-22 RX ORDER — ONDANSETRON 4 MG/1
4 TABLET, ORALLY DISINTEGRATING ORAL ONCE
Status: COMPLETED | OUTPATIENT
Start: 2025-03-22 | End: 2025-03-22

## 2025-03-22 RX ADMIN — ONDANSETRON 4 MG: 4 TABLET, ORALLY DISINTEGRATING ORAL at 15:23

## 2025-03-22 NOTE — ED PROVIDER NOTES
Time reflects when diagnosis was documented in both MDM as applicable and the Disposition within this note       Time User Action Codes Description Comment    3/22/2025  4:05 PM Warren Huizar Add [R11.2] Nausea and vomiting     3/22/2025  4:05 PM Warren Huizar [Z34.90] Pregnancy           ED Disposition       ED Disposition   Discharge    Condition   Stable    Date/Time   Sat Mar 22, 2025  4:05 PM    Comment   Maribel Morelos discharge to home/self care.                   Assessment & Plan       Medical Decision Making  Labs ordered from triage okay. Will check fetal heart tones, flu swab, symptomatic management with zofran, check UA for ketones and asymptomatic bacturia. Otherwise well appearing. Tolerated bagel for breakfast this AM. No abd pain on exam. Less likely to be  labor, no discharge or gush of fliud, less likely to be premature rupture of membranes.     Well-appearing 29-year-old female, UA without ketones or asymptomatic bacteriuria.  Viral testing was negative.  No fevers.  Tolerating oral intake in the ED as well as prior to arrival.  Feels better after Zofran.  Exam is benign no suspicion for underlying appendicitis or gallbladder disease at this time.  Will continue using Zofran as needed.  Has follow-up with OB in 2 to 3 days.  Will return to ED with any worsening symptoms.  Patient agreeable with plan.    Amount and/or Complexity of Data Reviewed  Labs: ordered.    Risk  Prescription drug management.        ED Course as of 25 1618   Sat Mar 22, 2025   1559 Fetal heart tones 142. Reassuring with exam.       Medications   ondansetron (ZOFRAN-ODT) dispersible tablet 4 mg (4 mg Oral Given 3/22/25 1523)       ED Risk Strat Scores                                                History of Present Illness       Chief Complaint   Patient presents with    Vomiting     Pt reports starting with upset stomach yesterday, then vomited, pt reports a headache. Today pt continues to  headache and nausea       Past Medical History:   Diagnosis Date    Allergic     Anxiety     never medicated    Headache(784.0)     Migraine     Palpitations     seen by cardiologist - holter monitor negative    Polycystic ovary syndrome     dx     Strep throat 2021    Urinary tract infection     Varicella     had vaccine      Past Surgical History:   Procedure Laterality Date    FOOT SURGERY Left     Bunion      Family History   Problem Relation Age of Onset    Anxiety disorder Mother     Depression Mother     OCD Mother     Diabetes Father     No Known Problems Sister     Other (Other) Maternal Grandmother         low sodium    Hypertension Maternal Grandfather     Lung cancer Paternal Grandmother     Thyroid disease Paternal Grandfather     Cancer Paternal Grandfather     Leukemia Paternal Grandfather     Diabetes Paternal Grandfather       Social History     Tobacco Use    Smoking status: Never     Passive exposure: Never    Smokeless tobacco: Never   Vaping Use    Vaping status: Never Used   Substance Use Topics    Alcohol use: Yes     Comment: socially    Drug use: No      E-Cigarette/Vaping    E-Cigarette Use Never User       E-Cigarette/Vaping Substances    Nicotine No     THC No     CBD No     Flavoring No     Other No     Unknown No       I have reviewed and agree with the history as documented.     29 year old female,  @ 28 weeks gestation. +Headache, nausea, one episode of vomiting that was NBNB yesterday. 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 Ate strawberries before beginning to feel off. No vaginal bleeding or discharge. No dysuria or frequency.      Vomiting  Associated symptoms: no abdominal pain, no chills, no cough, no diarrhea, no fever and no sore throat        Review of Systems   Constitutional: Negative.  Negative for chills and fever.   HENT: Negative.  Negative for rhinorrhea, sore throat, trouble swallowing and voice change.    Eyes: Negative.  Negative for pain and visual  disturbance.   Respiratory: Negative.  Negative for cough, shortness of breath and wheezing.    Cardiovascular: Negative.  Negative for chest pain and palpitations.   Gastrointestinal:  Positive for nausea and vomiting. Negative for abdominal pain and diarrhea.   Genitourinary: Negative.  Negative for dysuria and frequency.   Musculoskeletal: Negative.  Negative for neck pain and neck stiffness.   Skin: Negative.  Negative for rash.   Neurological: Negative.  Negative for dizziness, speech difficulty, weakness, light-headedness and numbness.           Objective       ED Triage Vitals   Temperature Pulse Blood Pressure Respirations SpO2 Patient Position - Orthostatic VS   03/22/25 1444 03/22/25 1444 03/22/25 1444 03/22/25 1444 03/22/25 1444 --   98.2 °F (36.8 °C) (!) 115 136/84 18 99 %       Temp Source Heart Rate Source BP Location FiO2 (%) Pain Score    03/22/25 1535 -- -- -- --    Oral          Vitals      Date and Time Temp Pulse SpO2 Resp BP Pain Score FACES Pain Rating User   03/22/25 1535 98.9 °F (37.2 °C) -- -- -- -- -- -- AY   03/22/25 1444 98.2 °F (36.8 °C) 115 99 % 18 136/84 -- -- LAURITA            Physical Exam  Vitals and nursing note reviewed.   Constitutional:       General: She is not in acute distress.     Appearance: She is well-developed.   HENT:      Head: Normocephalic and atraumatic.   Eyes:      Conjunctiva/sclera: Conjunctivae normal.      Pupils: Pupils are equal, round, and reactive to light.   Neck:      Trachea: No tracheal deviation.   Cardiovascular:      Rate and Rhythm: Normal rate and regular rhythm.   Pulmonary:      Effort: Pulmonary effort is normal. No respiratory distress.      Breath sounds: Normal breath sounds. No wheezing or rales.   Abdominal:      General: Bowel sounds are normal. There is no distension.      Palpations: Abdomen is soft.      Tenderness: There is no abdominal tenderness. There is no guarding or rebound.      Comments: Gravid abdomen. No TTP, No distention.    Musculoskeletal:         General: No tenderness or deformity. Normal range of motion.      Cervical back: Normal range of motion and neck supple.   Skin:     General: Skin is warm and dry.      Capillary Refill: Capillary refill takes less than 2 seconds.      Findings: No rash.   Neurological:      Mental Status: She is alert and oriented to person, place, and time.   Psychiatric:         Behavior: Behavior normal.         Results Reviewed       Procedure Component Value Units Date/Time    UA w Reflex to Microscopic w Reflex to Culture [[id number]] Collected: 03/22/25 1536    Lab Status: Final result Specimen: Urine, Clean Catch Updated: 03/22/25 1542     Color, UA Yellow     Clarity, UA Clear     Specific Gravity, UA 1.010     pH, UA 6.0     Leukocytes, UA Negative     Nitrite, UA Negative     Protein, UA Negative mg/dl      Glucose, UA Negative mg/dl      Ketones, UA Negative mg/dl      Urobilinogen, UA <2.0 mg/dl      Bilirubin, UA Negative     Occult Blood, UA Negative     URINE COMMENT --    Urine culture [[id number]] Collected: 03/22/25 1536    Lab Status: In process Specimen: Urine, Clean Catch Updated: 03/22/25 1542    FLU/COVID Rapid Antigen (30 min. TAT) - Preferred screening test in ED [[id number]]  (Normal) Collected: 03/22/25 1523    Lab Status: Final result Specimen: Nares from Nose Updated: 03/22/25 1542     SARS COV Rapid Antigen Negative     Influenza A Rapid Antigen Negative     Influenza B Rapid Antigen Negative    Narrative:      This test has been performed using the Quidel Rebecca 2 FLU+SARS Antigen test under the Emergency Use Authorization (EUA). This test has been validated by the  and verified by the performing laboratory. The Rebecca uses lateral flow immunofluorescent sandwich assay to detect SARS-COV, Influenza A and Influenza B Antigen.     The Quidel Rebecca 2 SARS Antigen test does not differentiate between SARS-CoV and SARS-CoV-2.     Negative results are presumptive and may be  confirmed with a molecular assay, if necessary, for patient management. Negative results do not rule out SARS-CoV-2 or influenza infection and should not be used as the sole basis for treatment or patient management decisions. A negative test result may occur if the level of antigen in a sample is below the limit of detection of this test.     Positive results are indicative of the presence of viral antigens, but do not rule out bacterial infection or co-infection with other viruses.     All test results should be used as an adjunct to clinical observations and other information available to the provider.    FOR PEDIATRIC PATIENTS - copy/paste COVID Guidelines URL to browser: [email or web address]    Comprehensive metabolic panel [[id number]]  (Abnormal) Collected: 03/22/25 1451    Lab Status: Final result Specimen: Blood from Arm, Right Updated: 03/22/25 1519     Sodium 135 mmol/L      Potassium 3.3 mmol/L      Chloride 105 mmol/L      CO2 22 mmol/L      ANION GAP 8 mmol/L      BUN 4 mg/dL      Creatinine 0.49 mg/dL      Glucose 110 mg/dL      Calcium 9.0 mg/dL      AST 12 U/L      ALT 12 U/L      Alkaline Phosphatase 73 U/L      Total Protein 6.8 g/dL      Albumin 3.7 g/dL      Total Bilirubin 0.33 mg/dL      eGFR 132 ml/min/1.73sq m     Narrative:      National Kidney Disease Foundation guidelines for Chronic Kidney Disease (CKD):     Stage 1 with normal or high GFR (GFR > 90 mL/min/1.73 square meters)    Stage 2 Mild CKD (GFR = 60-89 mL/min/1.73 square meters)    Stage 3A Moderate CKD (GFR = 45-59 mL/min/1.73 square meters)    Stage 3B Moderate CKD (GFR = 30-44 mL/min/1.73 square meters)    Stage 4 Severe CKD (GFR = 15-29 mL/min/1.73 square meters)    Stage 5 End Stage CKD (GFR <15 mL/min/1.73 square meters)  Note: GFR calculation is accurate only with a steady state creatinine    Lipase [[id number]]  (Normal) Collected: 03/22/25 1451    Lab Status: Final result  Specimen: Blood from Arm, Right Updated: 03/22/25 1519     Lipase 34 u/L     CBC and differential [[id number]]  (Abnormal) Collected: 03/22/25 1451    Lab Status: Final result Specimen: Blood from Arm, Right Updated: 03/22/25 1504     WBC 14.82 Thousand/uL      RBC 4.41 Million/uL      Hemoglobin 13.2 g/dL      Hematocrit 39.5 %      MCV 90 fL      MCH 29.9 pg      MCHC 33.4 g/dL      RDW 12.6 %      MPV 9.9 fL      Platelets 295 Thousands/uL      nRBC 0 /100 WBCs      Segmented % 75 %      Immature Grans % 1 %      Lymphocytes % 18 %      Monocytes % 6 %      Eosinophils Relative 0 %      Basophils Relative 0 %      Absolute Neutrophils 10.99 Thousands/µL      Absolute Immature Grans 0.18 Thousand/uL      Absolute Lymphocytes 2.61 Thousands/µL      Absolute Monocytes 0.94 Thousand/µL      Eosinophils Absolute 0.06 Thousand/µL      Basophils Absolute 0.04 Thousands/µL             No orders to display       Procedures    ED Medication and Procedure Management   Prior to Admission Medications   Prescriptions Last Dose Informant Patient Reported? Taking?   Blood Glucose Monitoring Suppl (OneTouch Verio Reflect) w/Device KIT   Yes No   Sig: Use as directed   Patient not taking: Reported on 2/3/2025   OneTouch Delica Lancets 33G MISC   No No   Sig: Use 4 a Day or as instructed   Patient not taking: Reported on 2/3/2025   OneTouch Verio test strip   No No   Sig: Test 4 Times Daily or as instructed   Patient not taking: Reported on 2/3/2025   Prenatal Vit-Fe Fumarate-FA (PRENATAL PO)  Self Yes No   Sig: Take 1 tablet by mouth in the morning   aspirin (ECOTRIN LOW STRENGTH) 81 mg EC tablet  Self No No   Sig: Take 2 tablets (162 mg total) by mouth daily Stop taking at 36 weeks of pregnancy.   ketoconazole (NIZORAL) 2 % shampoo  Self No No   Sig: Daily for 2 weeks then once a month. Apply on damp skin, leave on for 5mins then rinse.   Patient not taking: Reported on 2/3/2025   nystatin (MYCOSTATIN) ointment   No No   Sig:  Apply topically 2 (two) times a day   Patient not taking: Reported on 2/3/2025      Facility-Administered Medications: None     Discharge Medication List as of 3/22/2025  4:06 PM        START taking these medications    Details   ondansetron (ZOFRAN) 4 mg tablet Take 1 tablet (4 mg total) by mouth every 6 (six) hours, Starting Sat 3/22/2025, Normal           CONTINUE these medications which have NOT CHANGED    Details   aspirin (ECOTRIN LOW STRENGTH) 81 mg EC tablet Take 2 tablets (162 mg total) by mouth daily Stop taking at 36 weeks of pregnancy., Starting Mon 12/2/2024, Normal      Blood Glucose Monitoring Suppl (OneTouch Verio Reflect) w/Device KIT Use as directed, Starting Fri 12/27/2024, Historical Med      ketoconazole (NIZORAL) 2 % shampoo Daily for 2 weeks then once a month. Apply on damp skin, leave on for 5mins then rinse., Normal      nystatin (MYCOSTATIN) ointment Apply topically 2 (two) times a day, Starting Fri 12/27/2024, Normal      OneTouch Delica Lancets 33G MISC Use 4 a Day or as instructed, Normal      OneTouch Verio test strip Test 4 Times Daily or as instructed, Normal      Prenatal Vit-Fe Fumarate-FA (PRENATAL PO) Take 1 tablet by mouth in the morning, Historical Med           No discharge procedures on file.  ED SEPSIS DOCUMENTATION   Time reflects when diagnosis was documented in both MDM as applicable and the Disposition within this note       Time User Action Codes Description Comment    3/22/2025  4:05 PM Warren Huizar [R11.2] Nausea and vomiting     3/22/2025  4:05 PM Warren Huizar [Z34.90] Pregnancy                  Warren Huizar, DO  03/22/25 1617       Warren Huizar, DO  03/22/25 1618

## 2025-03-23 LAB — BACTERIA UR CULT: NORMAL

## 2025-03-24 ENCOUNTER — VBI (OUTPATIENT)
Dept: INTERNAL MEDICINE CLINIC | Facility: CLINIC | Age: 30
End: 2025-03-24

## 2025-03-24 NOTE — TELEPHONE ENCOUNTER
03/24/25 10:10 AM    Patient contacted post ED visit, first outreach attempt made. Message was left for patient to return a call to the VBI Department at [facility]: Phone [phone].    Thank you.  John Felix MA  [facility]

## 2025-03-26 ENCOUNTER — ROUTINE PRENATAL (OUTPATIENT)
Age: 30
End: 2025-03-26
Payer: COMMERCIAL

## 2025-03-26 VITALS — DIASTOLIC BLOOD PRESSURE: 86 MMHG | WEIGHT: 232.2 LBS | SYSTOLIC BLOOD PRESSURE: 118 MMHG | BODY MASS INDEX: 39.86 KG/M2

## 2025-03-26 DIAGNOSIS — Z34.02 ENCOUNTER FOR SUPERVISION OF NORMAL FIRST PREGNANCY IN SECOND TRIMESTER: Primary | ICD-10-CM

## 2025-03-26 DIAGNOSIS — Z29.13 NEED FOR RHOGAM DUE TO RH NEGATIVE MOTHER: ICD-10-CM

## 2025-03-26 LAB
SL AMB  POCT GLUCOSE, UA: NEGATIVE
SL AMB POCT URINE PROTEIN: ABNORMAL

## 2025-03-26 PROCEDURE — 96372 THER/PROPH/DIAG INJ SC/IM: CPT | Performed by: OBSTETRICS & GYNECOLOGY

## 2025-03-26 PROCEDURE — 81002 URINALYSIS NONAUTO W/O SCOPE: CPT | Performed by: OBSTETRICS & GYNECOLOGY

## 2025-03-26 PROCEDURE — PNV: Performed by: OBSTETRICS & GYNECOLOGY

## 2025-03-26 NOTE — PROGRESS NOTES
Routine PN visit.  Reviewed blood sugars - 2 elevated blood sugars (fasting), post meal are normal.  Will check a few more fastings and report on Monday.  28wk labs otherwise normal.   Rhogam today.  Delivery consent today.

## 2025-03-26 NOTE — PROGRESS NOTES
28w3d  Here for her Rhogam and to discuss labs  Delivery consent to be signed today  Sugars are uploaded in the chart from yesterday

## 2025-03-31 DIAGNOSIS — O24.410 DIET CONTROLLED GESTATIONAL DIABETES MELLITUS (GDM) IN THIRD TRIMESTER: Primary | ICD-10-CM

## 2025-04-02 ENCOUNTER — ROUTINE PRENATAL (OUTPATIENT)
Age: 30
End: 2025-04-02
Payer: COMMERCIAL

## 2025-04-02 VITALS — BODY MASS INDEX: 40.37 KG/M2 | SYSTOLIC BLOOD PRESSURE: 122 MMHG | DIASTOLIC BLOOD PRESSURE: 62 MMHG | WEIGHT: 235.2 LBS

## 2025-04-02 DIAGNOSIS — Z34.03 ENCOUNTER FOR SUPERVISION OF NORMAL FIRST PREGNANCY IN THIRD TRIMESTER: Primary | ICD-10-CM

## 2025-04-02 LAB
SL AMB  POCT GLUCOSE, UA: NORMAL
SL AMB POCT URINE PROTEIN: NORMAL

## 2025-04-02 PROCEDURE — 81002 URINALYSIS NONAUTO W/O SCOPE: CPT | Performed by: OBSTETRICS & GYNECOLOGY

## 2025-04-02 PROCEDURE — PNV: Performed by: OBSTETRICS & GYNECOLOGY

## 2025-04-02 NOTE — PROGRESS NOTES
Good FM  No cramping or VB  Had elevated blood sugars while monitoring (did instead of 3 hr GTT)  [facility] appointment tomorrow  Had Rhogam  TDAP probably next visit

## 2025-04-02 NOTE — PATIENT INSTRUCTIONS
CLASS 1   The Diabetes and Pregnancy Program   [facility] - Maternal Fetal Medicine    Diabetes Team  - DUSTIN Whitten CDE   - Anuradha (Chandan Padgett RD MS  - Aislinn Soria RD CDE MPH  - Delia Gilbert RDN, LDN    Contact  - Reply to a previous message from the diabetes team  - Send a message to Britney Ley  - Call [phone] (recommended if high priority)    Gestational Diabetes    CHECKING BLOOD SUGARS  Carry your meter and testing supplies with you at all times. Bring your glucose meter to all your appointments in our [facility] office.     Your prescription for a glucometer, test strips, and lancets  A request for a glucose meter, test strips and lancets was sent to the provider for approval. Once your prescriptions are approved by the provider, your prescription will be sent electronically to your pharmacy.     The provider is seeing patients in the office today so orders allow time for your prescriptions to be approved. We recommend calling your pharmacy to verify your medication was received electronically and is ready for pick-up before going to pharmacy.     Issues with insurance coverage?   Check your formulary on your insurance website or call the number on your insurance card to determine the preferred brand glucometer, testing strips, and lancets. Some examples of brands include One Touch, Contour, Accuchek, Freestyle [place]). Notify the diabetes team of your preferred brand and a new order will be placed.    Need to purchase over the counter?   We recommend either the [employer] ReliOn Prime or [facility] Advanced. Please notify the diabetes team if you will be using one of these meters.    Check out online coupons for testing strips:  - One Touch Verio: RX Finder  Automatic Savings for Diabetes Supplies  OneTouch®   - Contour Next: Contour® NEXT Test Strips Discounts  [facility]     How to Check Blood Sugars:  1) Wash your hands. We recommend unscented soap and water to avoid  "potentially inaccurate readings.   Alcohol can cause false elevated glucose readings if not fully dried and may dry your skin with continued use.  2) Gather your glucose meter kit and supplies.  3) Prepare your meter by inserting a new test strip. This will automatically turn on your meter. Use a new test strip each time. Avoid using  testing strips.  4) Prepare your lancing device by inserting the lancet. After the lancet is securely in place, twist off the top to reveal needle. Reattach lancing device top.  5) Once lancing device top is reattached, you are now ready to prick the side of your fingertip to get a blood sample. You can adjust the depth setting as needed. We recommend to start at \"4\".   6) Apply the blood sample to test strip according to instructions.   7) Properly dispose of your lancet. Use a new lancet every time. Make sure your sharp container is: heavy duty plastic, puncture-resistant lid, upright and stable, leak resistant, properly labeled.   8) Record your blood sugar      Instructions   One Touch Verio: OneTouch Verio Flex®  Blood Glucose Meter  OneTouch®   Contour Next: Instructional Videos - [facility]     Frequency: 4 Times Daily     Timing:   Fasting   Test when you wake up before you have anything to eat or drink  At least 8hrs but no more than 10hrs from your bedtime snack  Exceeding 10hrs may result in inaccurate readings  2 hrs after the first bite of your meal (breakfast, lunch, dinner) **do not test for snacks    Goals:    Fasting  60-95   1 Hour   After Meals <140   2 Hours   After Meals <120   *please inform member of diabetes team if you begin testing 1hr blood sugars    REPORTING BLOOD SUGARS:   Please only pick ONE way to report to our team. Choose the best option for you.     [employer] Blood Glucose Flow sheet under \"Track my Health\"   Remember to click \"save\" for your readings to automatically upload into Epic.   [employer] Message with " Attachment(s)  Send a picture of a written blood sugar log   To: DUSTIN Whitten (Medical Question - Non Urgent)  You may include up to 3 images per message  Leave Voicemail at [phone]   Include: Name, Date of Birth, and Date + Blood Sugars    The diabetes team will review your blood sugar log weekly till delivery.             MEAL PLAN AND DIET INSTRUCTIONS    *Carbohydrates: Sources of food that increase your blood sugar (include: starches, fruits, milk, desserts, starchy vegetables such as corn, peas, squash)    Meal Plan (3 Meals and 3 Snacks)  Outlines grams of carbohydrates to have at each meal and snack  Minimum Carbohydrate Intake Daily (avoid ketosis): 175g *to be distributed throughout day as instructed in meal plan  Include Protein at Every Meal and Snack  Eat all 3 meals and 3 snacks  Eating every 2 to 3.5 hours during the day.   Preferably at the same times every day.   Avoid going long periods of time without eating.        Be sure to have bedtime snack that includes at least 30 grams of carbohydrates and 2 ounces (14 grams) of protein.  Refer to Food Lists in Booklet (pages 15-17)   Each food listed is equal to 15g (1 Carbohydrate Serving)  Read Food Label   Check Total Carbohydrate  15g = 1 Carbohydrate Serving  Check Serving Size!   The total carbohydrate amount listed is based on 1 serving size  Be careful as many items contain more than one serving per package  Check Total Sugars  Choose items with <15g per serving of total sugar    Exercise:  If ok by your OB try adding a 20-30 minute walk after dinner to help keep fasting blood sugar within range.  Try incorporating at least 10 minutes of walking after each meal  Resource: Exercise During Pregnancy  ACOG     Additional Information: (to be reviewed at class 2)  Continue to follow up with your OB and MFM providers as recommended.  Always have glucose available for hypoglycemia, use 15 by 15 rule. (Page 29 in booklet)  While sick or feeling  ill, follow our sick day guidelines in our GDM booklet. (Page 28 in booklet)  For travel and dining out tips refer to your GDM booklet. See attachment (Page 31 in booklet)    Class 2 Information: Approximately 1 week following Class 1  Bring 3 Day Food Log (everything you eat and drink for each meal and snack) or write down meals associated with elevated after meal blood sugars  Will review diet more in depth and provide feedback     Remember: Importance of maintaining tight control of blood sugars during pregnancy = decrease risk factors including fetal macrosomia; birth injury; risk of ; polyhydramnios; pre-term labor; pre-eclampsia;  hypoglycemia; jaundice and stillbirth.    Any questions give us a call at [phone] or send us a [employer] message to DUSTIN Whitten.     Anuradha Padgett RD  Diabetes Educator   The Diabetes and Pregnancy Program  At [facility] - Maternal Fetal Medicine     Thank you for choosing us for your  care today.  If you have any questions about your ultrasound or care, please do not hesitate to contact us or your primary obstetrician.        Some general instructions for your pregnancy are:    Exercise: Aim for 150 minutes per week of regular exercise.  Walking is great!  Nutrition: Choose healthy sources of calcium, iron, and protein.  Avoid ultraprocessed foods and added sugar.  Learn about Preeclampsia: preeclampsia is a common, potentially serious high blood pressure complication in pregnancy.  A blood pressure of 140mmHg (systolic or top number) or 90mmHg (diastolic or bottom number) should be evaluated by your doctor.  Aspirin is sometimes prescribed in early pregnancy to prevent preeclampsia in women with risk factors - ask your obstetrician if you should be on this medication.  For more resources, visit:  [email or web address]  If you smoke, please try to quit completely but also try to reduce your smoking by  as much as possible (as soon as possible).  Do not vape.  Please also avoid cannabis products.  Other warning signs to watch out for in pregnancy or postpartum: chest pain, obstructed breathing or shortness of breath, seizures, thoughts of hurting yourself or your baby, bleeding, a painful or swollen leg, fever, or headache (see [facility] POST-BIRTH Warning Signs campaign).  If these happen call 911.  Itching is also not normal in pregnancy and if you experience this, especially over your hands and feet, potentially worse at night, notify your doctors.

## 2025-04-02 NOTE — PROGRESS NOTES
29w3d  Up to date Parth  Has appointment with [facility]  Delivery Consent Previously Signed  Received yellow folder   Plans to breastfeed- Will let us know which pump to order.    May want Tdap at next visit.    Denies Leaking of Fluid, vaginal bleeding, contractions  +Fetal Movement

## 2025-04-03 ENCOUNTER — TELEMEDICINE (OUTPATIENT)
Dept: PERINATAL CARE | Facility: CLINIC | Age: 30
End: 2025-04-03
Attending: OBSTETRICS & GYNECOLOGY
Payer: COMMERCIAL

## 2025-04-03 DIAGNOSIS — O24.410 DIET CONTROLLED GESTATIONAL DIABETES MELLITUS (GDM) IN THIRD TRIMESTER: Primary | ICD-10-CM

## 2025-04-03 DIAGNOSIS — Z3A.29 29 WEEKS GESTATION OF PREGNANCY: ICD-10-CM

## 2025-04-03 PROCEDURE — G0109 DIAB MANAGE TRN IND/GROUP: HCPCS

## 2025-04-03 RX ORDER — BLOOD SUGAR DIAGNOSTIC
STRIP MISCELLANEOUS
Qty: 100 STRIP | Refills: 5 | Status: SHIPPED | OUTPATIENT
Start: 2025-04-03 | End: 2025-06-15

## 2025-04-03 RX ORDER — LANCETS 33 GAUGE
EACH MISCELLANEOUS
Qty: 100 EACH | Refills: 5 | Status: SHIPPED | OUTPATIENT
Start: 2025-04-03 | End: 2025-06-15

## 2025-04-04 NOTE — PROGRESS NOTES
"CLASS 1   The Diabetes and Pregnancy Program  Group  virtual visit    Maribel Morelos is a 29 y.o. female who presents today unaccompanied for Virtual Regular Visit, Patient Education, and Gestational Diabetes Patient is at 29w2d gestation, Estimated Date of Delivery: 6/15/25.     Self-Assessment Questionnaire: completed via [employer]    Additional questionnaire responses reported throughout the following note. Reviewed and updated the following from patients medical record: Demographics, Education, Occupation, PMH, Allergies, and Current Medications.     Problem List Items Addressed This Visit    None  Visit Diagnoses         Diet controlled gestational diabetes mellitus (GDM) in third trimester    -  Primary    Relevant Orders    [employer] glucose flowsheet      29 weeks gestation of pregnancy        Relevant Orders    [employer] glucose flowsheet          Discussed:   - Pathophysiology of Diet controlled gestational diabetes mellitus (GDM) in third trimester [O24.410].  - Untreated hyperglycemia in pregnancy and maternal fetal complications (fetal macrosomia,  hypoglycemia, polyhydramnios, increased incidence of  section,  labor, and in severe cases fetal demise and still birth).   - Importance of blood glucose monitoring, nutrition, and medication if necessary in achieving BG goals.     LABS  Lab Results   Component Value Date/Time    [id number] 151 (H) 2024 05:30 PM      No results found for: \"GLUF\", \"MURCQRT6FP\", \"YHBZSSB4MO\", \"VNWGOAQ8ZV\"   Lab Results   Component Value Date/Time    HGBA1C 5.3 2024 06:38 AM        MEDICATIONS  Diabetic Medications: None    ANTHROPOMETRICS  Weight Gain in Pregnancy:  Current TWG (9.616 kg (21 lb 3.2 oz)) compared to recommended TWG (5 kg (11 lb)-9 kg (19 lb)): Exceeding -- Recommended to maintain wt for remainder of pregnancy    Pre-Pregnancy:   Pre-Gravid Wt: 97.1 kg (214 lb)  Pre-Gravid BMI: 36.72    Current:  Ht Readings from Last  " "Encounters:   02/03/25 5' 4\" (1.626 m)      Wt Readings from Last 3 Encounters:   04/02/25 107 kg (235 lb 3.2 oz)   03/26/25 105 kg (232 lb 3.2 oz)   03/17/25 105 kg (230 lb 12.8 oz)      Current BMI: There is no height or weight on file to calculate BMI.    RECENT ULTRASOUND RESULTS  Findings: NML Growth/DESEAN  See US note for additional details.   Next US:  Staff msg sent to clerical to schedule for sooner growth US d/t GDM dx    BLOOD GLUCOSE MONITORING  Ordered Glucometer, Testing Strips, and Lancets  Meter Teaching: Gave instruction on site selection, skin preparation, loading strips and lancet device, meter activation, obtaining blood sample, test strip and lancet disposal and storage, and recording log book entries.   - Frequency: 4 x per day (Fasting, 2 hour after start of each meal)  - Goals: (Fasting) 60-95mg/dL // (2hr PP) <120mg/dL  - Reporting: Weekly via [employer] Glucose Flowsheet     DIET   - Daily Calories/Carbohydrate/Protein: 2000 calorie (CHO: 45-15-60-15-60-30) (PRO: 2/3-1-3/4-1-3/4-2)   - Appropriate amounts of CHO, PRO, and Fat at each meal and snack.   - CHO exchange list, and portion sizes for both CHO and PRO  - How to read a food label  - Suggested meal/snack options to increase nutrition and maintain consistent meal and snack intakes  - Eat every 2.0-3.5 hours while awake  - Go no longer than 8-10 hours fasting overnight until first meal of the day.     PHYSICAL ACTIVITY  - Discussed benefits of physical activity to optimize blood glucose control, encouraged activity at patient is physically able.   - Instructed pt to always consult a physician prior to starting an exercise program.   - Recommend 20-30 minutes daily.     Patient Stated Goal: \"I will eat 3 meals and 3 snacks each day, including protein at each\"  Expected Compliance: good  Barriers to Learning/Change: No Barriers  Diabetes Self Management Support Plan (outside of ongoing care): Spouse/Family     Follow-up: Return in 15 days (on " 2025) for Class 2 w/ Aislinn Soria.   - Patient instructed to bring 3 day food diary and/or write down foods associated with elevated after meal blood sugars  - Call ([phone]) with any questions or concerns.    Start Time: 2:30pm  End Time: 3:30pm    Anurdaha Padgett RD   Diabetes Educator  [facility] Maternal Fetal Medicine  Diabetes and Pregnancy Program    Virtual Regular VisitName: Maribel Morelos      : 1995      MRN: [id number]  Encounter Provider: Anuradha Padgett RD  Encounter Date: 4/3/2025   Encounter department: [facility] [place]  :  Assessment & Plan  Diet controlled gestational diabetes mellitus (GDM) in third trimester    Lab Results   Component Value Date    HGBA1C 5.3 2024       Orders:    Ambulatory Referral to Maternal Fetal Medicine    [employer] glucose flowsheet    29 weeks gestation of pregnancy    Orders:    [employer] glucose flowsheet    Diet controlled gestational diabetes mellitus (GDM) in third trimester    Lab Results   Component Value Date    HGBA1C 5.3 2024          29 weeks gestation of pregnancy               History of Present Illness     HPI  Review of Systems    Objective   LMP 2024 (Exact Date)     Physical Exam    Administrative Statements   Encounter provider Anuradha Padgett RD    The Patient is located at Home and in the following state in which I hold an active license [place].    The patient was identified by name and date of birth. Maribel Morelos was informed that this is a telemedicine visit and that the visit is being conducted through the [employer] platform. She agrees to proceed..  My office door was closed. No one else was in the room.  She acknowledged consent and understanding of privacy and security of the video platform. The patient has agreed to participate and understands they can discontinue the visit at any time.    I have spent a total time of 60 minutes in caring for this patient on the day of the  visit/encounter

## 2025-04-05 ENCOUNTER — CLINICAL SUPPORT (OUTPATIENT)
Age: 30
End: 2025-04-05

## 2025-04-05 DIAGNOSIS — Z32.2 ENCOUNTER FOR CHILDBIRTH INSTRUCTION: Primary | ICD-10-CM

## 2025-04-07 ENCOUNTER — TELEPHONE (OUTPATIENT)
Dept: PERINATAL CARE | Facility: OTHER | Age: 30
End: 2025-04-07

## 2025-04-07 NOTE — TELEPHONE ENCOUNTER
Call attempted 4/7/2025 but phone is not accepting voicemail at this time. Sent message to patient asking patient to return MFM a call #[phone].    ----- Message from Anuradha HALE sent at 4/4/2025  1:02 PM EDT -----  Regarding: GDM; Schedule for Growth Ultrasound  The Diabetes and Pregnancy Program  Please call patient to schedule for sooner growth ultrasound d/t GDM. Seen yesterday by diabetes team.    ---    Gestational Diabetes Fetal Monitoring Recommendations: (unless otherwise stated by physician)    #Growth US every 4 weeks starting at 28 weeks.   #New patients should be seen for a growth US with MFM prior to being seen by the diabetes team  #If on insulin and/or metformin, must complete NST x2 weekly and DESEAN once weekly starting at 32 weeks (may receive this monitoring for other risk factors)    Anuradha Padgett MS RD  Diabetes Educator  The Diabetes and Pregnancy Program  [facility] Maternal Fetal Medicine[address], [address] [place] [id number]   Dept: [phone]  Dept Fax: [phone]

## 2025-04-07 NOTE — TELEPHONE ENCOUNTER
Patient returned  for sooner growth ultrasound.  Patient only wants to go to [facility].  Unable to find appointment.  Patient told a message would be sent for a sooner appointment.

## 2025-04-10 NOTE — TELEPHONE ENCOUNTER
04/10/25 12:37 PM    Patient contacted post ED visit, VBI phone outreaches documented. Patient called practice and scheduled a follow-up ED visit.  Seen 3-26-25    Thank you.  John Felix MA  [facility] VALUE BASED VIR

## 2025-04-12 ENCOUNTER — CLINICAL SUPPORT (OUTPATIENT)
Age: 30
End: 2025-04-12

## 2025-04-12 DIAGNOSIS — Z32.2 ENCOUNTER FOR CHILDBIRTH INSTRUCTION: Primary | ICD-10-CM

## 2025-04-14 ENCOUNTER — CLINICAL SUPPORT (OUTPATIENT)
Dept: POSTPARTUM | Facility: CLINIC | Age: 30
End: 2025-04-14

## 2025-04-14 DIAGNOSIS — Z32.2 ENCOUNTER FOR CHILDBIRTH INSTRUCTION: Primary | ICD-10-CM

## 2025-04-16 ENCOUNTER — ULTRASOUND (OUTPATIENT)
Dept: PERINATAL CARE | Facility: OTHER | Age: 30
End: 2025-04-16
Attending: OBSTETRICS & GYNECOLOGY
Payer: COMMERCIAL

## 2025-04-16 VITALS
HEART RATE: 100 BPM | BODY MASS INDEX: 40.15 KG/M2 | HEIGHT: 64 IN | SYSTOLIC BLOOD PRESSURE: 116 MMHG | DIASTOLIC BLOOD PRESSURE: 84 MMHG | WEIGHT: 235.2 LBS

## 2025-04-16 DIAGNOSIS — Z3A.31 31 WEEKS GESTATION OF PREGNANCY: ICD-10-CM

## 2025-04-16 DIAGNOSIS — O24.410 DIET CONTROLLED GESTATIONAL DIABETES MELLITUS (GDM) IN THIRD TRIMESTER: Primary | ICD-10-CM

## 2025-04-16 PROCEDURE — 99213 OFFICE O/P EST LOW 20 MIN: CPT | Performed by: OBSTETRICS & GYNECOLOGY

## 2025-04-16 PROCEDURE — 76816 OB US FOLLOW-UP PER FETUS: CPT | Performed by: OBSTETRICS & GYNECOLOGY

## 2025-04-16 NOTE — PROGRESS NOTES
The patient was seen today for an ultrasound.  Please see ultrasound report (located under Ob Procedures) for additional details.   Thank you very much for allowing us to participate in the care of this very nice patient.  Should you have any questions, please do not hesitate to contact me.     Oziel Philippe MD FACOG  Attending Physician, Maternal-Fetal Medicine  [facility]

## 2025-04-17 ENCOUNTER — TELEPHONE (OUTPATIENT)
Facility: HOSPITAL | Age: 30
End: 2025-04-17

## 2025-04-17 ENCOUNTER — ROUTINE PRENATAL (OUTPATIENT)
Age: 30
End: 2025-04-17
Payer: COMMERCIAL

## 2025-04-17 VITALS — WEIGHT: 232.8 LBS | BODY MASS INDEX: 39.96 KG/M2 | DIASTOLIC BLOOD PRESSURE: 84 MMHG | SYSTOLIC BLOOD PRESSURE: 136 MMHG

## 2025-04-17 DIAGNOSIS — Z34.03 ENCOUNTER FOR SUPERVISION OF NORMAL FIRST PREGNANCY IN THIRD TRIMESTER: Primary | ICD-10-CM

## 2025-04-17 DIAGNOSIS — Z23 NEED FOR TDAP VACCINATION: ICD-10-CM

## 2025-04-17 LAB
DME PARACHUTE DELIVERY DATE ACTUAL: NORMAL
DME PARACHUTE DELIVERY DATE REQUESTED: NORMAL
DME PARACHUTE ITEM DESCRIPTION: NORMAL
DME PARACHUTE ORDER STATUS: NORMAL
DME PARACHUTE SUPPLIER NAME: NORMAL
DME PARACHUTE SUPPLIER PHONE: NORMAL
SL AMB  POCT GLUCOSE, UA: 500
SL AMB POCT URINE PROTEIN: ABNORMAL

## 2025-04-17 PROCEDURE — 90471 IMMUNIZATION ADMIN: CPT | Performed by: OBSTETRICS & GYNECOLOGY

## 2025-04-17 PROCEDURE — 90715 TDAP VACCINE 7 YRS/> IM: CPT | Performed by: OBSTETRICS & GYNECOLOGY

## 2025-04-17 PROCEDURE — PNV: Performed by: OBSTETRICS & GYNECOLOGY

## 2025-04-17 PROCEDURE — 81002 URINALYSIS NONAUTO W/O SCOPE: CPT | Performed by: OBSTETRICS & GYNECOLOGY

## 2025-04-17 NOTE — TELEPHONE ENCOUNTER
Unable to reach patient by phone, left voicemail explaining our [facility] Maternal Fetal Medicine office has had a slight change in scheduling. We rescheduled her appointment to May 23 @ 9:15 a.m. at our [facility] Maternal [facility]etal [facility] [address]. If this is not convenient, please contact our office at [phone].    We do apologize for any inconvenience.

## 2025-04-17 NOTE — PROGRESS NOTES
Good FM  No VB or BH's  Having trouble doing blood sugars on correct schedule - encouraged patient to just do her best    Growth sono yesterday:    MEASUREMENTS (* Included In Average GA)     AC             27.39 cm        31 weeks 4 days* (48%)  BPD             8.21 cm        33 weeks 0 days* (84%)  HC             29.89 cm        33 weeks 1 day * (60%)  Femur           6.05 cm        31 weeks 4 days* (37%)     HC/AC           1.09 [0.96 - 1.17]                 (63%)  FL/AC             22 [20 - 24]  FL/BPD            74 [71 - 87]  EFW [place] 4   1830 grams - 4 lbs 1 oz                 (50%)    TDAP given today    RTO 2 weeks

## 2025-04-17 NOTE — PROGRESS NOTES
31w4d  Up to date Rhogam  Reporting sugars. Has been advised to send food logs.  Feels overwhelmed with testing. Having problems with fasting.   Delivery Consent Previously Signed    Plans to breastfeed- Pump ordered today with [employer]    TDAP given today    Denies Leaking of Fluid, vaginal bleeding, contractions  +Fetal Movement    EPDS: 4

## 2025-04-17 NOTE — PROGRESS NOTES
"CLASS 2 - Group  (virtual visit)    Thank you for referring your patient to [facility] Maternal Fetal Medicine Diabetes and Pregnancy Program.     Maribel Morelos is a  29 y.o. female who presents today unaccompanied for Patient Education (Class 2), Virtual Regular Visit, Gestational Diabetes (31w5d), and Virtual Regular Visit  Patient is at 31w4d gestation, Estimated Date of Delivery: 6/15/25.     Visit Diagnosis:  Encounter Diagnosis     ICD-10-CM    1. Diet controlled gestational diabetes mellitus (GDM) in third trimester  O24.410       2. 31 weeks gestation of pregnancy  Z3A.31            Reviewed and updated the following from patients medical record: PMH, Problem List, Allergies, and Current Medications.    Labs  GDM LABS: See Class 1 Note    A1C:  Lab Results   Component Value Date/Time    HGBA1C 5.3 04/03/2024 06:38 AM        Labs Ordered This Visit: None    Current Medications:    Current Outpatient Medications:     aspirin (ECOTRIN LOW STRENGTH) 81 mg EC tablet, Take 2 tablets (162 mg total) by mouth daily Stop taking at 36 weeks of pregnancy., Disp: 180 tablet, Rfl: 2    Blood Glucose Monitoring Suppl (OneTouch Verio Reflect) w/Device KIT, Use as directed, Disp: , Rfl:     ketoconazole (NIZORAL) 2 % shampoo, Daily for 2 weeks then once a month. Apply on damp skin, leave on for 5mins then rinse., Disp: 120 mL, Rfl: 3    nystatin (MYCOSTATIN) ointment, Apply topically 2 (two) times a day, Disp: 60 g, Rfl: 3    OneTouch Delica Lancets 33G MISC, Use 4 a Day or as instructed, Disp: 100 each, Rfl: 5    OneTouch Verio test strip, Test 4 Times Daily or as instructed, Disp: 100 strip, Rfl: 5    Prenatal Vit-Fe Fumarate-FA (PRENATAL PO), Take 1 tablet by mouth in the morning, Disp: , Rfl:      Anthropometrics:  Ht Readings from Last 1 Encounters:   04/16/25 5' 4\" (1.626 m)      Wt Readings from Last 3 Encounters:   04/17/25 106 kg (232 lb 12.8 oz)   04/16/25 107 kg (235 lb 3.2 oz)   04/02/25 107 kg (235 lb " 3.2 oz)        Pre-Gravid Wt Pre-Gravid BMI TWG   97.1 kg (214 lb) 36.72 8.528 kg (18 lb 12.8 oz)     Total Pregnancy Weight Gain Recommendations: 5 kg (11 lb)-9 kg (19 lb)   Current Wt Status Compared to Recommendations:Recommend patient maintain weight for remainder of pregnancy    Most Recent Ultrasound Results:  Findings: NML Growth/DESEAN - 25    BLOOD GLUCOSE MONITORING:   Timing/Frequency of SMB x per day (Fasting, 2 hour after start of each meal)  Goals: (Fasting) 60-95mg/dL // (2hr PP) <120mg/dL  Method of Reporting Blood Sugars: [employer] Glucose Flowsheet    BG LOG:           Review of Blood Glucose Log:   FBG =  1 elevation noted; overall well controlled  Post-Prandial BG =  Missing several post dinner readings ; Patient reports struggling to stay awake after dinner. Discussed option to switch to 1 hour of testing which patient agrees to starting 25 when she returns to work after spring break    MEAL PLAN   2000 calorie (CHO:45-15-60-15-60-30) (PRO: 2/3-1-3/4-1-3/4-2)    Review of Patient's Current Diet: refer to class 1 note for additional details - Recall limited due to group setting but patient reports sweet cravings and nausea/morning sickness; reports eating strawberries/grapes/chocolate covered pretzels + orange juice for breakfast. Educator discussed sugar free beverage alternatives including Hint or infused water (Patient reports issues with teeth decay in past to Crystal Light/Stur Water Enhancer). Guidelines on morning sickness and sweet alternatives (option for Boost Glucose Control) discussed and sent via [employer] message.    Beverages: Sugar Sweetened Beverages Including Juice      Overall Impression: Pt has a Good compliance and understanding of diet recommendations at this time.    Reinforced Diet Instructions:  Individualized meal plan.   Importance of consistent carbohydrate intake via 3 meals and 3 snacks per day   Importance of protein as it relates to blood glucose control.    Encouraged  patient to eat every 2.0-3.5 hours while awake  Encouraged patient to go no longer than 8-10 hours fasting overnight until first meal of the day.  Provided suggested meal/snack options to increase nutrition and maintain consistent meal and snack intakes.    Physical Activity:  Reviewed w/ Pt:   Benefits of physical activity to optimize blood glucose control, encouraged activity at patient is physically able.   Instructed pt to always consult a physician prior to starting an exercise program.   Recommend 20-30 minutes daily.    Additional Topics Reviewed:    Medications: (reviewed options available with pt)  Discussed if blood sugars are not within normal range with meal planning and exercise  Reviewed medication such as metformin and/or basal/bolus insulin may be needed for better glucose control  Maternal-Fetal Testing:   Ultrasounds: growth scans every 4 weeks.  If anti-diabetic medication started, pt should complete NST x2 weekly and DESEAN x 1 weekly starting at 32nd week GA unless otherwise instructed per [facility] physician.  Sick day Guidelines:   Advised that sickness will raise blood sugar   If blood sugar is > 160 mg/dL twice in one day call doctor  If on diabetes medications, continue as instructed   If unable to consume normal meal plan, instructed to remain well hydrated   Hypoglycemia & Treatment Guidelines:  Reviewed what hypoglycemia is, signs and symptoms, and how to treat via the 15:15 rule.  Post-Partum Guidelines:  Completion of 75 gm CHO 2 hr gtt at 6 weeks post-partum to check for Type 2 DM diagnosis  Breastfeeding Guidelines:  Continue GDM meal plan plus additional 350-500 calories daily  Examples of protein and carbohydrate snacks provided.  Stay hydrated by drinking 8-10 (8 oz.) fluids daily.  Dining Out & Travel Guidelines:  Patient advised to be prepared with extra diabetes supplies, medications, and snacks, as well as sticking to the same time schedule and portions eaten at home for  "meals and snacks.    Patient Stated Goal: \"I will eat 3 meals and 3 snacks each day, including protein at each\"  Goal Assessment: On track    Diabetes Self Management Support Plan outside of ongoing care: Spouse/Family    Barriers to Learning/Change:  Reported schedule for testing blood sugars  Expected Compliance: good    Date to report blood sugars: Weekly   Follow up:  Return in about 18 days (around 2025) for Review Diet, Review Blood Sugars.     Begin Time: 9:06 am  End Time: 10:13 am    It was a pleasure working with them today. Please feel free to call ([phone]) with any questions or concerns.    Aislinn Soria RD   Diabetes Educator  [facility] Maternal Fetal Medicine  Diabetes and Pregnancy Program    Virtual Regular VisitName: Maribel Morelos      : 1995      MRN: [id number]  Encounter Provider: Aislinn Soria RD  Encounter Date: 2025   Encounter department: [facility]  :  Assessment & Plan  Diet controlled gestational diabetes mellitus (GDM) in third trimester    Lab Results   Component Value Date    HGBA1C 5.3 2024            31 weeks gestation of pregnancy         Diet controlled gestational diabetes mellitus (GDM) in third trimester    Lab Results   Component Value Date    HGBA1C 5.3 2024          31 weeks gestation of pregnancy               History of Present Illness     HPI  Review of Systems    Objective   LMP 2024 (Exact Date)     Physical Exam    Administrative Statements   Encounter provider Aislinn Soria RD    The Patient is located at Home and in the following state in which I hold an active license [place].    The patient was identified by name and date of birth. Maribel Morelos was informed that this is a telemedicine visit and that the visit is being conducted through the [employer] platform. She agrees to proceed..  My office door was closed. No one else was in the room.  She acknowledged consent and understanding " of privacy and security of the video platform. The patient has agreed to participate and understands they can discontinue the visit at any time.    I have spent a total time of 75 minutes in caring for this patient on the day of the visit/encounter including Instructions for management, Patient and family education, Importance of tx compliance, Risk factor reductions, Counseling / Coordination of care, Documenting in the medical record, and Obtaining or reviewing history  , not including the time spent for establishing the audio/video connection.

## 2025-04-18 ENCOUNTER — TELEMEDICINE (OUTPATIENT)
Facility: HOSPITAL | Age: 30
End: 2025-04-18
Attending: OBSTETRICS & GYNECOLOGY
Payer: COMMERCIAL

## 2025-04-18 DIAGNOSIS — O24.410 DIET CONTROLLED GESTATIONAL DIABETES MELLITUS (GDM) IN THIRD TRIMESTER: Primary | ICD-10-CM

## 2025-04-18 DIAGNOSIS — Z3A.31 31 WEEKS GESTATION OF PREGNANCY: ICD-10-CM

## 2025-04-18 PROCEDURE — G0109 DIAB MANAGE TRN IND/GROUP: HCPCS | Performed by: DIETITIAN, REGISTERED

## 2025-04-21 ENCOUNTER — CLINICAL SUPPORT (OUTPATIENT)
Dept: POSTPARTUM | Facility: CLINIC | Age: 30
End: 2025-04-21

## 2025-04-21 DIAGNOSIS — Z32.2 ENCOUNTER FOR CHILDBIRTH INSTRUCTION: Primary | ICD-10-CM

## 2025-04-23 ENCOUNTER — CLINICAL SUPPORT (OUTPATIENT)
Dept: PEDIATRICS CLINIC | Facility: CLINIC | Age: 30
End: 2025-04-23

## 2025-04-23 DIAGNOSIS — Z76.81 EXPECTANT PARENT PREBIRTH PEDIATRICIAN VISIT: Primary | ICD-10-CM

## 2025-04-23 PROCEDURE — NA001 NO CHARGE AUDIO ONLY

## 2025-04-25 ENCOUNTER — TELEPHONE (OUTPATIENT)
Age: 30
End: 2025-04-25

## 2025-04-25 NOTE — TELEPHONE ENCOUNTER
Attempted to contact patient for 3rd Trimester Check-in Call. Pt unavailable. [employer] msg was sent  4/22/25.  Left msg to reach out w/questions or concerns.

## 2025-04-28 ENCOUNTER — CLINICAL SUPPORT (OUTPATIENT)
Age: 30
End: 2025-04-28

## 2025-04-28 DIAGNOSIS — Z32.2 ENCOUNTER FOR CHILDBIRTH INSTRUCTION: Primary | ICD-10-CM

## 2025-05-01 ENCOUNTER — DOCUMENTATION (OUTPATIENT)
Facility: HOSPITAL | Age: 30
End: 2025-05-01

## 2025-05-01 ENCOUNTER — ROUTINE PRENATAL (OUTPATIENT)
Age: 30
End: 2025-05-01
Payer: COMMERCIAL

## 2025-05-01 VITALS — DIASTOLIC BLOOD PRESSURE: 84 MMHG | SYSTOLIC BLOOD PRESSURE: 124 MMHG | BODY MASS INDEX: 40.68 KG/M2 | WEIGHT: 237 LBS

## 2025-05-01 DIAGNOSIS — Z3A.33 33 WEEKS GESTATION OF PREGNANCY: ICD-10-CM

## 2025-05-01 DIAGNOSIS — O24.410 DIET CONTROLLED GESTATIONAL DIABETES MELLITUS (GDM) IN THIRD TRIMESTER: Primary | ICD-10-CM

## 2025-05-01 DIAGNOSIS — Z34.03 ENCOUNTER FOR SUPERVISION OF NORMAL FIRST PREGNANCY IN THIRD TRIMESTER: Primary | ICD-10-CM

## 2025-05-01 LAB
SL AMB  POCT GLUCOSE, UA: NORMAL
SL AMB POCT URINE PROTEIN: 1

## 2025-05-01 PROCEDURE — PNV: Performed by: OBSTETRICS & GYNECOLOGY

## 2025-05-01 PROCEDURE — 81002 URINALYSIS NONAUTO W/O SCOPE: CPT | Performed by: OBSTETRICS & GYNECOLOGY

## 2025-05-01 NOTE — PROGRESS NOTES
Pt reports +FM.  Denies LOF, VB, but does have BH ctx.  C/o increase ankle swelling and carpal tunnel sxs.  Will get compression stocking and wrist braces.      +1 prot but was not able to get adequate fluids today due to testing at school.  Denies HA, vision changes or RUQ pain.      S/p RhoGAM.      PTL, RAMON, PreE sxs, wt gain, low salt diet reviewed.

## 2025-05-01 NOTE — PROGRESS NOTES
33w4d  Up to date Rhogam & Tdap  Reporting sugars?yes  Delivery Consent Previously Signed  Birth plan; forgot to bring it in  Plans to breastfeed; has her pump now  US for fetal growth scheduled 5/16/25  Denies Leaking of Fluid, vaginal bleeding, contractions; has cramping  +Fetal Movement

## 2025-05-01 NOTE — PROGRESS NOTES
"Blood Sugar Log  Method of Reporting: [employer] Glucose Flowsheet   Date: 25    Patient: Maribel Morelos  : 1995    Estimated Date of Delivery: 6/15/25  GA: 33w4d     Reason for Documentation: Gestational Diabetes (33w4d)     Patient  Message:        ASSESSMENT - REVIEW OF BG LOG     BG Log:          Assessment:  FBG:  Noted level on 25 not fasting as noted in patient message; 2 of remaining readings elevated  PPBG:  Overall well controlled; 1 elevation after dinner (1 hour post meal)    PLAN     MEDICATIONS:   No DM Medications    DIET:   Continue Assigned Meal Plan (including 3 meals and 3 snacks): 2000 calorie (CHO: 45-15-60-15-60-30) (PRO: 2/3-1-3/4-1-3/4-2)    BLOOD SUGAR MONITORING: (Glucometer: OneTouch Verio Flex)  Continue Testing B x day (Fasting; 2 hours after breakfast/lunch; 1 hour after dinner)    PHYSICAL ACTIVITY:  Continue walking (or other preferred physical activity) daily - recommend at least 20-30 minutes daily, preferably after dinner if able (unless otherwise instructed per OB)     MONITORING     EDUCATION: (Diabetes and Pregnancy Program)    Completed: Class 1 (Pt Goal: \"I will eat 3 meals and 3 snacks each day, including protein at each\") and Class 2    Needs Scheduled: None at this time, Follow-ups to be scheduled as indicated per weekly review of blood sugar logs    WEIGHT:     Pre-Gravid Wt Pre-Gravid BMI TWG   97.1 kg (214 lb) 36.72 8.528 kg (18 lb 12.8 oz)     FETAL MONITORITNG - ULTRASOUNDS  Recent Ultrasounds Findings: NML Growth/DESEAN - 25  US Follow-Ups:  25  Further Fetal Surveillance: Beginning at 32 weeks (NST twice weekly + DESEAN once a week) = Other Recommendation per OB: Weekly NST - beginning at 34 weeks gestation, sooner if otherwise indicated     LABS  Lab Results   Component Value Date/Time    [id number] 151 (H) 2024 05:30 PM    HGBA1C 5.3 2024 06:38 AM       Active Orders (needing to be collected):  None    Aislinn Soria RD "   Diabetes and Pregnancy Program   Maternal Fetal Medicine  [facility]

## 2025-05-14 ENCOUNTER — ROUTINE PRENATAL (OUTPATIENT)
Age: 30
End: 2025-05-14
Payer: COMMERCIAL

## 2025-05-14 VITALS — SYSTOLIC BLOOD PRESSURE: 132 MMHG | WEIGHT: 240.8 LBS | BODY MASS INDEX: 41.33 KG/M2 | DIASTOLIC BLOOD PRESSURE: 82 MMHG

## 2025-05-14 DIAGNOSIS — Z34.03 ENCOUNTER FOR SUPERVISION OF NORMAL FIRST PREGNANCY IN THIRD TRIMESTER: Primary | ICD-10-CM

## 2025-05-14 LAB
SL AMB  POCT GLUCOSE, UA: NORMAL
SL AMB POCT URINE PROTEIN: NORMAL

## 2025-05-14 PROCEDURE — 81002 URINALYSIS NONAUTO W/O SCOPE: CPT | Performed by: OBSTETRICS & GYNECOLOGY

## 2025-05-14 PROCEDURE — PNV: Performed by: OBSTETRICS & GYNECOLOGY

## 2025-05-14 NOTE — PROGRESS NOTES
Routine PN -1 visit.   PN panel completed.  Failed 1 hour glucola, needs 3 hour GTT - has order.   Urine concentrated - encouraged her to increase her PO intake of water.   Pap is up to date, cultures collected.   NT scan completed, NIPT collected.   LDASA ordered by TEODORO.   Level II US scheduled.

## 2025-05-14 NOTE — PROGRESS NOTES
Pt reports +FM and occa cramping.  Denies VB or LOF.  Better with feel swelling after wearing compression stockings.    GBS NV.    S/p RhoGAM.     Birth plan for NV.      PTL, RAMON, wt shelbie, preE reviewed.      Gma allergic to tetanus, advised to find just the pertussis vaccine, if not, to be extra careful.

## 2025-05-16 ENCOUNTER — ULTRASOUND (OUTPATIENT)
Dept: PERINATAL CARE | Facility: CLINIC | Age: 30
End: 2025-05-16
Payer: COMMERCIAL

## 2025-05-16 VITALS
SYSTOLIC BLOOD PRESSURE: 126 MMHG | DIASTOLIC BLOOD PRESSURE: 60 MMHG | BODY MASS INDEX: 41.25 KG/M2 | HEART RATE: 115 BPM | HEIGHT: 64 IN | WEIGHT: 241.6 LBS

## 2025-05-16 DIAGNOSIS — O99.213 OBESITY AFFECTING PREGNANCY IN THIRD TRIMESTER, UNSPECIFIED OBESITY TYPE: ICD-10-CM

## 2025-05-16 DIAGNOSIS — E66.812 CLASS 2 OBESITY: ICD-10-CM

## 2025-05-16 DIAGNOSIS — O24.410 DIET CONTROLLED GESTATIONAL DIABETES MELLITUS (GDM) IN THIRD TRIMESTER: ICD-10-CM

## 2025-05-16 DIAGNOSIS — Z3A.35 35 WEEKS GESTATION OF PREGNANCY: Primary | ICD-10-CM

## 2025-05-16 PROCEDURE — 76815 OB US LIMITED FETUS(S): CPT | Performed by: STUDENT IN AN ORGANIZED HEALTH CARE EDUCATION/TRAINING PROGRAM

## 2025-05-16 PROCEDURE — 59025 FETAL NON-STRESS TEST: CPT | Performed by: STUDENT IN AN ORGANIZED HEALTH CARE EDUCATION/TRAINING PROGRAM

## 2025-05-16 NOTE — PROGRESS NOTES
Non-Stress Testing:    Non-Stress test, equipment, procedure, and expected outcomes explained. Reviewed fetal kick counts and when to call OB.Verified patient understanding of fetal kick counts with teach back method. Patient reports feeling daily fetal movements. Patient has no questions or concerns.     Reviewed non-stress test with Dr. Rosario in-person

## 2025-05-16 NOTE — PATIENT INSTRUCTIONS
Thank you for choosing us for your  care today.  If you have any questions about your ultrasound or care, please do not hesitate to contact us or your primary obstetrician.        Some general instructions for your pregnancy are:    Exercise: Aim for 150 minutes per week of regular exercise.  Walking is great!  Nutrition: Choose healthy sources of calcium, iron, and protein.  Avoid ultraprocessed foods and added sugar.  Learn about Preeclampsia: preeclampsia is a common, potentially serious high blood pressure complication in pregnancy.  A blood pressure of 140mmHg (systolic or top number) or 90mmHg (diastolic or bottom number) should be evaluated by your doctor.  Aspirin is sometimes prescribed in early pregnancy to prevent preeclampsia in women with risk factors - ask your obstetrician if you should be on this medication.  For more resources, visit:  [email or web address]  If you smoke, please try to quit completely but also try to reduce your smoking by as much as possible (as soon as possible).  Do not vape.  Please also avoid cannabis products.  Other warning signs to watch out for in pregnancy or postpartum: chest pain, obstructed breathing or shortness of breath, seizures, thoughts of hurting yourself or your baby, bleeding, a painful or swollen leg, fever, or headache (see [facility]).  If these happen call 911.  Itching is also not normal in pregnancy and if you experience this, especially over your hands and feet, potentially worse at night, notify your doctors.     Kick Counts in Pregnancy   AMBULATORY CARE:   Kick counts  measure how much your baby is moving in your womb. A kick from your baby can be felt as a twist, turn, swish, roll, or jab. It is common to feel your baby kicking at 26 to 28 weeks of pregnancy. You may feel your baby kick as early as 20 weeks of pregnancy. You may want to start counting at 28 weeks.   Contact your  doctor immediately if:   You feel a change in the number of kicks or movements of your baby.      You feel fewer than 10 kicks within 2 hours.      You have questions or concerns about your baby's movements.     Why measure kick counts:  Your baby's movement may provide information about your baby's health. He or she may move less, or not at all, if there are problems. Your baby may move less if he or she is not getting enough oxygen or nutrition from the placenta. Do not smoke while you are pregnant. Smoking decreases the amount of oxygen that gets to your baby. Talk to your healthcare provider if you need help to quit smoking. Tell your healthcare provider as soon as you feel a change in your baby's movements.  When to measure kick counts:   Measure kick counts at the same time every day.       Measure kick counts when your baby is awake and most active. Your baby may be most active in the evening.     How to measure kick counts:  Check that your baby is awake before you measure kick counts. You can wake up your baby by lightly pushing on your belly, walking, or drinking something cold. Your healthcare provider may tell you different ways to measure kick counts. You may be told to do the following:  Use a chart or clock to keep track of the time you start and finish counting.      Sit in a chair or lie on your left side.      Place your hands on the largest part of your belly.      Count until you reach 10 kicks. Write down how much time it takes to count 10 kicks.      It may take 30 minutes to 2 hours to count 10 kicks. It should not take more than 2 hours to count 10 kicks.     Follow up with your doctor as directed:  Write down your questions so you remember to ask them during your visits.   © Copyright [employer] 2023 Information is for End User's use only and may not be sold, redistributed or otherwise used for commercial purposes.  The above information is an  only. It is not intended as  medical advice for individual conditions or treatments. Talk to your doctor, nurse or pharmacist before following any medical regimen to see if it is safe and effective for you. Nonstress Test for Pregnancy   WHAT YOU NEED TO KNOW:   What do I need to know about a nonstress test?  A nonstress test measures your baby's heart rate and movements. Nonstress means that no stress will be placed on your baby during the test.  How do I prepare for a nonstress test?  Your healthcare provider will talk to you about how to prepare for this test. Your provider may tell you to eat and drink plenty of liquids before your test. If you smoke, you may be asked not to smoke within 2 hours before the test. Your provider will also tell you which medicines to take or not take on the day of your test.  What will happen during a nonstress test?  You may be asked to lie down or recline back for the test on a bed. One or 2 belts with sensors will be placed around your abdomen. Your baby's heart rate will be recorded with a machine. If your baby does not move, your baby may be asleep. Your healthcare provider may make a noise near your abdomen to try to wake your baby. The test usually takes about 20 minutes, but can take longer if your baby needs to be awakened.        What do I need to know about the test results?  Your baby will be expected to move at least 2 times for a certain amount of time. Your baby's heart rate will be expected to go up by a certain number of beats per minute during movement. If your baby does not move as expected, the test may need to be repeated or you may need other tests.  CARE AGREEMENT:   You have the right to help plan your care. Learn about your health condition and how it may be treated. Discuss treatment options with your healthcare providers to decide what care you want to receive. You always have the right to refuse treatment. The above information is an  only. It is not intended as  medical advice for individual conditions or treatments. Talk to your doctor, nurse or pharmacist before following any medical regimen to see if it is safe and effective for you.  © 2023 Information is for End User's use only and may not be sold, redistributed or otherwise used for commercial purposes. Thank you for choosing us for your  care today.  If you have any questions about your ultrasound or care, please do not hesitate to contact us or your primary obstetrician.        Some general instructions for your pregnancy are:    Exercise: Aim for 150 minutes per week of regular exercise.  Walking is great!  Nutrition: Choose healthy sources of calcium, iron, and protein.  Avoid ultraprocessed foods and added sugar.  Learn about Preeclampsia: preeclampsia is a common, potentially serious high blood pressure complication in pregnancy.  A blood pressure of 140mmHg (systolic or top number) or 90mmHg (diastolic or bottom number) should be evaluated by your doctor.  Aspirin is sometimes prescribed in early pregnancy to prevent preeclampsia in women with risk factors - ask your obstetrician if you should be on this medication.  For more resources, visit:  [email or web address]  If you smoke, please try to quit completely but also try to reduce your smoking by as much as possible (as soon as possible).  Do not vape.  Please also avoid cannabis products.  Other warning signs to watch out for in pregnancy or postpartum: chest pain, obstructed breathing or shortness of breath, seizures, thoughts of hurting yourself or your baby, bleeding, a painful or swollen leg, fever, or headache (see [facility] POST-BIRTH Warning Signs campaign).  If these happen call 911.  Itching is also not normal in pregnancy and if you experience this, especially over your hands and feet, potentially worse at night, notify your doctors.

## 2025-05-16 NOTE — LETTER
NST sleeve cover sheet    Patient name: Maribel Morelos  : 1995  MRN: [id number]    IVETT: Estimated Date of Delivery: 6/15/25    Obstetrician: DR. COLÓN      Reason(s) for testing:  __________________________________________      Testing frequency:    ___ 2x/wk  ___ 1x/wk  ___ Dopplers  ___ BPP?      Last growth scan: __________________________________________

## 2025-05-16 NOTE — PROGRESS NOTES
This patient received  care under my supervision on 25 at 35w5d gestational age at [facility].  NST is reactive.  -Deedee Rsoario MD

## 2025-05-22 ENCOUNTER — ROUTINE PRENATAL (OUTPATIENT)
Age: 30
End: 2025-05-22
Payer: COMMERCIAL

## 2025-05-22 VITALS — WEIGHT: 241.8 LBS | SYSTOLIC BLOOD PRESSURE: 118 MMHG | BODY MASS INDEX: 41.5 KG/M2 | DIASTOLIC BLOOD PRESSURE: 86 MMHG

## 2025-05-22 DIAGNOSIS — Z34.03 ENCOUNTER FOR SUPERVISION OF NORMAL FIRST PREGNANCY IN THIRD TRIMESTER: Primary | ICD-10-CM

## 2025-05-22 DIAGNOSIS — Z36.85 ANTENATAL SCREENING FOR STREPTOCOCCUS B: ICD-10-CM

## 2025-05-22 DIAGNOSIS — O24.410 DIET CONTROLLED GESTATIONAL DIABETES MELLITUS (GDM) IN THIRD TRIMESTER: ICD-10-CM

## 2025-05-22 LAB
SL AMB  POCT GLUCOSE, UA: NEGATIVE
SL AMB POCT URINE PROTEIN: NEGATIVE

## 2025-05-22 PROCEDURE — 81002 URINALYSIS NONAUTO W/O SCOPE: CPT | Performed by: OBSTETRICS & GYNECOLOGY

## 2025-05-22 PROCEDURE — 87150 DNA/RNA AMPLIFIED PROBE: CPT | Performed by: OBSTETRICS & GYNECOLOGY

## 2025-05-22 PROCEDURE — PNV: Performed by: OBSTETRICS & GYNECOLOGY

## 2025-05-22 NOTE — PATIENT INSTRUCTIONS
Thank you for choosing us for your  care today.  If you have any questions about your ultrasound or care, please do not hesitate to contact us or your primary obstetrician.        Some general instructions for your pregnancy are:    Exercise: Aim for 150 minutes per week of regular exercise.  Walking is great!  Nutrition: Choose healthy sources of calcium, iron, and protein.  Avoid ultraprocessed foods and added sugar.  Learn about Preeclampsia: preeclampsia is a common, potentially serious high blood pressure complication in pregnancy.  A blood pressure of 140mmHg (systolic or top number) or 90mmHg (diastolic or bottom number) should be evaluated by your doctor.  Aspirin is sometimes prescribed in early pregnancy to prevent preeclampsia in women with risk factors - ask your obstetrician if you should be on this medication.  For more resources, visit:  [email or web address]  If you smoke, please try to quit completely but also try to reduce your smoking by as much as possible (as soon as possible).  Do not vape.  Please also avoid cannabis products.  Other warning signs to watch out for in pregnancy or postpartum: chest pain, obstructed breathing or shortness of breath, seizures, thoughts of hurting yourself or your baby, bleeding, a painful or swollen leg, fever, or headache (see [facility]).  If these happen call 911.  Itching is also not normal in pregnancy and if you experience this, especially over your hands and feet, potentially worse at night, notify your doctors.     Kick Counts in Pregnancy   AMBULATORY CARE:   Kick counts  measure how much your baby is moving in your womb. A kick from your baby can be felt as a twist, turn, swish, roll, or jab. It is common to feel your baby kicking at 26 to 28 weeks of pregnancy. You may feel your baby kick as early as 20 weeks of pregnancy. You may want to start counting at 28 weeks.   Contact your  doctor immediately if:   You feel a change in the number of kicks or movements of your baby.      You feel fewer than 10 kicks within 2 hours.      You have questions or concerns about your baby's movements.     Why measure kick counts:  Your baby's movement may provide information about your baby's health. He or she may move less, or not at all, if there are problems. Your baby may move less if he or she is not getting enough oxygen or nutrition from the placenta. Do not smoke while you are pregnant. Smoking decreases the amount of oxygen that gets to your baby. Talk to your healthcare provider if you need help to quit smoking. Tell your healthcare provider as soon as you feel a change in your baby's movements.  When to measure kick counts:   Measure kick counts at the same time every day.       Measure kick counts when your baby is awake and most active. Your baby may be most active in the evening.     How to measure kick counts:  Check that your baby is awake before you measure kick counts. You can wake up your baby by lightly pushing on your belly, walking, or drinking something cold. Your healthcare provider may tell you different ways to measure kick counts. You may be told to do the following:  Use a chart or clock to keep track of the time you start and finish counting.      Sit in a chair or lie on your left side.      Place your hands on the largest part of your belly.      Count until you reach 10 kicks. Write down how much time it takes to count 10 kicks.      It may take 30 minutes to 2 hours to count 10 kicks. It should not take more than 2 hours to count 10 kicks.     Follow up with your doctor as directed:  Write down your questions so you remember to ask them during your visits.   © Copyright [employer] 2023 Information is for End User's use only and may not be sold, redistributed or otherwise used for commercial purposes.  The above information is an  only. It is not intended as  medical advice for individual conditions or treatments. Talk to your doctor, nurse or pharmacist before following any medical regimen to see if it is safe and effective for you. Nonstress Test for Pregnancy   WHAT YOU NEED TO KNOW:   What do I need to know about a nonstress test?  A nonstress test measures your baby's heart rate and movements. Nonstress means that no stress will be placed on your baby during the test.  How do I prepare for a nonstress test?  Your healthcare provider will talk to you about how to prepare for this test. Your provider may tell you to eat and drink plenty of liquids before your test. If you smoke, you may be asked not to smoke within 2 hours before the test. Your provider will also tell you which medicines to take or not take on the day of your test.  What will happen during a nonstress test?  You may be asked to lie down or recline back for the test on a bed. One or 2 belts with sensors will be placed around your abdomen. Your baby's heart rate will be recorded with a machine. If your baby does not move, your baby may be asleep. Your healthcare provider may make a noise near your abdomen to try to wake your baby. The test usually takes about 20 minutes, but can take longer if your baby needs to be awakened.        What do I need to know about the test results?  Your baby will be expected to move at least 2 times for a certain amount of time. Your baby's heart rate will be expected to go up by a certain number of beats per minute during movement. If your baby does not move as expected, the test may need to be repeated or you may need other tests.  CARE AGREEMENT:   You have the right to help plan your care. Learn about your health condition and how it may be treated. Discuss treatment options with your healthcare providers to decide what care you want to receive. You always have the right to refuse treatment. The above information is an  only. It is not intended as  medical advice for individual conditions or treatments. Talk to your doctor, nurse or pharmacist before following any medical regimen to see if it is safe and effective for you.  © 2023 Information is for End User's use only and may not be sold, redistributed or otherwise used for commercial purposes. Thank you for choosing us for your  care today.  If you have any questions about your ultrasound or care, please do not hesitate to contact us or your primary obstetrician.        Some general instructions for your pregnancy are:    Exercise: Aim for 150 minutes per week of regular exercise.  Walking is great!  Nutrition: Choose healthy sources of calcium, iron, and protein.  Avoid ultraprocessed foods and added sugar.  Learn about Preeclampsia: preeclampsia is a common, potentially serious high blood pressure complication in pregnancy.  A blood pressure of 140mmHg (systolic or top number) or 90mmHg (diastolic or bottom number) should be evaluated by your doctor.  Aspirin is sometimes prescribed in early pregnancy to prevent preeclampsia in women with risk factors - ask your obstetrician if you should be on this medication.  For more resources, visit:  [email or web address]  If you smoke, please try to quit completely but also try to reduce your smoking by as much as possible (as soon as possible).  Do not vape.  Please also avoid cannabis products.  Other warning signs to watch out for in pregnancy or postpartum: chest pain, obstructed breathing or shortness of breath, seizures, thoughts of hurting yourself or your baby, bleeding, a painful or swollen leg, fever, or headache (see [facility] POST-BIRTH Warning Signs campaign).  If these happen call 911.  Itching is also not normal in pregnancy and if you experience this, especially over your hands and feet, potentially worse at night, notify your doctors.

## 2025-05-22 NOTE — ASSESSMENT & PLAN NOTE
Overall well controlled with diet.   Growth tomorrow.   Continue to report blood sugars  Lab Results   Component Value Date    HGBA1C 5.3 04/03/2024

## 2025-05-22 NOTE — PROGRESS NOTES
3rd Trimester Visit  Name: Maribel Morelos      : 1995      MRN: [id number]  Encounter Provider: Georgia Stephen MD  Encounter Date: 2025   Encounter department: [facility] [place]    30 y.o.  at 36w4d presenting for routine OB visit at 36w4d.:  Assessment & Plan  Encounter for supervision of normal first pregnancy in third trimester    Orders:    POCT urine dip     screening for streptococcus B    Orders:    Strep B DNA probe, amplification    Diet controlled gestational diabetes mellitus (GDM) in third trimester [O24.410]  Overall well controlled with diet.   Growth tomorrow.   Continue to report blood sugars  Lab Results   Component Value Date    HGBA1C 5.3 2024                Scheduled for ultrasound tomorrow - growth.  Reviewed premature labor precautions and fetal kick counts.  Advised to continue medications and return in 1 weeks.    History of Present Illness     She reports overall doing well.  Has stopped her ASA  Denies uterine contractions.  Denies vaginal bleeding or leaking of fluid.  Reports adequate fetal movement of at least 10 movements in 2 hours once daily.  He had a slow morning but on the drive here moved a ton.           Current Outpatient Medications   Medication Instructions    Blood Glucose Monitoring Suppl (OneTouch Verio Reflect) w/Device KIT Use as directed    ketoconazole (NIZORAL) 2 % shampoo Daily for 2 weeks then once a month. Apply on damp skin, leave on for 5mins then rinse.    nystatin (MYCOSTATIN) ointment Topical, 2 times daily    OneTouch Delica Lancets 33G MISC Use 4 a Day or as instructed    OneTouch Verio test strip Test 4 Times Daily or as instructed    Prenatal Vit-Fe Fumarate-FA (PRENATAL PO) 1 tablet, Daily     Objective   /86   Wt 110 kg (241 lb 12.8 oz)   LMP 2024 (Exact Date)   BMI 41.50 kg/m²      BP: Blood Pressure: 118/86  Wt: 110 kg (241 lb 12.8 oz); Body mass index is 41.5 kg/m².;  TWG=12.6 kg (27 lb 12.8 oz)  Fetal Heart Rate: 150; Fundal Height (cm): 38 cm    Abdomen: gravid, non-tender

## 2025-05-22 NOTE — PROGRESS NOTES
PN visit  36w/4d  US for fetal growth completed 5/16/25  GBS today  Tdap & Rhogam up to date  Delivery consent previously signed  She has cramping; denies spotting or loss of fluid  + fetal movement

## 2025-05-23 ENCOUNTER — ULTRASOUND (OUTPATIENT)
Dept: PERINATAL CARE | Facility: CLINIC | Age: 30
End: 2025-05-23
Payer: COMMERCIAL

## 2025-05-23 ENCOUNTER — ANCILLARY PROCEDURE (OUTPATIENT)
Dept: PERINATAL CARE | Facility: CLINIC | Age: 30
End: 2025-05-23
Attending: OBSTETRICS & GYNECOLOGY
Payer: COMMERCIAL

## 2025-05-23 VITALS
HEART RATE: 114 BPM | SYSTOLIC BLOOD PRESSURE: 116 MMHG | HEIGHT: 64 IN | BODY MASS INDEX: 41.48 KG/M2 | WEIGHT: 243 LBS | DIASTOLIC BLOOD PRESSURE: 72 MMHG

## 2025-05-23 DIAGNOSIS — O99.213 OBESITY AFFECTING PREGNANCY IN THIRD TRIMESTER, UNSPECIFIED OBESITY TYPE: ICD-10-CM

## 2025-05-23 DIAGNOSIS — Z36.89 ENCOUNTER FOR ULTRASOUND TO CHECK FETAL GROWTH: ICD-10-CM

## 2025-05-23 DIAGNOSIS — O36.63X0 FETAL MACROSOMIA DURING PREGNANCY IN THIRD TRIMESTER, SINGLE OR UNSPECIFIED FETUS: ICD-10-CM

## 2025-05-23 DIAGNOSIS — Z3A.36 36 WEEKS GESTATION OF PREGNANCY: ICD-10-CM

## 2025-05-23 DIAGNOSIS — E28.2 PCOS (POLYCYSTIC OVARIAN SYNDROME): ICD-10-CM

## 2025-05-23 DIAGNOSIS — O99.213 OBESITY AFFECTING PREGNANCY, ANTEPARTUM, THIRD TRIMESTER: ICD-10-CM

## 2025-05-23 DIAGNOSIS — O24.410 GDM (GESTATIONAL DIABETES MELLITUS), CLASS A1: ICD-10-CM

## 2025-05-23 DIAGNOSIS — O24.410 DIET CONTROLLED GESTATIONAL DIABETES MELLITUS (GDM) IN THIRD TRIMESTER: Primary | ICD-10-CM

## 2025-05-23 PROBLEM — Z00.00 ANNUAL PHYSICAL EXAM: Status: RESOLVED | Noted: 2024-07-15 | Resolved: 2025-05-23

## 2025-05-23 PROCEDURE — 59025 FETAL NON-STRESS TEST: CPT | Performed by: OBSTETRICS & GYNECOLOGY

## 2025-05-23 PROCEDURE — 76816 OB US FOLLOW-UP PER FETUS: CPT

## 2025-05-23 NOTE — PROGRESS NOTES
Repeat Non-Stress Testing:    Patient verbalizes +FM. Pt denies ALL:               Leaking of fluid   Contractions   Vaginal bleeding   Decreased fetal movement    Patient is performing daily kick counts. Patient has no questions or concerns.   NST strip reviewed by Dr. Juarez in-person.

## 2025-05-23 NOTE — ASSESSMENT & PLAN NOTE
All patients with GDM should have close follow-up with our multidisciplinary Diabetes in Pregnancy Program as well as postpartum screening for overt diabetes mellitus via 75g test.

## 2025-05-23 NOTE — ASSESSMENT & PLAN NOTE
"The term \"macrosomia\" implies growth beyond an absolute birth weight, historically 4,000 g or 4,500g, regardless of gestational age. As birth weight increases, the likelihood of labor abnormalities, shoulder dystocia, birth trauma, and permanent injury to the  increases. Maternal factors associated with macrosomia include constitutional factors, preexisting diabetes and GDM, maternal prepregnancy obesity, excessive gestational weight gain, hyperglycemia, dyslipidemia, prior macrosomia, and postterm pregnancy (>=42 weeks). Diagnosis of macrosomia is challenging, as an accurate diagnosis can only be made after birth, and the prenatal prediction of  birth weight is imprecise, and although published formulas for estimating fetal weight show a correlation with birth weight, the variability of the estimate is up to 20% with most of the formulas. Despite the imprecise prediction of macrosomia, scheduled  birth may be beneficial for newborns with suspected macrosomia who have an estimated fetal weight of at least 5000g (11 pounds) in women without diabetes and an estimated fetal weight of at least 4500g (9 pounds 14 ounces) in women with diabetes.  birth reduces, but does not eliminate, the risk of birth trauma and  brachial plexus palsy associated with macrosomia.  Today's estimated weight is not above the threshold at which  delivery is recommended for the purpose of prevention of shoulder dystocia, however, (see graph of EFW) at 39 weeks it is close therefore I advise a followup study to help guide delivery route.  "

## 2025-05-23 NOTE — LETTER
"May 23, 2025     Slime Owens MD  [address] [facility] [address]    Patient: Maribel Morelos   YOB: 1995   Date of Visit: 2025       Dear MD RIGOBERTO Quiñones MD Christine A Talmage, MD:    Thank you for referring Maribel Morelos to me for evaluation. Below are my notes for this consultation.  Note macrosomia and GDM and we are doing a repeat growth in 2 to help guide delivery route.    If you have questions, please do not hesitate to call me. I look forward to following your patient along with you.         Sincerely,        Coby Juarez MD        CC: MD Georgia Black MD Meredith L Birsner, MD  2025  2:59 PM  Sign when Signing Visit  [facility]: Maribel was seen today for NST (found under the pregnancy episode) which I reviewed the RN assessment and agree, and fetal growth ultrasound (report with images are contained in the ultrasound report located under Chart Review tab in Epic).     -----------------------------------------    Problem List Items Addressed This Visit          Endocrine    Diet controlled gestational diabetes mellitus (GDM) in third trimester [O24.410] - Primary    All patients with GDM should have close follow-up with our multidisciplinary Diabetes in Pregnancy Program as well as postpartum screening for overt diabetes mellitus via 75g test.              Obstetrics/Gynecology    Obesity complicating pregnancy, third trimester    Continue weekly testing.         Fetal macrosomia during pregnancy in third trimester    The term \"macrosomia\" implies growth beyond an absolute birth weight, historically 4,000 g or 4,500g, regardless of gestational age. As birth weight increases, the likelihood of labor abnormalities, shoulder dystocia, birth trauma, and permanent injury to the  increases. Maternal factors associated with macrosomia include constitutional " factors, preexisting diabetes and GDM, maternal prepregnancy obesity, excessive gestational weight gain, hyperglycemia, dyslipidemia, prior macrosomia, and postterm pregnancy (>=42 weeks). Diagnosis of macrosomia is challenging, as an accurate diagnosis can only be made after birth, and the prenatal prediction of  birth weight is imprecise, and although published formulas for estimating fetal weight show a correlation with birth weight, the variability of the estimate is up to 20% with most of the formulas. Despite the imprecise prediction of macrosomia, scheduled  birth may be beneficial for newborns with suspected macrosomia who have an estimated fetal weight of at least 5000g (11 pounds) in women without diabetes and an estimated fetal weight of at least 4500g (9 pounds 14 ounces) in women with diabetes.  birth reduces, but does not eliminate, the risk of birth trauma and  brachial plexus palsy associated with macrosomia.  Today's estimated weight is not above the threshold at which  delivery is recommended for the purpose of prevention of shoulder dystocia, however, (see graph of EFW) at 39 weeks it is close therefore I advise a followup study to help guide delivery route.          Other Visit Diagnoses         36 weeks gestation of pregnancy          Encounter for ultrasound to check fetal growth                The time spent on this established patient on the encounter date included 5 minutes previsit service time reviewing records and precharting, 5 minutes face-to-face service time counseling regarding results and coordinating care, and  4 minutes charting, totalling 14 minutes.  Please don't hesitate to contact our office with any concerns or questions.  -Coby Juarez MD

## 2025-05-23 NOTE — PROGRESS NOTES
"[facility]: Maribel was seen today for NST (found under the pregnancy episode) which I reviewed the RN assessment and agree, and fetal growth ultrasound (report with images are contained in the ultrasound report located under Chart Review tab in Epic).     -----------------------------------------    Problem List Items Addressed This Visit          Endocrine    Diet controlled gestational diabetes mellitus (GDM) in third trimester [O24.410] - Primary    All patients with GDM should have close follow-up with our multidisciplinary Diabetes in Pregnancy Program as well as postpartum screening for overt diabetes mellitus via 75g test.              Obstetrics/Gynecology    Obesity complicating pregnancy, third trimester    Continue weekly testing.         Fetal macrosomia during pregnancy in third trimester    The term \"macrosomia\" implies growth beyond an absolute birth weight, historically 4,000 g or 4,500g, regardless of gestational age. As birth weight increases, the likelihood of labor abnormalities, shoulder dystocia, birth trauma, and permanent injury to the  increases. Maternal factors associated with macrosomia include constitutional factors, preexisting diabetes and GDM, maternal prepregnancy obesity, excessive gestational weight gain, hyperglycemia, dyslipidemia, prior macrosomia, and postterm pregnancy (>=42 weeks). Diagnosis of macrosomia is challenging, as an accurate diagnosis can only be made after birth, and the prenatal prediction of  birth weight is imprecise, and although published formulas for estimating fetal weight show a correlation with birth weight, the variability of the estimate is up to 20% with most of the formulas. Despite the imprecise prediction of macrosomia, scheduled  birth may be beneficial for newborns with suspected macrosomia who have an estimated fetal weight of at least 5000g (11 pounds) in women without diabetes and an estimated " fetal weight of at least 4500g (9 pounds 14 ounces) in women with diabetes.  birth reduces, but does not eliminate, the risk of birth trauma and  brachial plexus palsy associated with macrosomia.  Today's estimated weight is not above the threshold at which  delivery is recommended for the purpose of prevention of shoulder dystocia, however, (see graph of EFW) at 39 weeks it is close therefore I advise a followup study to help guide delivery route.          Other Visit Diagnoses         36 weeks gestation of pregnancy          Encounter for ultrasound to check fetal growth                The time spent on this established patient on the encounter date included 5 minutes previsit service time reviewing records and precharting, 5 minutes face-to-face service time counseling regarding results and coordinating care, and  4 minutes charting, totalling 14 minutes.  Please don't hesitate to contact our office with any concerns or questions.  -Coby Juarez MD

## 2025-05-27 LAB — GP B STREP DNA SPEC QL NAA+PROBE: POSITIVE

## 2025-05-29 ENCOUNTER — ROUTINE PRENATAL (OUTPATIENT)
Age: 30
End: 2025-05-29
Payer: COMMERCIAL

## 2025-05-29 VITALS — BODY MASS INDEX: 41.92 KG/M2 | DIASTOLIC BLOOD PRESSURE: 80 MMHG | SYSTOLIC BLOOD PRESSURE: 130 MMHG | WEIGHT: 244.2 LBS

## 2025-05-29 DIAGNOSIS — Z34.03 ENCOUNTER FOR SUPERVISION OF NORMAL FIRST PREGNANCY IN THIRD TRIMESTER: Primary | ICD-10-CM

## 2025-05-29 DIAGNOSIS — O36.63X0 EXCESSIVE FETAL GROWTH AFFECTING MANAGEMENT OF PREGNANCY IN THIRD TRIMESTER, SINGLE OR UNSPECIFIED FETUS: ICD-10-CM

## 2025-05-29 LAB
GP B STREP DNA SPEC QL NAA+PROBE: ABNORMAL
GP B STREP DNA SPEC QL NAA+PROBE: ABNORMAL
SL AMB  POCT GLUCOSE, UA: NORMAL
SL AMB POCT URINE PROTEIN: NORMAL

## 2025-05-29 PROCEDURE — PNV: Performed by: OBSTETRICS & GYNECOLOGY

## 2025-05-29 PROCEDURE — 81002 URINALYSIS NONAUTO W/O SCOPE: CPT | Performed by: OBSTETRICS & GYNECOLOGY

## 2025-05-29 NOTE — PATIENT INSTRUCTIONS
Thank you for choosing us for your  care today.  If you have any questions about your ultrasound or care, please do not hesitate to contact us or your primary obstetrician.        Some general instructions for your pregnancy are:    Exercise: Aim for 150 minutes per week of regular exercise.  Walking is great!  Nutrition: Choose healthy sources of calcium, iron, and protein.  Avoid ultraprocessed foods and added sugar.  Learn about Preeclampsia: preeclampsia is a common, potentially serious high blood pressure complication in pregnancy.  A blood pressure of 140mmHg (systolic or top number) or 90mmHg (diastolic or bottom number) should be evaluated by your doctor.  Aspirin is sometimes prescribed in early pregnancy to prevent preeclampsia in women with risk factors - ask your obstetrician if you should be on this medication.  For more resources, visit:  https://www.[email or web address]  If you smoke, please try to quit completely but also try to reduce your smoking by as much as possible (as soon as possible).  Do not vape.  Please also avoid cannabis products.  Other warning signs to watch out for in pregnancy or postpartum: chest pain, obstructed breathing or shortness of breath, seizures, thoughts of hurting yourself or your baby, bleeding, a painful or swollen leg, fever, or headache (see [facility]).  If these happen call 911.  Itching is also not normal in pregnancy and if you experience this, especially over your hands and feet, potentially worse at night, notify your doctors.     Kick Counts in Pregnancy   AMBULATORY CARE:   Kick counts  measure how much your baby is moving in your womb. A kick from your baby can be felt as a twist, turn, swish, roll, or jab. It is common to feel your baby kicking at 26 to 28 weeks of pregnancy. You may feel your baby kick as early as 20 weeks of pregnancy. You may want to start counting at 28 weeks.   Contact your  doctor immediately if:   You feel a change in the number of kicks or movements of your baby.      You feel fewer than 10 kicks within 2 hours.      You have questions or concerns about your baby's movements.     Why measure kick counts:  Your baby's movement may provide information about your baby's health. He or she may move less, or not at all, if there are problems. Your baby may move less if he or she is not getting enough oxygen or nutrition from the placenta. Do not smoke while you are pregnant. Smoking decreases the amount of oxygen that gets to your baby. Talk to your healthcare provider if you need help to quit smoking. Tell your healthcare provider as soon as you feel a change in your baby's movements.  When to measure kick counts:   Measure kick counts at the same time every day.       Measure kick counts when your baby is awake and most active. Your baby may be most active in the evening.     How to measure kick counts:  Check that your baby is awake before you measure kick counts. You can wake up your baby by lightly pushing on your belly, walking, or drinking something cold. Your healthcare provider may tell you different ways to measure kick counts. You may be told to do the following:  Use a chart or clock to keep track of the time you start and finish counting.      Sit in a chair or lie on your left side.      Place your hands on the largest part of your belly.      Count until you reach 10 kicks. Write down how much time it takes to count 10 kicks.      It may take 30 minutes to 2 hours to count 10 kicks. It should not take more than 2 hours to count 10 kicks.     Follow up with your doctor as directed:  Write down your questions so you remember to ask them during your visits.   © Copyright [employer] 2023 Information is for End User's use only and may not be sold, redistributed or otherwise used for commercial purposes.  The above information is an  only. It is not intended as  medical advice for individual conditions or treatments. Talk to your doctor, nurse or pharmacist before following any medical regimen to see if it is safe and effective for you. Nonstress Test for Pregnancy   WHAT YOU NEED TO KNOW:   What do I need to know about a nonstress test?  A nonstress test measures your baby's heart rate and movements. Nonstress means that no stress will be placed on your baby during the test.  How do I prepare for a nonstress test?  Your healthcare provider will talk to you about how to prepare for this test. Your provider may tell you to eat and drink plenty of liquids before your test. If you smoke, you may be asked not to smoke within 2 hours before the test. Your provider will also tell you which medicines to take or not take on the day of your test.  What will happen during a nonstress test?  You may be asked to lie down or recline back for the test on a bed. One or 2 belts with sensors will be placed around your abdomen. Your baby's heart rate will be recorded with a machine. If your baby does not move, your baby may be asleep. Your healthcare provider may make a noise near your abdomen to try to wake your baby. The test usually takes about 20 minutes, but can take longer if your baby needs to be awakened.        What do I need to know about the test results?  Your baby will be expected to move at least 2 times for a certain amount of time. Your baby's heart rate will be expected to go up by a certain number of beats per minute during movement. If your baby does not move as expected, the test may need to be repeated or you may need other tests.  CARE AGREEMENT:   You have the right to help plan your care. Learn about your health condition and how it may be treated. Discuss treatment options with your healthcare providers to decide what care you want to receive. You always have the right to refuse treatment. The above information is an  only. It is not intended as  medical advice for individual conditions or treatments. Talk to your doctor, nurse or pharmacist before following any medical regimen to see if it is safe and effective for you.  © 2023 Information is for End User's use only and may not be sold, redistributed or otherwise used for commercial purposes. Thank you for choosing us for your  care today.  If you have any questions about your ultrasound or care, please do not hesitate to contact us or your primary obstetrician.        Some general instructions for your pregnancy are:    Exercise: Aim for 150 minutes per week of regular exercise.  Walking is great!  Nutrition: Choose healthy sources of calcium, iron, and protein.  Avoid ultraprocessed foods and added sugar.  Learn about Preeclampsia: preeclampsia is a common, potentially serious high blood pressure complication in pregnancy.  A blood pressure of 140mmHg (systolic or top number) or 90mmHg (diastolic or bottom number) should be evaluated by your doctor.  Aspirin is sometimes prescribed in early pregnancy to prevent preeclampsia in women with risk factors - ask your obstetrician if you should be on this medication.  For more resources, visit:  https://www.highriskpregnancyinfo.org/preeclampsia  If you smoke, please try to quit completely but also try to reduce your smoking by as much as possible (as soon as possible).  Do not vape.  Please also avoid cannabis products.  Other warning signs to watch out for in pregnancy or postpartum: chest pain, obstructed breathing or shortness of breath, seizures, thoughts of hurting yourself or your baby, bleeding, a painful or swollen leg, fever, or headache (see [facility] POST-BIRTH Warning Signs campaign).  If these happen call 911.  Itching is also not normal in pregnancy and if you experience this, especially over your hands and feet, potentially worse at night, notify your doctors.

## 2025-05-29 NOTE — PROGRESS NOTES
PN visit  37w/4d  Birth plan in chart  Has breast pump  Delivery consent previously signed  Up to date Tdap & Rhogam  GBS: positive    NST/DESEAN: weekly; next scheduled 5/30/25  NST 5/23/25  Cervical check desired: unsure  She has cramping; denies spotting or loss of fluid  + fetal movement (is meeting fetal kick counts)  She feels he isn't moving around as much

## 2025-05-29 NOTE — PROGRESS NOTES
Had a stretching feeling yesterday lasting a few min.  Feels less FM than before but meeting RAMON a few times a day.  Denies ctx, LOF or VB.      Suspected LGA:  for u/s on Tuesday.  Had a long discussion about IOL and shoulder dystocia.  Discussed possibility of elective c/s.      Repeat BP:  130/80.     GBS +, PCN allergy!    Rh neg, s/p RhoGAM.    Labor precautions, RAMON, Ezra sxs reviewed.

## 2025-05-30 ENCOUNTER — ANCILLARY PROCEDURE (OUTPATIENT)
Dept: PERINATAL CARE | Facility: CLINIC | Age: 30
End: 2025-05-30
Attending: OBSTETRICS & GYNECOLOGY
Payer: COMMERCIAL

## 2025-05-30 ENCOUNTER — ULTRASOUND (OUTPATIENT)
Dept: PERINATAL CARE | Facility: CLINIC | Age: 30
End: 2025-05-30
Payer: COMMERCIAL

## 2025-05-30 VITALS
BODY MASS INDEX: 41.89 KG/M2 | DIASTOLIC BLOOD PRESSURE: 62 MMHG | SYSTOLIC BLOOD PRESSURE: 128 MMHG | WEIGHT: 245.4 LBS | HEIGHT: 64 IN | HEART RATE: 113 BPM

## 2025-05-30 DIAGNOSIS — Z3A.37 37 WEEKS GESTATION OF PREGNANCY: Primary | ICD-10-CM

## 2025-05-30 DIAGNOSIS — O99.213 OBESITY AFFECTING PREGNANCY IN THIRD TRIMESTER, UNSPECIFIED OBESITY TYPE: ICD-10-CM

## 2025-05-30 DIAGNOSIS — Z3A.37 37 WEEKS GESTATION OF PREGNANCY: ICD-10-CM

## 2025-05-30 DIAGNOSIS — E66.812 CLASS 2 OBESITY: ICD-10-CM

## 2025-05-30 PROCEDURE — 76815 OB US LIMITED FETUS(S): CPT | Performed by: STUDENT IN AN ORGANIZED HEALTH CARE EDUCATION/TRAINING PROGRAM

## 2025-05-30 PROCEDURE — 59025 FETAL NON-STRESS TEST: CPT | Performed by: STUDENT IN AN ORGANIZED HEALTH CARE EDUCATION/TRAINING PROGRAM

## 2025-05-30 NOTE — PROGRESS NOTES
This patient received  care under my supervision on 25 at 37w5d gestational age at [facility].  NST is reactive.  -Deedee Rosario MD

## 2025-05-30 NOTE — PROGRESS NOTES
Repeat Non-Stress Testing:    Patient verbalizes +FM. Pt denies ALL:               Leaking of fluid   Contractions   Vaginal bleeding   Decreased fetal movement    Patient is performing daily kick counts. Patient has no questions or concerns.   NST strip reviewed by Dr. Rosario in-person.

## 2025-06-02 ENCOUNTER — HOSPITAL ENCOUNTER (EMERGENCY)
Facility: HOSPITAL | Age: 30
Discharge: HOME | End: 2025-06-02
Admitting: OBSTETRICS & GYNECOLOGY
Payer: COMMERCIAL

## 2025-06-02 VITALS
SYSTOLIC BLOOD PRESSURE: 117 MMHG | HEART RATE: 116 BPM | DIASTOLIC BLOOD PRESSURE: 70 MMHG | RESPIRATION RATE: 18 BRPM | OXYGEN SATURATION: 100 % | TEMPERATURE: 97.9 F

## 2025-06-02 PROBLEM — O36.8130 DECREASED FETAL MOVEMENT AFFECTING MANAGEMENT OF PREGNANCY IN THIRD TRIMESTER: Status: ACTIVE | Noted: 2025-06-02

## 2025-06-02 PROCEDURE — 99212 OFFICE O/P EST SF 10 MIN: CPT

## 2025-06-02 PROCEDURE — 59025 FETAL NON-STRESS TEST: CPT | Performed by: OBSTETRICS & GYNECOLOGY

## 2025-06-02 PROCEDURE — G0463 HOSPITAL OUTPT CLINIC VISIT: HCPCS

## 2025-06-02 PROCEDURE — 76815 OB US LIMITED FETUS(S): CPT | Performed by: OBSTETRICS & GYNECOLOGY

## 2025-06-02 PROCEDURE — NC001 PR NO CHARGE: Performed by: OBSTETRICS & GYNECOLOGY

## 2025-06-02 NOTE — PROCEDURES
Maribel Morelos, a  at 38w1d with an IVETT of 6/15/2025, by Ultrasound, was seen at [facility] for the following procedure(s): $Procedure Type: DESEAN, NST]    Nonstress Test  Reason for NST: Decreased Fetal Movement  Variability: Moderate  Decelerations: None  Accelerations: Yes  Acoustic Stimulator: No  Baseline: 145 BPM  Uterine Irritability: No  Contractions: Not present    4 Quadrant DESEAN  DESEAN Q1 (cm): 0.8 cm  DESEAN Q2 (cm): 1.9 cm  DESEAN Q3 (cm): 4.2 cm  DESEAN Q4 (cm): 2.9 cm  DESEAN TOTAL (cm): 9.8 cm  LVP (cm): 4.2 cm              Interpretation  Nonstress Test Interpretation: Reactive  Overall Impression: Reassuring

## 2025-06-02 NOTE — PATIENT INSTRUCTIONS
Thank you for choosing us for your  care today.  If you have any questions about your ultrasound or care, please do not hesitate to contact us or your primary obstetrician.        Some general instructions for your pregnancy are:    Exercise: Aim for 150 minutes per week of regular exercise.  Walking is great!  Nutrition: Choose healthy sources of calcium, iron, and protein.  Avoid ultraprocessed foods and added sugar.  Learn about Preeclampsia: preeclampsia is a common, potentially serious high blood pressure complication in pregnancy.  A blood pressure of 140mmHg (systolic or top number) or 90mmHg (diastolic or bottom number) should be evaluated by your doctor.  Aspirin is sometimes prescribed in early pregnancy to prevent preeclampsia in women with risk factors - ask your obstetrician if you should be on this medication.  For more resources, visit:  https://[email or web address].[email or web address]  If you smoke, please try to quit completely but also try to reduce your smoking by as much as possible (as soon as possible).  Do not vape.  Please also avoid cannabis products.  Other warning signs to watch out for in pregnancy or postpartum: chest pain, obstructed breathing or shortness of breath, seizures, thoughts of hurting yourself or your baby, bleeding, a painful or swollen leg, fever, or headache (see [facility]).  If these happen call 911.  Itching is also not normal in pregnancy and if you experience this, especially over your hands and feet, potentially worse at night, notify your doctors.     Kick Counts in Pregnancy   AMBULATORY CARE:   Kick counts  measure how much your baby is moving in your womb. A kick from your baby can be felt as a twist, turn, swish, roll, or jab. It is common to feel your baby kicking at 26 to 28 weeks of pregnancy. You may feel your baby kick as early as 20 weeks of pregnancy. You may want to start counting at 28 weeks.   Contact your  doctor immediately if:   You feel a change in the number of kicks or movements of your baby.      You feel fewer than 10 kicks within 2 hours.      You have questions or concerns about your baby's movements.     Why measure kick counts:  Your baby's movement may provide information about your baby's health. He or she may move less, or not at all, if there are problems. Your baby may move less if he or she is not getting enough oxygen or nutrition from the placenta. Do not smoke while you are pregnant. Smoking decreases the amount of oxygen that gets to your baby. Talk to your healthcare provider if you need help to quit smoking. Tell your healthcare provider as soon as you feel a change in your baby's movements.  When to measure kick counts:   Measure kick counts at the same time every day.       Measure kick counts when your baby is awake and most active. Your baby may be most active in the evening.     How to measure kick counts:  Check that your baby is awake before you measure kick counts. You can wake up your baby by lightly pushing on your belly, walking, or drinking something cold. Your healthcare provider may tell you different ways to measure kick counts. You may be told to do the following:  Use a chart or clock to keep track of the time you start and finish counting.      Sit in a chair or lie on your left side.      Place your hands on the largest part of your belly.      Count until you reach 10 kicks. Write down how much time it takes to count 10 kicks.      It may take 30 minutes to 2 hours to count 10 kicks. It should not take more than 2 hours to count 10 kicks.     Follow up with your doctor as directed:  Write down your questions so you remember to ask them during your visits.   © Copyright [employer] 2023 Information is for End User's use only and may not be sold, redistributed or otherwise used for commercial purposes.  The above information is an  only. It is not intended as  medical advice for individual conditions or treatments. Talk to your doctor, nurse or pharmacist before following any medical regimen to see if it is safe and effective for you.

## 2025-06-02 NOTE — H&P
H&P - OB/GYN   Name: Maribel Morelos 30 y.o. female I MRN: [id number]  Unit/Bed#:  TRIAGE 2 I Date of Admission: 2025   Date of Service: 2025 I Hospital Day: 0     Assessment & Plan  Decreased fetal movement affecting management of pregnancy in third trimester  Fetal status reassuring  Reviewed kick counts  F/u growth ultrasound tomorrow      History of Present Illness   Patient of: [facility]  Brief Summary: Maribel Morelos  with an IVETT of 6/15/2025, by Ultrasound at 38w1d presenting DFM.    She reports baby not moving as much yesterday evening and this morning. She is feeling movement now.     Chief Complaint: DFM    HPI:  Contractions: None.   Leakage of fluid: None.   Bleeding: None.   Fetal movement: decreased  .    Pregnancy complications: GDMA1    Review of Systems   Constitutional:  Negative for chills and fever.   HENT:  Negative for ear pain and sore throat.    Eyes:  Negative for pain and visual disturbance.   Respiratory:  Negative for cough and shortness of breath.    Cardiovascular:  Negative for chest pain and palpitations.   Gastrointestinal:  Negative for abdominal pain and vomiting.   Genitourinary:  Negative for dysuria and hematuria.   Musculoskeletal:  Negative for arthralgias and back pain.   Skin:  Negative for color change and rash.   Neurological:  Negative for seizures and syncope.   All other systems reviewed and are negative.      Historical Information   Historical Information   Past Medical History[1]  Past Surgical History[2]  Social History[3]  E-Cigarette/Vaping    E-Cigarette Use Never User      E-Cigarette/Vaping Substances    Nicotine No     THC No     CBD No     Flavoring No     Other No     Unknown No      Family history non-contributory  OB History    Para Term  AB Living   1 0 0 0 0 0   SAB IAB Ectopic Multiple Live Births   0 0 0 0 0      # Outcome Date GA Lbr Braeden/2nd Weight Sex Type Anes PTL Lv   1 Current      "         Baby complications/comments: None    Objective :  Temp:  [97.7 °F (36.5 °C)-97.9 °F (36.6 °C)] 97.9 °F (36.6 °C)  HR:  [114-116] 116  BP: (117-135)/(70-91) 117/70  Resp:  [18-20] 18  SpO2:  [100 %] 100 %  O2 Device: None (Room air)    Physical Exam  Vitals and nursing note reviewed.   Constitutional:       General: She is not in acute distress.     Appearance: She is well-developed.   HENT:      Head: Normocephalic and atraumatic.     Eyes:      Conjunctiva/sclera: Conjunctivae normal.     Pulmonary:      Effort: Pulmonary effort is normal. No respiratory distress.   Abdominal:      Palpations: Abdomen is soft.      Tenderness: There is no abdominal tenderness.     Musculoskeletal:         General: No swelling.      Cervical back: Neck supple.     Skin:     General: Skin is warm and dry.      Capillary Refill: Capillary refill takes less than 2 seconds.     Neurological:      Mental Status: She is alert.     Psychiatric:         Mood and Affect: Mood normal.        Cervical Exam   Deferred  Contractions:  Contraction Duration (seconds): n/a  Fetal heart rate  Baseline Rate (FHR): 145 bpm  Variability: Moderate  Accelerations: 15 x 15 or greater  Decelerations: None    Lab Results: I have reviewed the following results:  Strep Grp B PCR   Date Value Ref Range Status   05/22/2025 Positive (A) Negative Final     Comment:         05/22/2025 Positive for Beta Hemolytic Strep Grp B by PCR (A)  Final   05/22/2025 Streptococcus agalactiae (Group B) (A)  Final     No results found for: \"STREPGPB\"  No results found for: \"RPR\"  Hepatitis B Surface Ag   Date Value Ref Range Status   12/03/2024 Non-reactive Non-Reactive Final     No components found for: \"EXTHEPBSAG\"  Hepatitis C Ab   Date Value Ref Range Status   12/03/2024 Non-reactive Non-Reactive Final     Rubella IgG Quant   Date Value Ref Range Status   12/03/2024 26.3 >14.9 IU/mL Final     No results found for: \"EXTRUBELIGGQ\"  No results found for: \"HIVAGAB\"  No " "components found for: \"CXQJRN4VK\"  N gonorrhoeae, DNA Probe   Date Value Ref Range Status   12/04/2024 Negative Negative Final     Chlamydia trachomatis, DNA Probe   Date Value Ref Range Status   12/04/2024 Negative Negative Final       Type & Screen:  ABO Grouping   Date Value Ref Range Status   12/03/2024 A  Final     Rh Factor   Date Value Ref Range Status   12/03/2024 Negative  Final     No results found for: \"ANTIBODYSCR\"  Specimen Expiration Date   Date Value Ref Range Status   12/03/2024 20241206  Final          [1]   Past Medical History:  Diagnosis Date    Allergic     Anxiety     never medicated    Headache(784.0)     Migraine     Palpitations     seen by cardiologist - holter monitor negative    Polycystic ovary syndrome     dx 2024    Strep throat 08/11/2021    Urinary tract infection     Varicella     had vaccine   [2]   Past Surgical History:  Procedure Laterality Date    FOOT SURGERY Left     Bunion   [3]   Social History  Tobacco Use    Smoking status: Never     Passive exposure: Never    Smokeless tobacco: Never   Vaping Use    Vaping status: Never Used   Substance and Sexual Activity    Alcohol use: Not Currently     Comment: socially    Drug use: No    Sexual activity: Yes     Partners: Male     Birth control/protection: Condom Male     "

## 2025-06-03 ENCOUNTER — ULTRASOUND (OUTPATIENT)
Dept: PERINATAL CARE | Facility: OTHER | Age: 30
End: 2025-06-03
Attending: OBSTETRICS & GYNECOLOGY
Payer: COMMERCIAL

## 2025-06-03 VITALS
DIASTOLIC BLOOD PRESSURE: 84 MMHG | BODY MASS INDEX: 41.66 KG/M2 | HEART RATE: 111 BPM | SYSTOLIC BLOOD PRESSURE: 122 MMHG | HEIGHT: 64 IN | WEIGHT: 244 LBS

## 2025-06-03 DIAGNOSIS — Z3A.38 38 WEEKS GESTATION OF PREGNANCY: Primary | ICD-10-CM

## 2025-06-03 DIAGNOSIS — Z3A.38 38 WEEKS GESTATION OF PREGNANCY: ICD-10-CM

## 2025-06-03 PROCEDURE — 76816 OB US FOLLOW-UP PER FETUS: CPT | Performed by: OBSTETRICS & GYNECOLOGY

## 2025-06-03 PROCEDURE — NC001 PR NO CHARGE: Performed by: OBSTETRICS & GYNECOLOGY

## 2025-06-04 ENCOUNTER — HOSPITAL ENCOUNTER (INPATIENT)
Facility: HOSPITAL | Age: 30
LOS: 6 days | Discharge: HOME | End: 2025-06-10
Attending: OBSTETRICS & GYNECOLOGY | Admitting: OBSTETRICS & GYNECOLOGY
Payer: COMMERCIAL

## 2025-06-04 ENCOUNTER — NURSE TRIAGE (OUTPATIENT)
Age: 30
End: 2025-06-04

## 2025-06-04 DIAGNOSIS — Z98.891 S/P PRIMARY LOW TRANSVERSE C-SECTION: ICD-10-CM

## 2025-06-04 DIAGNOSIS — O13.9 GESTATIONAL HYPERTENSION: Primary | ICD-10-CM

## 2025-06-04 LAB
ABO GROUP BLD: NORMAL
ALBUMIN SERPL BCG-MCNC: 3.5 G/DL (ref 3.5–5)
ALP SERPL-CCNC: 132 U/L (ref 34–104)
ALT SERPL W P-5'-P-CCNC: 13 U/L (ref 7–52)
ANION GAP SERPL CALCULATED.3IONS-SCNC: 8 MMOL/L (ref 4–13)
AST SERPL W P-5'-P-CCNC: 16 U/L (ref 13–39)
BACTERIA UR QL AUTO: ABNORMAL /HPF
BASOPHILS # BLD AUTO: 0.03 THOUSANDS/ÂΜL (ref 0–0.1)
BASOPHILS NFR BLD AUTO: 0 % (ref 0–1)
BILIRUB SERPL-MCNC: 0.43 MG/DL (ref 0.2–1)
BILIRUB UR QL STRIP: NEGATIVE
BLD GP AB SCN SERPL QL: POSITIVE
BLOOD GROUP ANTIBODIES SERPL: NORMAL
BUN SERPL-MCNC: 4 MG/DL (ref 5–25)
CALCIUM SERPL-MCNC: 8.9 MG/DL (ref 8.4–10.2)
CHLORIDE SERPL-SCNC: 105 MMOL/L (ref 96–108)
CLARITY UR: CLEAR
CO2 SERPL-SCNC: 22 MMOL/L (ref 21–32)
COLOR UR: YELLOW
CREAT SERPL-MCNC: 0.45 MG/DL (ref 0.6–1.3)
CREAT UR-MCNC: 129.1 MG/DL
EOSINOPHIL # BLD AUTO: 0.04 THOUSAND/ÂΜL (ref 0–0.61)
EOSINOPHIL NFR BLD AUTO: 0 % (ref 0–6)
ERYTHROCYTE [DISTWIDTH] IN BLOOD BY AUTOMATED COUNT: 13.2 % (ref 11.6–15.1)
GFR SERPL CREATININE-BSD FRML MDRD: 134 ML/MIN/1.73SQ M
GLUCOSE SERPL-MCNC: 86 MG/DL (ref 65–140)
GLUCOSE SERPL-MCNC: 95 MG/DL (ref 65–140)
GLUCOSE SERPL-MCNC: 97 MG/DL (ref 65–140)
GLUCOSE UR STRIP-MCNC: NEGATIVE MG/DL
HCT VFR BLD AUTO: 39.6 % (ref 34.8–46.1)
HGB BLD-MCNC: 13.2 G/DL (ref 11.5–15.4)
HGB UR QL STRIP.AUTO: NEGATIVE
HYALINE CASTS #/AREA URNS LPF: ABNORMAL /LPF
IMM GRANULOCYTES # BLD AUTO: 0.2 THOUSAND/UL (ref 0–0.2)
IMM GRANULOCYTES NFR BLD AUTO: 2 % (ref 0–2)
KETONES UR STRIP-MCNC: ABNORMAL MG/DL
LEUKOCYTE ESTERASE UR QL STRIP: ABNORMAL
LYMPHOCYTES # BLD AUTO: 2.09 THOUSANDS/ÂΜL (ref 0.6–4.47)
LYMPHOCYTES NFR BLD AUTO: 16 % (ref 14–44)
MCH RBC QN AUTO: 28.7 PG (ref 26.8–34.3)
MCHC RBC AUTO-ENTMCNC: 33.3 G/DL (ref 31.4–37.4)
MCV RBC AUTO: 86 FL (ref 82–98)
MONOCYTES # BLD AUTO: 0.76 THOUSAND/ÂΜL (ref 0.17–1.22)
MONOCYTES NFR BLD AUTO: 6 % (ref 4–12)
MUCOUS THREADS UR QL AUTO: ABNORMAL
NEUTROPHILS # BLD AUTO: 9.65 THOUSANDS/ÂΜL (ref 1.85–7.62)
NEUTS SEG NFR BLD AUTO: 76 % (ref 43–75)
NITRITE UR QL STRIP: NEGATIVE
NON-SQ EPI CELLS URNS QL MICRO: ABNORMAL /HPF
NRBC BLD AUTO-RTO: 0 /100 WBCS
PH UR STRIP.AUTO: 6 [PH]
PLATELET # BLD AUTO: 248 THOUSANDS/UL (ref 149–390)
PMV BLD AUTO: 10.5 FL (ref 8.9–12.7)
POTASSIUM SERPL-SCNC: 3.8 MMOL/L (ref 3.5–5.3)
PROT SERPL-MCNC: 6.6 G/DL (ref 6.4–8.4)
PROT UR STRIP-MCNC: NEGATIVE MG/DL
PROT UR-MCNC: 22.6 MG/DL
PROT/CREAT UR: 0.2 MG/G{CREAT}
RBC # BLD AUTO: 4.6 MILLION/UL (ref 3.81–5.12)
RBC #/AREA URNS AUTO: ABNORMAL /HPF
RH BLD: NEGATIVE
SODIUM SERPL-SCNC: 135 MMOL/L (ref 135–147)
SP GR UR STRIP.AUTO: 1.02 (ref 1–1.03)
SPECIMEN EXPIRATION DATE: NORMAL
UROBILINOGEN UR STRIP-ACNC: <2 MG/DL
WBC # BLD AUTO: 12.77 THOUSAND/UL (ref 4.31–10.16)
WBC #/AREA URNS AUTO: ABNORMAL /HPF

## 2025-06-04 PROCEDURE — 80053 COMPREHEN METABOLIC PANEL: CPT | Performed by: OBSTETRICS & GYNECOLOGY

## 2025-06-04 PROCEDURE — 86850 RBC ANTIBODY SCREEN: CPT | Performed by: OBSTETRICS & GYNECOLOGY

## 2025-06-04 PROCEDURE — G0463 HOSPITAL OUTPT CLINIC VISIT: HCPCS

## 2025-06-04 PROCEDURE — NC001 PR NO CHARGE: Performed by: OBSTETRICS & GYNECOLOGY

## 2025-06-04 PROCEDURE — 86870 RBC ANTIBODY IDENTIFICATION: CPT | Performed by: OBSTETRICS & GYNECOLOGY

## 2025-06-04 PROCEDURE — 86900 BLOOD TYPING SEROLOGIC ABO: CPT | Performed by: OBSTETRICS & GYNECOLOGY

## 2025-06-04 PROCEDURE — 86780 TREPONEMA PALLIDUM: CPT | Performed by: OBSTETRICS & GYNECOLOGY

## 2025-06-04 PROCEDURE — 82948 REAGENT STRIP/BLOOD GLUCOSE: CPT

## 2025-06-04 PROCEDURE — 86901 BLOOD TYPING SEROLOGIC RH(D): CPT | Performed by: OBSTETRICS & GYNECOLOGY

## 2025-06-04 PROCEDURE — 85025 COMPLETE CBC W/AUTO DIFF WBC: CPT | Performed by: OBSTETRICS & GYNECOLOGY

## 2025-06-04 PROCEDURE — 99213 OFFICE O/P EST LOW 20 MIN: CPT

## 2025-06-04 PROCEDURE — 82570 ASSAY OF URINE CREATININE: CPT | Performed by: OBSTETRICS & GYNECOLOGY

## 2025-06-04 PROCEDURE — 81001 URINALYSIS AUTO W/SCOPE: CPT | Performed by: OBSTETRICS & GYNECOLOGY

## 2025-06-04 PROCEDURE — 84156 ASSAY OF PROTEIN URINE: CPT | Performed by: OBSTETRICS & GYNECOLOGY

## 2025-06-04 RX ORDER — ACETAMINOPHEN 325 MG/1
975 TABLET ORAL ONCE
Status: COMPLETED | OUTPATIENT
Start: 2025-06-04 | End: 2025-06-04

## 2025-06-04 RX ORDER — CEFAZOLIN SODIUM 2 G/50ML
2000 SOLUTION INTRAVENOUS ONCE
Status: COMPLETED | OUTPATIENT
Start: 2025-06-04 | End: 2025-06-04

## 2025-06-04 RX ORDER — CEFAZOLIN SODIUM 1 G/50ML
1000 SOLUTION INTRAVENOUS EVERY 8 HOURS
Status: DISCONTINUED | OUTPATIENT
Start: 2025-06-05 | End: 2025-06-07

## 2025-06-04 RX ORDER — SODIUM CHLORIDE, SODIUM LACTATE, POTASSIUM CHLORIDE, CALCIUM CHLORIDE 600; 310; 30; 20 MG/100ML; MG/100ML; MG/100ML; MG/100ML
125 INJECTION, SOLUTION INTRAVENOUS CONTINUOUS
Status: DISPENSED | OUTPATIENT
Start: 2025-06-04 | End: 2025-06-06

## 2025-06-04 RX ORDER — BUPIVACAINE HYDROCHLORIDE 2.5 MG/ML
30 INJECTION, SOLUTION EPIDURAL; INFILTRATION; INTRACAUDAL; PERINEURAL ONCE AS NEEDED
Status: DISCONTINUED | OUTPATIENT
Start: 2025-06-04 | End: 2025-06-07

## 2025-06-04 RX ORDER — BUTORPHANOL TARTRATE 1 MG/ML
1 INJECTION, SOLUTION INTRAMUSCULAR; INTRAVENOUS
Status: DISCONTINUED | OUTPATIENT
Start: 2025-06-04 | End: 2025-06-06

## 2025-06-04 RX ORDER — ONDANSETRON 2 MG/ML
4 INJECTION INTRAMUSCULAR; INTRAVENOUS EVERY 6 HOURS PRN
Status: DISCONTINUED | OUTPATIENT
Start: 2025-06-04 | End: 2025-06-06

## 2025-06-04 RX ADMIN — SODIUM CHLORIDE, SODIUM LACTATE, POTASSIUM CHLORIDE, AND CALCIUM CHLORIDE 125 ML/HR: .6; .31; .03; .02 INJECTION, SOLUTION INTRAVENOUS at 12:51

## 2025-06-04 RX ADMIN — Medication 50 MCG: at 20:27

## 2025-06-04 RX ADMIN — Medication 50 MCG: at 16:35

## 2025-06-04 RX ADMIN — CEFAZOLIN SODIUM 2000 MG: 2 SOLUTION INTRAVENOUS at 16:35

## 2025-06-04 RX ADMIN — Medication 50 MCG: at 23:30

## 2025-06-04 RX ADMIN — Medication 50 MCG: at 12:49

## 2025-06-04 RX ADMIN — ACETAMINOPHEN 975 MG: 325 TABLET, FILM COATED ORAL at 10:50

## 2025-06-04 NOTE — OB LABOR/OXYTOCIN SAFETY PROGRESS
Oxytocin Safety Progress Check Note - Maribel Morelos 30 y.o. female MRN: [id number]    Unit/Bed#: -01 Encounter: [id number]       Contraction Frequency (minutes): 2-6  Contraction Intensity: Mild  Uterine Activity Characteristics: Irritability  Cervical Dilation: Closed        Cervical Effacement: 0  Fetal Station: Ballotable  Baseline Rate (FHR): 145 bpm  Fetal Heart Rate (FHT): 129 BPM  FHR Category: 1               Vital Signs:   Vitals:    06/04/25 1730   BP:    Pulse: 88   Resp:    Temp:    SpO2: 98%       Notes/comments:       Exam deferred as pt not feeling her occasional ctx  Another cytotec ordered    Lucrecia Robertson DO 6/4/2025 6:57 PM

## 2025-06-04 NOTE — H&P
OB H&P  Maribel Morelos  1995  [birth year]          30 y.o. yo  at 38 weeks by us and lmp      Assessment:/Plan:     IUP at 38 weeks.   GBS positive, rash to pcn so low risk   Will begin ancef with active labor  Gest HTN   Just dx now   Discussed options for cervical ripening   Cervix closed high postierior- will begin with cytotec  GDMA1   Will check BS fasting and 2 hr PP while eating during ripening   Previously LGA but most recent US 3611 g/77%ile    Chief complaint:  Here for elevated BP at work.  Here not to have second elevated BP- mild range.  Now qualifies as gest HTN    HPI:  Contractions:  no  Fetal movement:  no  Vaginal bleeding:   no  Leaking of fluid:  no      Pregnancy Complications:  Problem List[1]      Past Medical History:  Past Medical History[2]    Past Surgical History:  Past Surgical History[3]    Social Hx:  Social History     Socioeconomic History    Marital status: /Civil Union     Spouse name: Not on file    Number of children: Not on file    Years of education: Not on file    Highest education level: Master's degree (e.g., MA, MS, Val, MEd, MSW, VAIBHAV)   Occupational History    Not on file   Tobacco Use    Smoking status: Never     Passive exposure: Never    Smokeless tobacco: Never   Vaping Use    Vaping status: Never Used   Substance and Sexual Activity    Alcohol use: Not Currently     Comment: socially    Drug use: No    Sexual activity: Yes     Partners: Male     Birth control/protection: Condom Male   Other Topics Concern    Not on file   Social History Narrative    Caffeine use     Social Drivers of Health     Financial Resource Strain: Not on file   Food Insecurity: No Food Insecurity (2024)    Nursing - Inadequate Food Risk Classification     Worried About Running Out of Food in the Last Year: Never true     Ran Out of Food in the Last Year: Never true     Ran Out of Food in the Last Year: Not on file   Transportation Needs: No  "Transportation Needs (11/25/2024)    PRAPARE - Transportation     Lack of Transportation (Medical): No     Lack of Transportation (Non-Medical): No   Physical Activity: Not on file   Stress: Not on file   Social Connections: Not on file   Intimate Partner Violence: Not on file   Housing Stability: Low Risk  (11/25/2024)    Housing Stability Vital Sign     Unable to Pay for Housing in the Last Year: No     Number of Times Moved in the Last Year: 0     Homeless in the Last Year: No       Meds:  Medications Ordered Prior to Encounter[4]    Allergies:  Allergies[5]    RoS/    Constitutional: Negative    CV: Negative    Pulm: Negative    GI: Negative    Urinary: Negative    Neuro: Negative    Musculoskeletal: Negative    Obstetric History:    G 1    Labs:  Strep Grp B PCR   Date Value Ref Range Status   05/22/2025 Positive (A) Negative Final     Comment:         05/22/2025 Positive for Beta Hemolytic Strep Grp B by PCR (A)  Final   05/22/2025 Streptococcus agalactiae (Group B) (A)  Final     Type & Screen:  ABO Grouping   Date Value Ref Range Status   12/03/2024 A  Final      Rh Factor   Date Value Ref Range Status   12/03/2024 Negative  Final    No results found for: \"ANTIBODYSCR\"    Specimen Expiration Date   Date Value Ref Range Status   12/03/2024 20241206  Final     No results found for: \"HIVAGAB\"  Hepatitis B Surface Ag   Date Value Ref Range Status   12/03/2024 Non-reactive Non-Reactive Final     No results found for: \"RPR\"  Rubella IgG Quant   Date Value Ref Range Status   12/03/2024 26.3 >14.9 IU/mL Final     Glucose   Date Value Ref Range Status   12/03/2024 151 (H) 70 - 134 mg/dL Final     Comment:     <=134 Normal   135-179 Impaired glucose fasting. Perform 3 Hour Glucose Tolerance   >=180 Diagnosis Gestational Diabetes Mellitus             Physical Exam:      Vital signs: 129/90  104  16  afebrile     Alert, comfortable, no acute distress      Neuro:        Alert, appropriate affect, no gross deficits        " Normal speech        CN2-12 intact and symmetric        DTR 3+ and symmetric        Muscle strength 5/5 and symmetric    Regular rate and rhythm    Clear to auscultation bilaterally    Abdomen soft, nontender, nondistended.    Fundus nontender, size consistent with dates      Cephalic  Cervix: long thick high posterior -4  FHR: cat 1  CTXN: none  Membranes: intact  Pelvis adequate  EFW 7 lb 15 oz by US yesterday           [1]   Patient Active Problem List  Diagnosis    Chronic migraine without aura, not intractable    Hx of penicillin allergy    Anxiety    Chronic migraine without aura    Low vitamin D level    Palpitations    PCOS (polycystic ovarian syndrome)    Elevated BP without diagnosis of hypertension    Obesity complicating pregnancy, third trimester    Diet controlled gestational diabetes mellitus (GDM) in third trimester [O24.410]    Fetal macrosomia during pregnancy in third trimester    Decreased fetal movement affecting management of pregnancy in third trimester   [2]   Past Medical History:  Diagnosis Date    Allergic     Anxiety     never medicated    Headache(784.0)     Migraine     Palpitations     seen by cardiologist - holter monitor negative    Polycystic ovary syndrome     dx 2024    Strep throat 08/11/2021    Urinary tract infection     Varicella     had vaccine   [3]   Past Surgical History:  Procedure Laterality Date    FOOT SURGERY Left     Bunion   [4]   No current facility-administered medications on file prior to encounter.     Current Outpatient Medications on File Prior to Encounter   Medication Sig Dispense Refill    Blood Glucose Monitoring Suppl (OneTouch Verio Reflect) w/Device KIT Use as directed      nystatin (MYCOSTATIN) ointment Apply topically 2 (two) times a day 60 g 3    OneTouch Delica Lancets 33G MISC Use 4 a Day or as instructed 100 each 5    OneTouch Verio test strip Test 4 Times Daily or as instructed 100 strip 5    Prenatal Vit-Fe Fumarate-FA (PRENATAL PO) Take 1  tablet by mouth in the morning      ketoconazole (NIZORAL) 2 % shampoo Daily for 2 weeks then once a month. Apply on damp skin, leave on for 5mins then rinse. 120 mL 3   [5]   Allergies  Allergen Reactions    Dexamethasone Rash    Penicillins Rash

## 2025-06-04 NOTE — TELEPHONE ENCOUNTER
"REASON FOR CONVERSATION: Headache    SYMPTOMS: 38w3d,  Hypertension 142/100 @ 8:45a.  Has had on/off headaches and abdominal pain.  Had a headache this morning but did go away on it's own.  Does have swelling of hands/feet but not worse than normal.  Denies any current pain or vision changes, leaking of fluid, vaginal bleeding.  +fetal movement.     OTHER HEALTH INFORMATION: None    PROTOCOL DISPOSITION: Go to LD Now, Discuss with Provider and Call Back Patient (overriding See HPC Within 4 Hours (Or PCP Triage))    CARE ADVICE PROVIDED: Please go to L&D Now.  Please call back if you develop any abdominal pain, headache, vaginal bleeding, leaking of fluid or decreased fetal movement. Pt verbalized understanding, no further questions or concerns at this time.   ESC to on call provider and L&D Charge RN.       PRACTICE FOLLOW-UP: None            Reason for Disposition   [1] Pregnant 20 or more weeks AND [2] Systolic BP >= 140 OR Diastolic >= 90    Answer Assessment - Initial Assessment Questions  1. LOCATION: \"Where does it hurt?\"       Denies currently  2. ONSET: \"When did the headache start?\" (e.g., minutes, hours or days)       Currently doesn't have one, but did have it earlier today.  Did not have to take anything.   3. PATTERN: \"Does the pain come and go, or has it been constant since it started?\"      Comes and goes  4. SEVERITY: \"How bad is the pain?\" and \"What does it keep you from doing?\"       Currently none  5. RECURRENT SYMPTOM: \"Have you ever had headaches before?\" If Yes, ask: \"When was the last time?\" and \"What happened that time?\"       Yes Mon-tues  6. CAUSE: \"What do you think is causing the headache?\"      Hx of migraines  7. MIGRAINE: \"Have you been diagnosed with migraine headaches?\" If Yes, ask: \"Is this headache similar?\"       Yes   8. HEAD INJURY: \"Has there been any recent injury to the head?\"       denies  9. OTHER SYMPTOMS: \"Do you have any other symptoms?\" (e.g., abdomen pain, blurred " "vision, fever, stiff neck; swelling of hands, face, or feet)      Hands and feet but not worse than normal.  Abdominal pain comes/goes, denies currently   10. PREGNANCY: \"How many weeks pregnant are you?\"        38w3d  11. IVETT: \"What date are you expecting to deliver?\"        6/15/25    Answer Assessment - Initial Assessment Questions  1. BLOOD PRESSURE: \"What is your blood pressure?\" \"Did you take at least two measurements 5 minutes apart?\"      142/100  2. ONSET: \"When did you take your blood pressure?\"      8:45a  3. HOW: \"How did you take your blood pressure?\" (e.g., automatic home BP monitor, visiting nurse)      Manual by school nurse  4. HISTORY: \"Do you have a history of high blood pressure?\" \"Were you diagnosed with preeclampsia during this or previous pregnancies?\"      Denies  5. MEDICINES: \"Are you taking any medicines for blood pressure?\" \"Have you missed any doses recently?\"      Denies  8. OTHER SYMPTOMS: \"Do you have any other symptoms?\" (e.g., abdomen pain, chest pain, difficulty breathing, hand or face swelling, headache, vision changes)      Hand/feet swelling.    Protocols used: Pregnancy - Headache-Adult-OH, Pregnancy - High Blood Pressure-Adult-AH    "

## 2025-06-04 NOTE — LETTER
[facility] LABOR AND DELIVERY  [address] [address]  Dept: [phone]    Myrna 10, 2025     Patient: Maribel Morelos   YOB: 1995   Date of Visit: 6/4/2025       To Whom it May Concern:    Maribel Morelos is under my professional care. Her , Danyel Romero, has been in the hospital supporting her throughout her recovery from 6/4/2025 through 6/102025 following the delivery of their new baby boy. We appreciate your understanding of importance of this support.    If you have any questions or concerns, please don't hesitate to call.         Sincerely,          Ana Merino MD

## 2025-06-05 LAB
GLUCOSE SERPL-MCNC: 78 MG/DL (ref 65–140)
GLUCOSE SERPL-MCNC: 80 MG/DL (ref 65–140)
GLUCOSE SERPL-MCNC: 83 MG/DL (ref 65–140)
GLUCOSE SERPL-MCNC: 85 MG/DL (ref 65–140)
GLUCOSE SERPL-MCNC: 90 MG/DL (ref 65–140)
GLUCOSE SERPL-MCNC: 96 MG/DL (ref 65–140)
TREPONEMA PALLIDUM IGG+IGM AB [PRESENCE] IN SERUM OR PLASMA BY IMMUNOASSAY: NORMAL

## 2025-06-05 PROCEDURE — 82948 REAGENT STRIP/BLOOD GLUCOSE: CPT

## 2025-06-05 RX ORDER — OXYTOCIN/0.9 % SODIUM CHLORIDE 30/500 ML
1-30 PLASTIC BAG, INJECTION (ML) INTRAVENOUS
Status: DISCONTINUED | OUTPATIENT
Start: 2025-06-05 | End: 2025-06-07

## 2025-06-05 RX ADMIN — CEFAZOLIN SODIUM 1000 MG: 1 SOLUTION INTRAVENOUS at 15:37

## 2025-06-05 RX ADMIN — Medication 25 MCG: at 08:38

## 2025-06-05 RX ADMIN — BUTORPHANOL TARTRATE 1 MG: 1 INJECTION, SOLUTION INTRAMUSCULAR; INTRAVENOUS at 12:44

## 2025-06-05 RX ADMIN — Medication 50 MCG: at 03:49

## 2025-06-05 RX ADMIN — SODIUM CHLORIDE, SODIUM LACTATE, POTASSIUM CHLORIDE, AND CALCIUM CHLORIDE 125 ML/HR: .6; .31; .03; .02 INJECTION, SOLUTION INTRAVENOUS at 23:57

## 2025-06-05 RX ADMIN — SODIUM CHLORIDE, SODIUM LACTATE, POTASSIUM CHLORIDE, AND CALCIUM CHLORIDE 125 ML/HR: .6; .31; .03; .02 INJECTION, SOLUTION INTRAVENOUS at 15:02

## 2025-06-05 RX ADMIN — Medication 2 MILLI-UNITS/MIN: at 15:30

## 2025-06-05 NOTE — OB LABOR/OXYTOCIN SAFETY PROGRESS
Labor Progress Note - Maribel Morelos 30 y.o. female MRN: [id number]    Unit/Bed#: -01 Encounter: [id number]       Contraction Frequency (minutes): 2-4  Contraction Intensity: Mild  Uterine Activity Characteristics: Irregular  Cervical Dilation: Closed        Cervical Effacement: 0  Fetal Station: Ballotable  Baseline Rate (FHR): 130 bpm  Fetal Heart Rate (FHT): 140 BPM  FHR Category: 1               Vital Signs:   Vitals:    06/04/25 2200   BP: 115/76   Pulse: (!) 107   Resp:    Temp:    SpO2:        Notes/comments:   Late Entry   Pelvis not amenable to breaux balloon at this time.     Additional cytotec ordered.  Will consider attempt at breaux balloon with speculum after 1-2 additional doses.   Georgia Stephen MD 6/5/2025 12:13 AM

## 2025-06-05 NOTE — OB LABOR/OXYTOCIN SAFETY PROGRESS
Oxytocin Safety Progress Check Note - Maribel Morelos 30 y.o. female MRN: [id number]    Unit/Bed#: -01 Encounter: [id number]       Contraction Frequency (minutes): 2-3  Contraction Intensity: Mild  Uterine Activity Characteristics: Irregular  Cervical Dilation: Fingertip        Cervical Effacement: 40  Fetal Station: Ballotable  Baseline Rate (FHR): 130 bpm  Fetal Heart Rate (FHT): 140 BPM  FHR Category: 1             Vital Signs:   Vitals:    25 0707   BP: 128/79   Pulse: 74   Resp: 18   Temp: (!) 97.4 °F (36.3 °C)   SpO2: 100%     Notes/comments:       30 year old  here for IOL for gHTN     Labor: S/p 5 doses PO Cytotec and 1 dose of vaginal Cytotec placed with this examination. Plan for FB placement with next exam   FHT Cat I   GBS positive on Ancef   Pain: Epidural PRN   GDMA1, continue BG monitoring in latent labor   Anxiety, mood stable, no medications   Migraines, denies symptoms   gHTN, BP,s well controlled, PIH labs stable, continue to monitor closely   Previously LGA but most recent US 3611 g/77%ile      Alonzo Sullivan MD 2025 8:40 AM

## 2025-06-05 NOTE — PLAN OF CARE
Problem: PAIN - ADULT  Goal: Verbalizes/displays adequate comfort level or baseline comfort level  Description: Interventions:  - Encourage patient to monitor pain and request assistance  - Assess pain using appropriate pain scale  - Administer analgesics as ordered based on type and severity of pain and evaluate response  - Implement non-pharmacological measures as appropriate and evaluate response  - Consider cultural and social influences on pain and pain management  - Notify physician/advanced practitioner if interventions unsuccessful or patient reports new pain  - Educate patient/family on pain management process including their role and importance of  reporting pain   - Provide non-pharmacologic/complimentary pain relief interventions  Outcome: Progressing     Problem: INFECTION - ADULT  Goal: Absence or prevention of progression during hospitalization  Description: INTERVENTIONS:  - Assess and monitor for signs and symptoms of infection  - Monitor lab/diagnostic results  - Monitor all insertion sites, i.e. indwelling lines, tubes, and drains  - Monitor endotracheal if appropriate and nasal secretions for changes in amount and color  - Bascom appropriate cooling/warming therapies per order  - Administer medications as ordered  - Instruct and encourage patient and family to use good hand hygiene technique  - Identify and instruct in appropriate isolation precautions for identified infection/condition  Outcome: Progressing  Goal: Absence of fever/infection during neutropenic period  Description: INTERVENTIONS:  - Monitor WBC  - Perform strict hand hygiene  - Limit to healthy visitors only  - No plants, dried, fresh or silk flowers with michael in patient room  Outcome: Progressing     Problem: SAFETY ADULT  Goal: Patient will remain free of falls  Description: INTERVENTIONS:  - Educate patient/family on patient safety including physical limitations  - Instruct patient to call for assistance with activity   -  Consider consulting OT/PT to assist with strengthening/mobility based on AM PAC & JH-HLM score  - Consult OT/PT to assist with strengthening/mobility   - Keep Call bell within reach  - Keep bed low and locked with side rails adjusted as appropriate  - Keep care items and personal belongings within reach  - Initiate and maintain comfort rounds  - Make Fall Risk Sign visible to staff    - Apply yellow socks and bracelet for high fall risk patients  - Consider moving patient to room near nurses station  Outcome: Progressing  Goal: Maintain or return to baseline ADL function  Description: INTERVENTIONS:  -  Assess patient's ability to carry out ADLs; assess patient's baseline for ADL function and identify physical deficits which impact ability to perform ADLs (bathing, care of mouth/teeth, toileting, grooming, dressing, etc.)  - Assess/evaluate cause of self-care deficits   - Assess range of motion  - Assess patient's mobility; develop plan if impaired  - Assess patient's need for assistive devices and provide as appropriate  - Encourage maximum independence but intervene and supervise when necessary  - Involve family in performance of ADLs  - Assess for home care needs following discharge   - Consider OT consult to assist with ADL evaluation and planning for discharge  - Provide patient education as appropriate  - Monitor functional capacity and physical performance, use of AM PAC & JH-HLM   - Monitor gait, balance and fatigue with ambulation    Outcome: Progressing  Goal: Maintains/Returns to pre admission functional level  Description: INTERVENTIONS:  - Perform AM-PAC 6 Click Basic Mobility/ Daily Activity assessment daily.  - Set and communicate daily mobility goal to care team and patient/family/caregiver.   - Collaborate with rehabilitation services on mobility goals if consulted    - Out of bed for toileting  - Record patient progress and toleration of activity level   Outcome: Progressing     Problem: DISCHARGE  PLANNING  Goal: Discharge to home or other facility with appropriate resources  Description: INTERVENTIONS:  - Identify barriers to discharge w/patient and caregiver  - Arrange for needed discharge resources and transportation as appropriate  - Identify discharge learning needs (meds, wound care, etc.)  - Arrange for interpretive services to assist at discharge as needed  - Refer to Case Management Department for coordinating discharge planning if the patient needs post-hospital services based on physician/advanced practitioner order or complex needs related to functional status, cognitive ability, or social support system  Outcome: Progressing     Problem: Knowledge Deficit  Goal: Patient/family/caregiver demonstrates understanding of disease process, treatment plan, medications, and discharge instructions  Description: Complete learning assessment and assess knowledge base.  Interventions:  - Provide teaching at level of understanding  - Provide teaching via preferred learning methods  Outcome: Progressing  Goal: Verbalizes understanding of labor plan  Description: Assess patient/family/caregiver's baseline knowledge level and ability to understand information.  Provide education via patient/family/caregiver's preferred learning method at appropriate level of understanding.     1. Provide teaching at level of understanding.  2. Provide teaching via preferred learning method(s).  Outcome: Progressing     Problem: Labor & Delivery  Goal: Manages discomfort  Description: Assess and monitor for signs and symptoms of discomfort.  Assess patient's pain level regularly and per hospital policy.  Administer medications as ordered. Support use of nonpharmacological methods to help control pain such as distraction, imagery, relaxation, and application of heat and cold.  Collaborate with interdisciplinary team and patient to determine appropriate pain management plan.    1. Include patient in decisions related to comfort.  2.  Offer non-pharmacological pain management interventions.  3. Report ineffective pain management to physician.  Outcome: Progressing  Goal: Patient vital signs are stable  Description: 1. Assess vital signs - vaginal delivery.  Outcome: Progressing

## 2025-06-05 NOTE — OB LABOR/OXYTOCIN SAFETY PROGRESS
Oxytocin Safety Progress Check Note - Maribel Morelos 30 y.o. female MRN: [id number]    Unit/Bed#: -01 Encounter: [id number]       Contraction Frequency (minutes): 2-4  Contraction Intensity: Mild  Uterine Activity Characteristics: Occasional  Cervical Dilation: Closed        Cervical Effacement: 0  Fetal Station: Ballotable  Baseline Rate (FHR): 130 bpm  Fetal Heart Rate (FHT): 139 BPM  FHR Category: 1               Vital Signs:   Vitals:    06/04/25 2200   BP: 115/76   Pulse: (!) 107   Resp:    Temp:    SpO2:        Notes/comments:       Cervix a little more anterior, softer, still closed and high and posterior  Will order another cytotec. Last given 1130 PM    Lucrecia Robertson DO 6/5/2025 3:42 AM

## 2025-06-05 NOTE — OB LABOR/OXYTOCIN SAFETY PROGRESS
Oxytocin Safety Progress Check Note - Maribel Morelos 30 y.o. female MRN: [id number]    Unit/Bed#: -01 Encounter: [id number]       Contraction Frequency (minutes): 3-6  Contraction Intensity: Mild/Moderate  Uterine Activity Characteristics: Irregular  Cervical Dilation: 3        Cervical Effacement: 40  Fetal Station: Ballotable  Baseline Rate (FHR): 140 bpm  Fetal Heart Rate (FHT): 130 BPM  FHR Category: 1             Vital Signs:   Vitals:    25 1433   BP:    Pulse: 93   Resp:    Temp:    SpO2:      Notes/comments:       30 year old  here for IOL for gHTN     Labor: S/p 5 doses PO Cytotec and 1 dose of vaginal Cytotec. S/p FB expelled around 1500. Will initiate pitocin for augmentation and titrate per protocol. Next SVE in 4-6 hours or PRN for maternal/fetal indications   FHT Cat I   GBS positive on Ancef   Pain: Epidural PRN   GDMA1, continue BG monitoring  Anxiety, mood stable, no medications   Migraines, denies symptoms   gHTN, BP,s well controlled, PIH labs stable, continue to monitor closely   Previously LGA but most recent US 3611 g/77%ile      Alonzo Sullivan MD 2025 3:12 PM

## 2025-06-05 NOTE — OB LABOR/OXYTOCIN SAFETY PROGRESS
Oxytocin Safety Progress Check Note - Maribel Morelos 30 y.o. female MRN: [id number]    Unit/Bed#: -01 Encounter: [id number]       Contraction Frequency (minutes): 2-6  Contraction Intensity: Mild  Uterine Activity Characteristics: Irritability  Cervical Dilation: Closed        Cervical Effacement: 0  Fetal Station: Ballotable  Baseline Rate (FHR): 145 bpm  Fetal Heart Rate (FHT): 138 BPM  FHR Category: 1               Vital Signs:   Vitals:    06/04/25 1730   BP:    Pulse: 88   Resp:    Temp:    SpO2: 98%       Notes/comments:       \  Pt feeling crampy  Still very high and posterior  Will use another cytotec now    Lucrecia Robertson DO 6/4/2025 8:17 PM

## 2025-06-06 ENCOUNTER — ANESTHESIA (INPATIENT)
Dept: LABOR AND DELIVERY | Facility: HOSPITAL | Age: 30
End: 2025-06-06
Payer: COMMERCIAL

## 2025-06-06 ENCOUNTER — ANESTHESIA EVENT (INPATIENT)
Dept: LABOR AND DELIVERY | Facility: HOSPITAL | Age: 30
End: 2025-06-06
Payer: COMMERCIAL

## 2025-06-06 DIAGNOSIS — Z13.1 DIABETES MELLITUS SCREENING: Primary | ICD-10-CM

## 2025-06-06 DIAGNOSIS — Z86.32 HISTORY OF GESTATIONAL DIABETES MELLITUS (GDM), NOT CURRENTLY PREGNANT: ICD-10-CM

## 2025-06-06 LAB
BASE EXCESS BLDCOA CALC-SCNC: -5.6 MMOL/L (ref 3–11)
BASE EXCESS BLDCOV CALC-SCNC: -5.8 MMOL/L (ref 1–9)
GLUCOSE SERPL-MCNC: 103 MG/DL (ref 65–140)
GLUCOSE SERPL-MCNC: 104 MG/DL (ref 65–140)
GLUCOSE SERPL-MCNC: 72 MG/DL (ref 65–140)
GLUCOSE SERPL-MCNC: 77 MG/DL (ref 65–140)
GLUCOSE SERPL-MCNC: 81 MG/DL (ref 65–140)
GLUCOSE SERPL-MCNC: 85 MG/DL (ref 65–140)
GLUCOSE SERPL-MCNC: 86 MG/DL (ref 65–140)
GLUCOSE SERPL-MCNC: 87 MG/DL (ref 65–140)
GLUCOSE SERPL-MCNC: 88 MG/DL (ref 65–140)
GLUCOSE SERPL-MCNC: 93 MG/DL (ref 65–140)
GLUCOSE SERPL-MCNC: 93 MG/DL (ref 65–140)
GLUCOSE SERPL-MCNC: 95 MG/DL (ref 65–140)
GLUCOSE SERPL-MCNC: 96 MG/DL (ref 65–140)
HCO3 BLDCOA-SCNC: 22 MMOL/L (ref 17.3–27.3)
HCO3 BLDCOV-SCNC: 19.7 MMOL/L (ref 12.2–28.6)
O2 CT VFR BLDCOA CALC: 2.3 ML/DL
OXYHGB MFR BLDCOA: 11.4 %
OXYHGB MFR BLDCOV: 46.2 %
PCO2 BLDCOA: 51.5 MM[HG] (ref 30–60)
PCO2 BLDCOV: 38.9 MM HG (ref 27–43)
PH BLDCOA: 7.25 [PH] (ref 7.23–7.43)
PH BLDCOV: 7.32 [PH] (ref 7.19–7.49)
PO2 BLDCOA: 10.1 MM HG (ref 5–25)
PO2 BLDCOV: 21.5 MM HG (ref 15–45)
SAO2 % BLDCOV: 9.8 ML/DL

## 2025-06-06 PROCEDURE — 0UB60ZZ EXCISION OF LEFT FALLOPIAN TUBE, OPEN APPROACH: ICD-10-PCS | Performed by: OBSTETRICS & GYNECOLOGY

## 2025-06-06 PROCEDURE — 82948 REAGENT STRIP/BLOOD GLUCOSE: CPT

## 2025-06-06 PROCEDURE — 82805 BLOOD GASES W/O2 SATURATION: CPT | Performed by: OBSTETRICS & GYNECOLOGY

## 2025-06-06 PROCEDURE — 88307 TISSUE EXAM BY PATHOLOGIST: CPT | Performed by: STUDENT IN AN ORGANIZED HEALTH CARE EDUCATION/TRAINING PROGRAM

## 2025-06-06 RX ORDER — MORPHINE SULFATE 0.5 MG/ML
INJECTION, SOLUTION EPIDURAL; INTRATHECAL; INTRAVENOUS AS NEEDED
Status: DISCONTINUED | OUTPATIENT
Start: 2025-06-06 | End: 2025-06-06

## 2025-06-06 RX ORDER — EPHEDRINE SULFATE 50 MG/ML
INJECTION INTRAVENOUS AS NEEDED
Status: DISCONTINUED | OUTPATIENT
Start: 2025-06-06 | End: 2025-06-06

## 2025-06-06 RX ORDER — HYDROMORPHONE HCL/PF 1 MG/ML
0.5 SYRINGE (ML) INJECTION
Status: DISCONTINUED | OUTPATIENT
Start: 2025-06-06 | End: 2025-06-07

## 2025-06-06 RX ORDER — LIDOCAINE HYDROCHLORIDE AND EPINEPHRINE 15; 5 MG/ML; UG/ML
INJECTION, SOLUTION EPIDURAL
Status: COMPLETED | OUTPATIENT
Start: 2025-06-06 | End: 2025-06-06

## 2025-06-06 RX ORDER — CEFAZOLIN SODIUM 1 G/3ML
INJECTION, POWDER, FOR SOLUTION INTRAMUSCULAR; INTRAVENOUS AS NEEDED
Status: DISCONTINUED | OUTPATIENT
Start: 2025-06-06 | End: 2025-06-06

## 2025-06-06 RX ORDER — ONDANSETRON 2 MG/ML
4 INJECTION INTRAMUSCULAR; INTRAVENOUS EVERY 6 HOURS PRN
Status: ACTIVE | OUTPATIENT
Start: 2025-06-06 | End: 2025-06-07

## 2025-06-06 RX ORDER — HYDROMORPHONE HCL/PF 1 MG/ML
0.5 SYRINGE (ML) INJECTION EVERY 2 HOUR PRN
Refills: 0 | Status: ACTIVE | OUTPATIENT
Start: 2025-06-06 | End: 2025-06-07

## 2025-06-06 RX ORDER — KETOROLAC TROMETHAMINE 30 MG/ML
INJECTION, SOLUTION INTRAMUSCULAR; INTRAVENOUS AS NEEDED
Status: DISCONTINUED | OUTPATIENT
Start: 2025-06-06 | End: 2025-06-06

## 2025-06-06 RX ORDER — ONDANSETRON 2 MG/ML
INJECTION INTRAMUSCULAR; INTRAVENOUS AS NEEDED
Status: DISCONTINUED | OUTPATIENT
Start: 2025-06-06 | End: 2025-06-06

## 2025-06-06 RX ORDER — ACETAMINOPHEN 325 MG/1
650 TABLET ORAL EVERY 6 HOURS SCHEDULED
Status: DISPENSED | OUTPATIENT
Start: 2025-06-07 | End: 2025-06-07

## 2025-06-06 RX ORDER — SODIUM CHLORIDE, SODIUM LACTATE, POTASSIUM CHLORIDE, CALCIUM CHLORIDE 600; 310; 30; 20 MG/100ML; MG/100ML; MG/100ML; MG/100ML
INJECTION, SOLUTION INTRAVENOUS CONTINUOUS PRN
Status: DISCONTINUED | OUTPATIENT
Start: 2025-06-06 | End: 2025-06-06

## 2025-06-06 RX ORDER — PROMETHAZINE HYDROCHLORIDE 25 MG/ML
12.5 INJECTION, SOLUTION INTRAMUSCULAR; INTRAVENOUS
Status: DISCONTINUED | OUTPATIENT
Start: 2025-06-06 | End: 2025-06-07

## 2025-06-06 RX ORDER — NALBUPHINE HYDROCHLORIDE 10 MG/ML
5 INJECTION INTRAMUSCULAR; INTRAVENOUS; SUBCUTANEOUS
Status: DISPENSED | OUTPATIENT
Start: 2025-06-06 | End: 2025-06-07

## 2025-06-06 RX ORDER — OXYCODONE HYDROCHLORIDE 5 MG/1
5 TABLET ORAL EVERY 4 HOURS PRN
Refills: 0 | Status: ACTIVE | OUTPATIENT
Start: 2025-06-06 | End: 2025-06-07

## 2025-06-06 RX ORDER — CITRIC ACID/SODIUM CITRATE 334-500MG
30 SOLUTION, ORAL ORAL ONCE
Status: COMPLETED | OUTPATIENT
Start: 2025-06-06 | End: 2025-06-06

## 2025-06-06 RX ORDER — OXYTOCIN/0.9 % SODIUM CHLORIDE 30/500 ML
PLASTIC BAG, INJECTION (ML) INTRAVENOUS CONTINUOUS PRN
Status: DISCONTINUED | OUTPATIENT
Start: 2025-06-06 | End: 2025-06-06

## 2025-06-06 RX ORDER — LIDOCAINE HYDROCHLORIDE AND EPINEPHRINE 20; 5 MG/ML; UG/ML
INJECTION, SOLUTION EPIDURAL; INFILTRATION; INTRACAUDAL; PERINEURAL AS NEEDED
Status: DISCONTINUED | OUTPATIENT
Start: 2025-06-06 | End: 2025-06-06

## 2025-06-06 RX ADMIN — CEFAZOLIN SODIUM 1000 MG: 1 SOLUTION INTRAVENOUS at 00:01

## 2025-06-06 RX ADMIN — SODIUM CITRATE AND CITRIC ACID MONOHYDRATE 30 ML: 500; 334 SOLUTION ORAL at 21:46

## 2025-06-06 RX ADMIN — KETOROLAC TROMETHAMINE 30 MG: 30 INJECTION, SOLUTION INTRAMUSCULAR; INTRAVENOUS at 23:11

## 2025-06-06 RX ADMIN — EPHEDRINE SULFATE 10 MG: 50 INJECTION INTRAVENOUS at 23:21

## 2025-06-06 RX ADMIN — CEFAZOLIN SODIUM 1000 MG: 1 SOLUTION INTRAVENOUS at 07:55

## 2025-06-06 RX ADMIN — CEFAZOLIN SODIUM 1000 MG: 1 SOLUTION INTRAVENOUS at 16:00

## 2025-06-06 RX ADMIN — LIDOCAINE HYDROCHLORIDE,EPINEPHRINE BITARTRATE 5 ML: 15; .005 INJECTION, SOLUTION EPIDURAL; INFILTRATION; INTRACAUDAL; PERINEURAL at 10:36

## 2025-06-06 RX ADMIN — MORPHINE SULFATE 3 MG: 0.5 INJECTION, SOLUTION EPIDURAL; INTRATHECAL; INTRAVENOUS at 22:47

## 2025-06-06 RX ADMIN — Medication 250 MILLI-UNITS/MIN: at 22:46

## 2025-06-06 RX ADMIN — PHENYLEPHRINE HYDROCHLORIDE 30 MCG/MIN: 10 INJECTION, SOLUTION INTRAVENOUS at 22:09

## 2025-06-06 RX ADMIN — LIDOCAINE HYDROCHLORIDE,EPINEPHRINE BITARTRATE 5 ML: 20; .005 INJECTION, SOLUTION EPIDURAL; INFILTRATION; INTRACAUDAL; PERINEURAL at 22:09

## 2025-06-06 RX ADMIN — Medication 500 MG: at 22:12

## 2025-06-06 RX ADMIN — SODIUM CHLORIDE, SODIUM LACTATE, POTASSIUM CHLORIDE, AND CALCIUM CHLORIDE: .6; .31; .03; .02 INJECTION, SOLUTION INTRAVENOUS at 22:00

## 2025-06-06 RX ADMIN — SODIUM CHLORIDE, SODIUM LACTATE, POTASSIUM CHLORIDE, AND CALCIUM CHLORIDE: .6; .31; .03; .02 INJECTION, SOLUTION INTRAVENOUS at 22:31

## 2025-06-06 RX ADMIN — Medication 30 MILLI-UNITS/MIN: at 17:00

## 2025-06-06 RX ADMIN — SODIUM CHLORIDE, SODIUM LACTATE, POTASSIUM CHLORIDE, AND CALCIUM CHLORIDE 125 ML/HR: .6; .31; .03; .02 INJECTION, SOLUTION INTRAVENOUS at 07:12

## 2025-06-06 RX ADMIN — CEFAZOLIN 1000 MG: 1 INJECTION, POWDER, FOR SOLUTION INTRAMUSCULAR; INTRAVENOUS at 22:15

## 2025-06-06 RX ADMIN — ONDANSETRON 4 MG: 2 INJECTION, SOLUTION INTRAMUSCULAR; INTRAVENOUS at 15:19

## 2025-06-06 RX ADMIN — LIDOCAINE HYDROCHLORIDE,EPINEPHRINE BITARTRATE 5 ML: 20; .005 INJECTION, SOLUTION EPIDURAL; INFILTRATION; INTRACAUDAL; PERINEURAL at 22:07

## 2025-06-06 RX ADMIN — ROPIVACAINE HYDROCHLORIDE: 2 INJECTION, SOLUTION EPIDURAL; INFILTRATION at 17:01

## 2025-06-06 RX ADMIN — LIDOCAINE HYDROCHLORIDE,EPINEPHRINE BITARTRATE 5 ML: 20; .005 INJECTION, SOLUTION EPIDURAL; INFILTRATION; INTRACAUDAL; PERINEURAL at 22:13

## 2025-06-06 RX ADMIN — ROPIVACAINE HYDROCHLORIDE: 2 INJECTION, SOLUTION EPIDURAL; INFILTRATION at 10:45

## 2025-06-06 RX ADMIN — ONDANSETRON 4 MG: 2 INJECTION INTRAMUSCULAR; INTRAVENOUS at 23:01

## 2025-06-06 RX ADMIN — SODIUM CHLORIDE, SODIUM LACTATE, POTASSIUM CHLORIDE, AND CALCIUM CHLORIDE: .6; .31; .03; .02 INJECTION, SOLUTION INTRAVENOUS at 23:14

## 2025-06-06 RX ADMIN — LIDOCAINE HYDROCHLORIDE,EPINEPHRINE BITARTRATE 5 ML: 20; .005 INJECTION, SOLUTION EPIDURAL; INFILTRATION; INTRACAUDAL; PERINEURAL at 22:17

## 2025-06-06 NOTE — OB LABOR/OXYTOCIN SAFETY PROGRESS
Labor Progress Note - Maribel Morelos 30 y.o. female MRN: [id number]    Unit/Bed#: -01 Encounter: [id number]    Dose (chloé-units/min) Oxytocin: 12 chloé-units/min  Contraction Frequency (minutes): 2-3  Contraction Intensity: Mild/Moderate  Uterine Activity Characteristics: Regular    Cervical Dilation: 3-4  Cervical Effacement: 50  Fetal Station: Ballotable    Baseline Rate (FHR): 130 bpm  Fetal Heart Rate (FHT): 130 BPM  FHR Category: I    Vitals:    06/06/25 0306   BP: 116/56   Pulse: 82   Resp:    Temp: 98.1 °F (36.7 °C)   SpO2:        Notes/comments:     Cervix remains quite high, with minimal change since FB out.  --> Continue oxytocin.  --> Continue to try position change, peanut ball.     Hayden De Souza MD 6/6/2025 5:57 AM

## 2025-06-06 NOTE — UTILIZATION REVIEW
Initial Clinical Review    Admission: Date/Time/Statement:   Admission Orders (From admission, onward)       Ordered        25 1211  Inpatient Admission  Once                          Orders Placed This Encounter   Procedures    Inpatient Admission     Standing Status:   Standing     Number of Occurrences:   1     Level of Care:   Med Surg [16]     Estimated length of stay:   More than 2 Midnights     Certification:   I certify that inpatient services are medically necessary for this patient for a duration of greater than two midnights. See H&P and MD Progress Notes for additional information about the patient's course of treatment.     ED Arrival Information       Patient not seen in ED                       Chief Complaint   Patient presents with    Headache    Scheduled Induction       Initial Presentation: 30 y.o. female HX gDM1  at 38 weeks IOL for gHTN  Reports elevated BP at work   EXAM  Cervix: long thick high posterior -4  FHR: cat 1  CTXN: none  Membranes: intact    Plan continuous external monitoring, IVF, IV antibx; multiple Cytotec doses, breaux balloon, IV pitocin, AROM, Epidural w/  supportive care     Date: 2025  @ 0013  Additional cytotec ordered. Will consider attempt at breaux balloon with speculum after 1-2 additional doses.   Contraction Frequency (minutes): 2-4  Contraction Intensity: Mild  Uterine Activity Characteristics: Irregular  Cervical Dilation: Closed  Cervical Effacement: 0  Fetal Station: Ballotable  FHR Category: 1     @ 0836 S/p 5 doses PO Cytotec and 1 dose of vaginal Cytotec placed with this examination. Plan for FB placement with next exam   Contraction Frequency (minutes): 2-3  Contraction Intensity: Mild  Uterine Activity Characteristics: Irregular  Cervical Dilation: Fingertip  Cervical Effacement: 40  Fetal Station: Ballotable  FHR Category: 1    1250 Breaux balloon placed    Breaux bulb out at 1453. Will initiate pitocin for augmentation. Contraction Frequency  (minutes): 3-6  Contraction Intensity: Mild/Moderate  Uterine Activity Characteristics: Irregular  Cervical Dilation: 3  Cervical Effacement: 40  Fetal Station: Ballotable  FHR Category: 1    2143 Oxytocin: 8 chloé-units/min  Contraction Frequency (minutes): 3x  Contraction Intensity: Mild/Moderate  Uterine Activity Characteristics: Regular  Cervical Dilation: 3  Cervical Effacement: 40  Fetal Station: Ballotable  FHR Category: I      DATE 6/6/2025   @ 0100 Oxytocin: 10 chloé-units/min  Contraction Frequency (minutes): 2-3  Contraction Intensity: Mild/Moderate  Uterine Activity Characteristics: Regular  Cervical Dilation: 3  Cervical Effacement: 50  Fetal Station: Ballotable  FHR Category: I     @ 0557  minimal change since FB out.  --> Continue oxytocin.  --> Continue to try position change, peanut ball.   Oxytocin: 12 chloé-units/min  Contraction Frequency (minutes): 2-3  Contraction Intensity: Mild/Moderate  Uterine Activity Characteristics: Regular  Cervical Dilation: 3-4  Cervical Effacement: 50  Fetal Station: Ballotable  FHR Category: I    0934 AROM     Epidural  6/6/2025 10:23 AM     @ 1343  IUPC   Oxytocin: 28 chloé-units/min  Contraction Frequency (minutes): 2-3  Contraction Intensity: Strong  Uterine Activity Characteristics: Regular  Cervical Dilation: 6-7  Cervical Effacement: 80  Fetal Station: -1  FHT baseline: 140bpm  Variability: moderate  Accels: absent  Decels: decelerations maybe early but difficult to see ctx  Category 2    @ 1530  Oxytocin: 30 chloé-units/min  Contraction Frequency (minutes): 2-3  Contraction Intensity: Strong  Uterine Activity Characteristics: Regular  Cervical Dilation: 10  Dilation Complete Date: 06/06/25  Dilation Complete Time: 1528  Cervical Effacement: 100  Fetal Station: 0  FHT baseline: 145bpm  Variability: moderate  Accels: present  Decels: Absent  Category 1    @ 1713  Pt effectively pushing.   Will now stay at bedside until delivery. FSE placed as pt very  uncomfortable with belly band.   Oxytocin: 30 chloé-units/min  Contraction Frequency (minutes): 2-4  Contraction Intensity: Strong  Uterine Activity Characteristics: Regular  Cervical Dilation: 10  Dilation Complete Date: 06/06/25  Dilation Complete Time: 1528  Cervical Effacement: 100  Fetal Station: 1  Baseline Rate (FHR): 145 bpm  Fetal Heart Rate (FHT): 140 BPM  FHR Category: 1    @ 2033 Narrow anterior arch.  Pt has been pushing with good effort for 3.5hrs.    Discussion for c/s delivery entertained.    @ 2133 No further fetal descent after 4hrs of pushing.  10/100/+1, with caput noted.    Discussed mode of delivery.  Pt  very exhausted and amenable to proceeding with c/s delivery.     C section for Failure to Descend, suspect CPD  6/6/2025 @ 2245  Viable male infant    APGARs of 8 and 9   weighing 7lb, 8.3oz     ED Treatment-Medication Administration - No Administrations Displayed (No Start Event Found)       None            Scheduled Medications:  cefazolin, 1,000 mg, Intravenous, Q8H      Continuous IV Infusions:  lactated ringers, 125 mL/hr, Intravenous, Continuous  oxytocin, 1-30 chloé-units/min, Intravenous, Titrated      PRN Meds:  bupivacaine (PF), 30 mL, Infiltration, Once PRN  butorphanol, 1 mg, Intravenous, Q3H PRN  ondansetron, 4 mg, Intravenous, Q6H PRN      ED Triage Vitals   Temperature Pulse Respirations Blood Pressure SpO2 Pain Score   06/04/25 1019 06/04/25 1019 06/04/25 1019 06/04/25 1028 06/04/25 1019 06/04/25 1019   (!) 97.2 °F (36.2 °C) 97 18 127/86 98 % No Pain     Weight (last 2 days)       Date/Time Weight    06/04/25 1019 111 (244)            Vital Signs (last 3 days)       Date/Time Temp Pulse Resp BP SpO2 Patient Position - Orthostatic VS Pain    06/06/25 0714 98.6 °F (37 °C) -- -- -- -- -- --    06/06/25 0603 -- 101 -- 133/87 -- -- --    06/06/25 0306 98.1 °F (36.7 °C) 82 -- 116/56 -- -- --    06/06/25 0035 -- 110 -- 120/77 -- -- --    06/05/25 2315 98.3 °F (36.8 °C) -- -- 119/81 --  -- --    06/05/25 2100 -- -- -- 124/64 -- -- --    06/05/25 2053 98.2 °F (36.8 °C) 85 -- 124/64 -- -- --    06/05/25 1743 -- 102 -- 124/77 -- -- --    06/05/25 1521 98.2 °F (36.8 °C) 101 18 123/76 98 % Sitting --    06/05/25 1433 -- 93 -- -- -- -- --    06/05/25 1429 -- 75 -- -- -- -- --    06/05/25 1425 -- 72 -- -- -- -- --    06/05/25 1421 -- 91 -- -- -- -- --    06/05/25 1417 -- 78 -- -- -- -- --    06/05/25 1413 -- 76 -- -- -- -- --    06/05/25 1305 98.2 °F (36.8 °C) 107 -- -- -- -- --    06/05/25 1301 -- 83 -- -- -- -- --    06/05/25 1257 -- 93 -- -- -- -- --    06/05/25 1253 -- 118 -- -- -- -- --    06/05/25 1244 -- -- -- -- -- -- 4    06/05/25 1119 -- 100 -- 126/84 -- -- --    06/05/25 0938 -- 102 -- 116/60 98 % -- --    06/05/25 0707 97.4 °F (36.3 °C) 74 18 128/79 100 % Sitting --    06/05/25 0505 -- 75 -- 118/65 -- -- --    06/05/25 0450 -- 88 -- 116/62 -- -- --    06/05/25 0435 -- 94 -- 111/64 -- -- --    06/05/25 0420 -- 78 -- 113/65 -- -- --    06/05/25 0405 -- 96 -- 132/81 -- -- --    06/05/25 0000 -- -- -- -- -- -- No Pain    06/04/25 2300 -- -- -- -- -- -- No Pain    06/04/25 2200 -- 107 -- 115/76 -- -- --    06/04/25 2145 -- 90 -- 123/68 -- -- --    06/04/25 2130 -- 89 -- 124/69 -- -- --    06/04/25 2115 -- 87 -- 125/76 -- -- --    06/04/25 2100 -- 94 -- 125/74 -- -- --    06/04/25 2045 98.7 °F (37.1 °C) 80 -- 127/78 -- -- --    06/04/25 1730 -- 88 -- -- 98 % -- --    06/04/25 1726 -- 88 -- 119/72 -- -- --    06/04/25 1725 -- 83 -- -- 98 % -- --    06/04/25 1720 -- 87 -- -- 97 % -- --    06/04/25 1711 -- 96 -- 124/82 -- -- --    06/04/25 1656 -- 83 -- 116/77 -- -- --    06/04/25 1640 -- 93 -- -- 97 % -- --    06/04/25 1639 98.8 °F (37.1 °C) 93 18 122/78 -- -- --    06/04/25 1505 -- 93 -- 129/88 -- -- --    06/04/25 1352 -- 87 -- 118/70 -- -- --    06/04/25 1337 -- 82 -- 123/76 -- -- --    06/04/25 1322 -- 88 -- 123/76 -- -- --    06/04/25 1307 -- 84 -- 127/82 -- -- --    06/04/25 1249 -- 85 -- 133/90  "-- -- --    06/04/25 1122 -- 104 -- 129/90 -- Lying --    06/04/25 1050 -- -- -- -- -- -- 1    06/04/25 1038 -- 96 -- 123/79 -- Lying --    06/04/25 1028 -- -- -- 127/86 -- -- --    06/04/25 1019 97.2 °F (36.2 °C) 97 18 -- 98 % Lying No Pain              Pertinent Labs/Diagnostic Test Results:   Radiology:  No orders to display     Cardiology:  No orders to display     GI:  No orders to display           Results from last 7 days   Lab Units 06/04/25  1048   WBC Thousand/uL 12.77*   HEMOGLOBIN g/dL 13.2   HEMATOCRIT % 39.6   PLATELETS Thousands/uL 248   TOTAL NEUT ABS Thousands/µL 9.65*         Results from last 7 days   Lab Units 06/04/25  1048   SODIUM mmol/L 135   POTASSIUM mmol/L 3.8   CHLORIDE mmol/L 105   CO2 mmol/L 22   ANION GAP mmol/L 8   BUN mg/dL 4*   CREATININE mg/dL 0.45*   EGFR ml/min/1.73sq m 134   CALCIUM mg/dL 8.9     Results from last 7 days   Lab Units 06/04/25  1048   AST U/L 16   ALT U/L 13   ALK PHOS U/L 132*   TOTAL PROTEIN g/dL 6.6   ALBUMIN g/dL 3.5   TOTAL BILIRUBIN mg/dL 0.43     Results from last 7 days   Lab Units 06/06/25  0608 06/06/25  0304 06/06/25  0058 06/05/25  2305 06/05/25  2053 06/05/25  1835 06/05/25  1433 06/05/25  0934 06/05/25  0642 06/04/25  2058 06/04/25  1548   POC GLUCOSE mg/dl 93 86 103 78 80 90 83 96 85 95 97     Results from last 7 days   Lab Units 06/04/25  1048   GLUCOSE RANDOM mg/dL 86             No results found for: \"BETA-HYDROXYBUTYRATE\"                 Results from last 7 days   Lab Units 06/04/25  1048   CLARITY UA  Clear   COLOR UA  Yellow   SPEC GRAV UA  1.020   PH UA  6.0   GLUCOSE UA mg/dl Negative   KETONES UA mg/dl 10 (1+)*   BLOOD UA  Negative   PROTEIN UA mg/dl Negative   NITRITE UA  Negative   BILIRUBIN UA  Negative   UROBILINOGEN UA (BE) mg/dl <2.0   LEUKOCYTES UA  Trace*   WBC UA /hpf 2-4   RBC UA /hpf None Seen   BACTERIA UA /hpf Moderate*   EPITHELIAL CELLS WET PREP /hpf Occasional   MUCUS THREADS  Moderate*   CREATININE UR mg/dL 129.1   PROTEIN " UR mg/dL 22.6   PROT/CREAT RATIO UR  0.2                                                   Past Medical History[1]  Present on Admission:  **None**      Admitting Diagnosis: No admission diagnoses are documented for this encounter.  Age/Sex: 30 y.o. female    Network Utilization Review Department  ATTENTION: Please call with any questions or concerns to [phone] and carefully listen to the prompts so that you are directed to the right person. All voicemails are confidential.   For Discharge needs, contact Care Management DC Support Team at [phone] opt. 2  Send all requests for admission clinical reviews, approved or denied determinations and any other requests to dedicated fax number below belonging to the campus where the patient is receiving treatment. List of dedicated fax numbers for the Facilities:  FACILITY NAME UR FAX NUMBER   ADMISSION DENIALS (Administrative/Medical Necessity) [phone]   DISCHARGE SUPPORT TEAM (NETWORK) [phone]   PARENT CHILD HEALTH (Maternity/NICU/Pediatrics) [phone]   [facility] [phone]   [facility] [phone]   [facility] [phone]   [facility] [phone]   [facility] [phone]   [facility] [phone]   [facility] [phone]   [facility] [phone]   [facility] [phone]   [facility] [phone]   [facility] [phone]   [facility] [phone]              [1]   Past Medical History:  Diagnosis Date    Allergic     Anxiety     never medicated    Headache(784.0)     Migraine     Palpitations     seen by cardiologist - holter monitor negative    Polycystic ovary syndrome     dx 2024    Strep throat  08/11/2021    Urinary tract infection     Varicella     had vaccine

## 2025-06-06 NOTE — ANESTHESIA PREPROCEDURE EVALUATION
Medication Therapy Management (MTM) Encounter    ASSESSMENT:                            Medication Adherence/Access: See below for considerations    Type 2 Diabetes: Patient is not at A1c goal of < 7%. Self monitoring of blood glucose is not at goal of fasting  mg/dL and post prandial < 180 mg/dL. Patient may benefit from switching insulin to GLP-1 given the glucose lowering effects she had with it last time, in addition to the cardioprotective benefits and weight loss. Given that she prefers not to prick herself frequently, she may benefit from using a CGM, pending insurance coverage. Patient is due for A1c.    Hypertension: Stable. Patient is meeting blood pressure goal of < 130/80mmHg.    Hyperlipidemia: Stable. Patient is on moderate intensity statin which is indicated based on 2019 ACC/AHA guidelines for lipid management. Patient is due for a lipid panel.    Supplements:  Stable. May be beneficial to check potassium and vitamin D to ensure appropriate supplementation.     PLAN:                            1. You are due for labs (A1c, lipid panel, BMP, vitamin D)    2. It looks like insurance prefers Trulicity, so we will try sending in a prescription for Trulicity to determine coverage. We can also try using a coupon to bring down the copay for you. You will start by injecting Trulicity 0.75 mg once weekly for 4 weeks. If you are tolerating well, you can increase to 1.5 mg once weekly thereafter.     3. Yue will send in a prescription for FreeStyle Susannah 2 CGM to test your blood sugars.     4. Make an appointment with Dr. Renee    Follow-up:  [employer] message to follow-up on GLP-1/CGM; phone visit on 4/18    SUBJECTIVE/OBJECTIVE:                          Flora Miranda is a 63 year old female called for a follow-up visit.  Today's visit is a follow-up MTM visit from 8/24/21    Reason for visit: comprehensive medication review, diabetes f/up.    Allergies/ADRs: Reviewed in chart  Past Medical History:  Procedure:  LABOR ANALGESIA    Relevant Problems   CARDIO   (+) Chronic migraine without aura   (+) Chronic migraine without aura, not intractable   (+) Gestational hypertension      NEURO/PSYCH   (+) Anxiety   (+) Chronic migraine without aura   (+) Chronic migraine without aura, not intractable      Obstetrics/Gynecology   (+) Diet controlled gestational diabetes mellitus (GDM) in third trimester [O24.410]   (+) Obesity complicating pregnancy, third trimester    30 y.o. yo  at 38 weeks by us and lmp    Latest Reference Range & Units 25 10:48   WBC 4.31 - 10.16 Thousand/uL 12.77 (H)   RBC 3.81 - 5.12 Million/uL 4.60   Hemoglobin 11.5 - 15.4 g/dL 13.2   Hematocrit 34.8 - 46.1 % 39.6   MCV 82 - 98 fL 86   MCH 26.8 - 34.3 pg 28.7   MCHC 31.4 - 37.4 g/dL 33.3   RDW 11.6 - 15.1 % 13.2   Platelet Count 149 - 390 Thousands/uL 248   (H): Data is abnormally high      Physical Exam    Airway     Mallampati score: II  TM Distance: >3 FB  Neck ROM: full      Cardiovascular      Dental   No notable dental hx     Pulmonary      Neurological      Other Findings  post-pubertal.      Anesthesia Plan  ASA Score- 3     Anesthesia Type- epidural with ASA Monitors.         Additional Monitors:     Airway Plan:            Plan Factors-Exercise tolerance (METS): >4 METS.    Chart reviewed.   Existing labs reviewed. Patient summary reviewed.    Patient is not a current smoker.              Induction-     Postoperative Plan-     Perioperative Resuscitation Plan - Level 1 - Full Code.       Informed Consent- Anesthetic plan and risks discussed with patient.  I personally reviewed this patient with the CRNA. Discussed and agreed on the Anesthesia Plan with the CRNA..      NPO Status:  No vitals data found for the desired time range.         Reviewed in chart  Tobacco: She reports that she has been smoking. She has a 22.50 pack-year smoking history. She has never used smokeless tobacco.   Tobacco Cessation Action Plan:   Information offered: Patient not interested at this time  Alcohol: not currently using  Caffeine: 1 cup coffee morning and tea/diet soda sometimes    Medication Adherence/Access: no issues reported - perfect adherence with medication regimen, besides Lantus (see below). Patient stores her medications in pillboxes.     Type 2 Diabetes:  Currently taking metformin 1000 mg twice daily and glipizide 10 mg twice daily. Patient is not experiencing side effects. She has been off the Lantus for 1 and 1/2 weeks because she ran out and would like to switch to GLP-1. Before stopping the medication, she was injecting 30 units of Lantus daily.   She was on Ozempic instead of Lantus in the past - this worked great (A1c 6.9%) but when she changed insurance it wasn't covered.  Blood sugar monitoring: rarely. Ranges (patient reported): ~220 mg/dL  Symptoms of low blood sugar? none  Symptoms of high blood sugar? none  Aspirin: Taking 81 mg daily and denies side effects  Statin: Yes: atorvastatin 20 mg daily   ACEi/ARB: Yes: lisinopril 10 mg daily.   Urine Albumin:   Lab Results   Component Value Date    UMALCR 35.71 (H) 08/10/2021      Lab Results   Component Value Date    A1C 8.0 08/10/2021     Hypertension: Current medications include lisinopril 10 mg daily.  Patient does not self-monitor blood pressure.  Patient reports no current medication side effects, including dizziness.  BP Readings from Last 3 Encounters:   12/28/21 130/78   08/10/21 112/77   04/27/21 (!) 165/87     Hyperlipidemia: Current therapy includes atorvastatin 20 mg daily.  Patient reports no significant myalgias or other side effects.  From Care Everywhere - 12/4/20:    Supplements:  Current supplements include: calcium/vitamin D 1 tablet daily, a daily multivitamin, Vitamin D daily  (dose unknown), potassium 2000 mg daily (product unknown) and Melaluca Cell Wise 1 tablet daily (contains olive extract, grape seed extract, vitamin C, carotenoids, lycopene and tocopherols).  She finds these effective and prefers to continue taking.          Potassium   Date Value Ref Range Status   04/27/2021 4.7 3.4 - 5.3 mmol/L Final       Comment:       Specimen slightly hemolyzed, potassium may be falsely elevated      No results found for: MAG  Vitamin D Deficiency Screening Results:  No results found for: VITDT     Today's Vitals: There were no vitals taken for this visit.  ----------------  I spent 20 minutes with this patient today. All changes were made via collaborative practice agreement with Nicki Renee DO. A copy of the visit note was provided to the patient's provider(s).    The patient was sent via [employer] a summary of these recommendations.     Yue Malagon, ChristinaD   Pharmaceutical Care Resident   Medication Therapy Management    The patient was seen independently by Dr. Malagon.  I have read the note and agree with the assessment and plan.  Gema Figueredo PharmD, [facility]  Medication Therapy Management Provider  Pager: [phone]      Telemedicine Visit Details  Type of service:  Telephone visit  Start Time: 8:42 AM  End Time: 9:02 AM  Originating Location (patient location): Home  Distant Location (provider location):  [facility]     Medication Therapy Recommendations  Encounter for vitamin deficiency screening    Current Medication: Nutritional Supplements (VITAMIN D BOOSTER PO)   Rationale: Medication requires monitoring - Needs additional monitoring   Recommendation: Order Lab   Status: Accepted per CPA         Type 2 diabetes mellitus without complication (H)    Current Medication: insulin glargine (LANTUS PEN) 100 UNIT/ML pen (Discontinued)   Rationale: More effective medication available - Ineffective medication - Effectiveness   Recommendation: Change Medication -  Trulicity 0.75 MG/0.5ML Sopn   Status: Accepted per CPA          Current Medication: metFORMIN (GLUCOPHAGE) 1000 MG tablet   Rationale: Medication requires monitoring - Needs additional monitoring   Recommendation: Self-Monitoring - FreeStyle Susannah 2 Sensor Misc   Status: Accepted per CPA

## 2025-06-06 NOTE — OB LABOR/OXYTOCIN SAFETY PROGRESS
Labor Progress Note - Maribel Morelos 30 y.o. female MRN: [id number]    Unit/Bed#: -01 Encounter: [id number]    Dose (chloé-units/min) Oxytocin: 28 chloé-units/min  Contraction Frequency (minutes): 2-3  Contraction Intensity: Strong  Uterine Activity Characteristics: Regular    Cervical Dilation: 6-7  Cervical Effacement: 80  Fetal Station: -1    FHT baseline: 140bpm  Variability: moderate  Accels: absent  Decels: decelerations maybe early but difficult to see ctx  Category 2        Vital Signs:   Vitals:    06/06/25 1207   BP:    Pulse:    Resp:    Temp: 98.4 °F (36.9 °C)   SpO2:        Notes/comments:     Some decelerations that may be early but difficult to see ctx.  Discussed with patient placing IUPC.  She agrees.    Placed and will continue watching FHT.      Ruby Padgett MD 6/6/2025 1:43 PM

## 2025-06-06 NOTE — OB LABOR/OXYTOCIN SAFETY PROGRESS
Labor Progress Note - Maribel Morelos 30 y.o. female MRN: [id number]    Unit/Bed#: -01 Encounter: [id number]    Dose (chloé-units/min) Oxytocin: 8 chloé-units/min  Contraction Frequency (minutes): 3x  Contraction Intensity: Mild/Moderate  Uterine Activity Characteristics: Regular  Cervical Dilation: 3        Cervical Effacement: 40  Fetal Station: Ballotable  Baseline Rate (FHR): 140 bpm  Fetal Heart Rate (FHT): 130 BPM  FHR Category: I     Vital Signs:   Vitals:    06/05/25 2100   BP: 124/64   Pulse:    Resp:    Temp:    SpO2:        Notes/comments:   Cervix unchanged.  --> Continue oxytocin.     Hayden De Souza MD 6/5/2025 9:30 PM

## 2025-06-06 NOTE — OB LABOR/OXYTOCIN SAFETY PROGRESS
Oxytocin Safety Progress Check Note - Maribel Morelos 30 y.o. female MRN: [id number]    Unit/Bed#: -01 Encounter: [id number]    Dose (chloé-units/min) Oxytocin: 24 chloé-units/min  Contraction Frequency (minutes): 1-3  Contraction Intensity: Mild  Uterine Activity Characteristics: Regular    Cervical Dilation: 4  Cervical Effacement: 80  Fetal Station: -2      FHT baseline: 140bpm  Variability: moderate  Accels: present  Decels: Absent  Category 1        Vital Signs:   Vitals:    06/06/25 0836   BP: 123/81   Pulse: 101   Resp:    Temp:    SpO2:        Notes/comments:     In to review course with patient - IOL started 6/4/2025 for gHTN.  Pregnancy complicated by GDMA1.  Prolonged cervical ripening with 6 doses of cytotec and then breaux balloon.  Pitocin started 6/5/2025 at 15:30.    BP have been mostly normal.  GB in range, no need for insulin.    Examined patient and able to AROM - did so for clear fluid after discussion.  Will continue induction with pitocin.  Epidural prn.      Ruby Padgett MD 6/6/2025 9:34 AM

## 2025-06-06 NOTE — OB LABOR/OXYTOCIN SAFETY PROGRESS
Oxytocin Safety Progress Check Note - Maribel Morelos 30 y.o. female MRN: [id number]    Unit/Bed#: -01 Encounter: [id number]    Dose (chloé-units/min) Oxytocin: 30 chloé-units/min  Contraction Frequency (minutes): 2-4  Contraction Intensity: Strong  Uterine Activity Characteristics: Regular  Cervical Dilation: 10  Dilation Complete Date: 25  Dilation Complete Time: 1528  Cervical Effacement: 100  Fetal Station: 1  Baseline Rate (FHR): 145 bpm  Fetal Heart Rate (FHT): 140 BPM  FHR Category: 1               Vital Signs: stable  Vitals:    25 1630   BP:    Pulse:    Resp:    Temp: 99.3 °F (37.4 °C)   SpO2:        Notes/comments:   SIUP @ 38.5wks  gHTN, IOL  A1DM, continue with POCs q hr  GBS pos, On Ancef  Rh neg:  for RhoGAM PP if  dictates Rh+ status  Pt effectively pushing.  Will now stay at bedside until delivery.  FSE placed as pt very uncomfortable with belly band.     hopeful.                 R Delia Youssef MD, FACOG  2025 5:17 PM

## 2025-06-06 NOTE — PROGRESS NOTES
Today's Date: 6/6/2025  Pt's Preferred Lab: None Specified  Ambulatory Order    Lab Ordered: 2hr (75GM) GTT   Reason: to screen for T2DM s/p delivery r/t H/O GDM  Time-Frame to be collected: 4-12 weeks postpartum    Patient Instructions: Fasting = Do NOT eat or drink anything except WATER for at least 8 hours before the test.    *For [facility], please call [phone] to schedule.   *To request lab be sent to alternative lab, Call: [phone] or Message Diabetes Team via Lumate Message to DUSTIN Whitten RD   Diabetes Educator  [facility]  [facility]   Dept: [phone]  Dept Fax: [phone]

## 2025-06-06 NOTE — ANESTHESIA PROCEDURE NOTES
Epidural Block    Start time: 6/6/2025 10:23 AM  Reason for block: procedure for pain  Staffing  Performed by: Tierney Richey MD  Authorized by: Tierney Richey MD    Preanesthetic Checklist  Completed: patient identified, IV checked, site marked, risks and benefits discussed, surgical consent, monitors and equipment checked, pre-op evaluation and timeout performed  Epidural  Patient position: sitting  Prep: ChloraPrep  Sedation Level: no sedation  Patient monitoring: frequent blood pressure checks, continuous pulse oximetry and heart rate  Approach: midline  Location: lumbar, L4-5  Injection technique: DAYNA saline  Needle  Needle type: Tuohy   Needle gauge: 17 G  Needle insertion depth: 8 cm  Catheter type: multi-orifice  Catheter size: 20 G  Catheter at skin depth: 15 cm  Catheter securement method: stabilization device and clear occlusive dressing  Test dose: negativelidocaine-epinephrine (XYLOCAINE-MPF/EPINEPHRINE) 1.5 %-1:200,000 injection 3 mL - Epidural   5 mL - 6/6/2025 10:36:00 AM  Assessment  Sensory level: T10  Number of attempts: 1negative aspiration for CSF, negative aspiration for heme and no paresthesia on injection  patient tolerated the procedure well with no immediate complications

## 2025-06-06 NOTE — OB LABOR/OXYTOCIN SAFETY PROGRESS
Labor Progress Note - Maribel Morelos 30 y.o. female MRN: [id number]    Unit/Bed#: -01 Encounter: [id number]    Dose (chloé-units/min) Oxytocin: 24 chloé-units/min  Contraction Frequency (minutes): 2-6  Contraction Intensity: Mild  Uterine Activity Characteristics: Irregular    Cervical Dilation: 4  Cervical Effacement: 80  Fetal Station: -2    Baseline Rate (FHR): 145 bpm  Fetal Heart Rate (FHT): 140 BPM  FHR Category: 2               Vital Signs:   Vitals:    06/06/25 1050   BP: 124/68   Pulse: 85   Resp:    Temp:    SpO2:        Notes/comments:     Comfortable with epidural.  Some late decels following epidural, improved with position change.  Continue pitocin.        Ruby Padgett MD 6/6/2025 10:56 AM

## 2025-06-06 NOTE — OB LABOR/OXYTOCIN SAFETY PROGRESS
Labor Progress Note - Maribel Morelos 30 y.o. female MRN: [id number]    Unit/Bed#: -01 Encounter: [id number]    Dose (chloé-units/min) Oxytocin: 10 chloé-units/min  Contraction Frequency (minutes): 2-3  Contraction Intensity: Mild/Moderate  Uterine Activity Characteristics: Regular  Cervical Dilation: 3        Cervical Effacement: 50  Fetal Station: Ballotable  Baseline Rate (FHR): 130 bpm  Fetal Heart Rate (FHT): 130 BPM  FHR Category: I       Vitals:    06/06/25 0035   BP: 120/77   Pulse: (!) 110   Resp:    Temp:    SpO2:        Notes/comments:   Still 3cm.  Too high to rupture membranes.  --> Continue oxytocin.  --> ARoM with descent.     Hayden De Souza MD 6/6/2025 1:01 AM

## 2025-06-06 NOTE — OB LABOR/OXYTOCIN SAFETY PROGRESS
Labor Progress Note - Maribel Morelos 30 y.o. female MRN: [id number]    Unit/Bed#: -01 Encounter: [id number]    Dose (chloé-units/min) Oxytocin: 30 chloé-units/min  Contraction Frequency (minutes): 2-3  Contraction Intensity: Strong  Uterine Activity Characteristics: Regular    Cervical Dilation: 10  Dilation Complete Date: 06/06/25  Dilation Complete Time: 1528  Cervical Effacement: 100  Fetal Station: 0    FHT baseline: 145bpm  Variability: moderate  Accels: present  Decels: Absent  Category 1        Vital Signs:   Vitals:    06/06/25 1523   BP:    Pulse:    Resp:    Temp: 98.5 °F (36.9 °C)   SpO2:        Notes/comments:     Now complete.  Feeling ctx but not urge to push.  Will labor down.       Ruby Padgett MD 6/6/2025 3:30 PM

## 2025-06-06 NOTE — OB LABOR/OXYTOCIN SAFETY PROGRESS
Labor Progress Note - Maribel Morelos 30 y.o. female MRN: [id number]    Unit/Bed#: -01 Encounter: [id number]    Dose (chloé-units/min) Oxytocin: 28 chloé-units/min  Contraction Frequency (minutes): 2-3  Contraction Intensity: Strong  Uterine Activity Characteristics: Regular    Cervical Dilation: 5-6  Cervical Effacement: 80  Fetal Station: -1    FHT baseline: 140bpm  Variability: moderate  Accels: present  Decels: Absent  Category 1      Vital Signs:   Vitals:    06/06/25 1207   BP:    Pulse:    Resp:    Temp: 98.4 °F (36.9 °C)   SpO2:        Notes/comments:       Comfortable with epidural.  Making good progress.  Continue pitocin.    Ruby Padgett MD 6/6/2025 1:04 PM

## 2025-06-06 NOTE — PLAN OF CARE
Problem: PAIN - ADULT  Goal: Verbalizes/displays adequate comfort level or baseline comfort level  Description: Interventions:  - Encourage patient to monitor pain and request assistance  - Assess pain using appropriate pain scale  - Administer analgesics as ordered based on type and severity of pain and evaluate response  - Implement non-pharmacological measures as appropriate and evaluate response  - Consider cultural and social influences on pain and pain management  - Notify physician/advanced practitioner if interventions unsuccessful or patient reports new pain  - Educate patient/family on pain management process including their role and importance of  reporting pain   - Provide non-pharmacologic/complimentary pain relief interventions  Outcome: Progressing     Problem: INFECTION - ADULT  Goal: Absence or prevention of progression during hospitalization  Description: INTERVENTIONS:  - Assess and monitor for signs and symptoms of infection  - Monitor lab/diagnostic results  - Monitor all insertion sites, i.e. indwelling lines, tubes, and drains  - Monitor endotracheal if appropriate and nasal secretions for changes in amount and color  - [place] appropriate cooling/warming therapies per order  - Administer medications as ordered  - Instruct and encourage patient and family to use good hand hygiene technique  - Identify and instruct in appropriate isolation precautions for identified infection/condition  Outcome: Progressing  Goal: Absence of fever/infection during neutropenic period  Description: INTERVENTIONS:  - Monitor WBC  - Perform strict hand hygiene  - Limit to healthy visitors only  - No plants, dried, fresh or silk flowers with michael in patient room  Outcome: Progressing     Problem: SAFETY ADULT  Goal: Patient will remain free of falls  Description: INTERVENTIONS:  - Educate patient/family on patient safety including physical limitations  - Instruct patient to call for assistance with activity   -  Consider consulting OT/PT to assist with strengthening/mobility based on AM PAC & JH-HLM score  - Consult OT/PT to assist with strengthening/mobility   - Keep Call bell within reach  - Keep bed low and locked with side rails adjusted as appropriate  - Keep care items and personal belongings within reach  - Initiate and maintain comfort rounds  - Make Fall Risk Sign visible to staff  - Apply yellow socks and bracelet for high fall risk patients  - Consider moving patient to room near nurses station  Outcome: Progressing  Goal: Maintain or return to baseline ADL function  Description: INTERVENTIONS:  -  Assess patient's ability to carry out ADLs; assess patient's baseline for ADL function and identify physical deficits which impact ability to perform ADLs (bathing, care of mouth/teeth, toileting, grooming, dressing, etc.)  - Assess/evaluate cause of self-care deficits   - Assess range of motion  - Assess patient's mobility; develop plan if impaired  - Assess patient's need for assistive devices and provide as appropriate  - Encourage maximum independence but intervene and supervise when necessary  - Involve family in performance of ADLs  - Assess for home care needs following discharge   - Consider OT consult to assist with ADL evaluation and planning for discharge  - Provide patient education as appropriate  - Monitor functional capacity and physical performance, use of AM PAC & JH-HLM   - Monitor gait, balance and fatigue with ambulation    Outcome: Progressing  Goal: Maintains/Returns to pre admission functional level  Description: INTERVENTIONS:  - Perform AM-PAC 6 Click Basic Mobility/ Daily Activity assessment daily.  - Set and communicate daily mobility goal to care team and patient/family/caregiver.   - Collaborate with rehabilitation services on mobility goals if consulted  - Out of bed for toileting  - Record patient progress and toleration of activity level   Outcome: Progressing     Problem: DISCHARGE  PLANNING  Goal: Discharge to home or other facility with appropriate resources  Description: INTERVENTIONS:  - Identify barriers to discharge w/patient and caregiver  - Arrange for needed discharge resources and transportation as appropriate  - Identify discharge learning needs (meds, wound care, etc.)  - Arrange for interpretive services to assist at discharge as needed  - Refer to Case Management Department for coordinating discharge planning if the patient needs post-hospital services based on physician/advanced practitioner order or complex needs related to functional status, cognitive ability, or social support system  Outcome: Progressing     Problem: Knowledge Deficit  Goal: Patient/family/caregiver demonstrates understanding of disease process, treatment plan, medications, and discharge instructions  Description: Complete learning assessment and assess knowledge base.  Interventions:  - Provide teaching at level of understanding  - Provide teaching via preferred learning methods  Outcome: Progressing  Goal: Verbalizes understanding of labor plan  Description: Assess patient/family/caregiver's baseline knowledge level and ability to understand information.  Provide education via patient/family/caregiver's preferred learning method at appropriate level of understanding.     1. Provide teaching at level of understanding.  2. Provide teaching via preferred learning method(s).  Outcome: Progressing     Problem: Labor & Delivery  Goal: Manages discomfort  Description: Assess and monitor for signs and symptoms of discomfort.  Assess patient's pain level regularly and per hospital policy.  Administer medications as ordered. Support use of nonpharmacological methods to help control pain such as distraction, imagery, relaxation, and application of heat and cold.  Collaborate with interdisciplinary team and patient to determine appropriate pain management plan.    1. Include patient in decisions related to comfort.  2.  Offer non-pharmacological pain management interventions.  3. Report ineffective pain management to physician.  Outcome: Progressing  Goal: Patient vital signs are stable  Description: 1. Assess vital signs - vaginal delivery.  Outcome: Progressing

## 2025-06-07 PROBLEM — Z98.891 S/P PRIMARY LOW TRANSVERSE C-SECTION: Status: ACTIVE | Noted: 2025-06-07

## 2025-06-07 LAB
ABO GROUP BLD: NORMAL
ALBUMIN SERPL BCG-MCNC: 2.7 G/DL (ref 3.5–5)
ALP SERPL-CCNC: 90 U/L (ref 34–104)
ALT SERPL W P-5'-P-CCNC: 11 U/L (ref 7–52)
ANION GAP SERPL CALCULATED.3IONS-SCNC: 6 MMOL/L (ref 4–13)
AST SERPL W P-5'-P-CCNC: 26 U/L (ref 13–39)
BILIRUB SERPL-MCNC: 0.57 MG/DL (ref 0.2–1)
BLD GP AB SCN SERPL QL: NEGATIVE
BUN SERPL-MCNC: 8 MG/DL (ref 5–25)
CALCIUM ALBUM COR SERPL-MCNC: 9.4 MG/DL (ref 8.3–10.1)
CALCIUM SERPL-MCNC: 8.4 MG/DL (ref 8.4–10.2)
CHLORIDE SERPL-SCNC: 107 MMOL/L (ref 96–108)
CO2 SERPL-SCNC: 22 MMOL/L (ref 21–32)
CREAT SERPL-MCNC: 0.49 MG/DL (ref 0.6–1.3)
ERYTHROCYTE [DISTWIDTH] IN BLOOD BY AUTOMATED COUNT: 13.6 % (ref 11.6–15.1)
FETAL CELL SCN BLD QL ROSETTE: NEGATIVE
GFR SERPL CREATININE-BSD FRML MDRD: 131 ML/MIN/1.73SQ M
GLUCOSE SERPL-MCNC: 81 MG/DL (ref 65–140)
HCT VFR BLD AUTO: 31.7 % (ref 34.8–46.1)
HGB BLD-MCNC: 10.5 G/DL (ref 11.5–15.4)
MCH RBC QN AUTO: 28.9 PG (ref 26.8–34.3)
MCHC RBC AUTO-ENTMCNC: 33.1 G/DL (ref 31.4–37.4)
MCV RBC AUTO: 87 FL (ref 82–98)
PLATELET # BLD AUTO: 227 THOUSANDS/UL (ref 149–390)
PMV BLD AUTO: 10.4 FL (ref 8.9–12.7)
POTASSIUM SERPL-SCNC: 3.8 MMOL/L (ref 3.5–5.3)
PROT SERPL-MCNC: 5.2 G/DL (ref 6.4–8.4)
RBC # BLD AUTO: 3.63 MILLION/UL (ref 3.81–5.12)
RH BLD: NEGATIVE
SODIUM SERPL-SCNC: 135 MMOL/L (ref 135–147)
WBC # BLD AUTO: 17.3 THOUSAND/UL (ref 4.31–10.16)

## 2025-06-07 PROCEDURE — 86850 RBC ANTIBODY SCREEN: CPT | Performed by: OBSTETRICS & GYNECOLOGY

## 2025-06-07 PROCEDURE — 88305 TISSUE EXAM BY PATHOLOGIST: CPT | Performed by: STUDENT IN AN ORGANIZED HEALTH CARE EDUCATION/TRAINING PROGRAM

## 2025-06-07 PROCEDURE — 86901 BLOOD TYPING SEROLOGIC RH(D): CPT | Performed by: OBSTETRICS & GYNECOLOGY

## 2025-06-07 PROCEDURE — 85461 HEMOGLOBIN FETAL: CPT | Performed by: OBSTETRICS & GYNECOLOGY

## 2025-06-07 PROCEDURE — 86900 BLOOD TYPING SEROLOGIC ABO: CPT | Performed by: OBSTETRICS & GYNECOLOGY

## 2025-06-07 PROCEDURE — 85027 COMPLETE CBC AUTOMATED: CPT | Performed by: OBSTETRICS & GYNECOLOGY

## 2025-06-07 PROCEDURE — 80053 COMPREHEN METABOLIC PANEL: CPT | Performed by: OBSTETRICS & GYNECOLOGY

## 2025-06-07 PROCEDURE — 99024 POSTOP FOLLOW-UP VISIT: CPT | Performed by: OBSTETRICS & GYNECOLOGY

## 2025-06-07 RX ORDER — OXYTOCIN/0.9 % SODIUM CHLORIDE 30/500 ML
62.5 PLASTIC BAG, INJECTION (ML) INTRAVENOUS CONTINUOUS
Status: ACTIVE | OUTPATIENT
Start: 2025-06-07 | End: 2025-06-07

## 2025-06-07 RX ORDER — DOCUSATE SODIUM 100 MG/1
100 CAPSULE, LIQUID FILLED ORAL 2 TIMES DAILY
Status: DISCONTINUED | OUTPATIENT
Start: 2025-06-07 | End: 2025-06-11 | Stop reason: HOSPADM

## 2025-06-07 RX ORDER — SODIUM CHLORIDE, SODIUM LACTATE, POTASSIUM CHLORIDE, CALCIUM CHLORIDE 600; 310; 30; 20 MG/100ML; MG/100ML; MG/100ML; MG/100ML
125 INJECTION, SOLUTION INTRAVENOUS CONTINUOUS
Status: DISCONTINUED | OUTPATIENT
Start: 2025-06-07 | End: 2025-06-10

## 2025-06-07 RX ORDER — SIMETHICONE 80 MG
80 TABLET,CHEWABLE ORAL 4 TIMES DAILY PRN
Status: DISCONTINUED | OUTPATIENT
Start: 2025-06-07 | End: 2025-06-11 | Stop reason: HOSPADM

## 2025-06-07 RX ORDER — ASCORBIC ACID 500 MG
500 TABLET ORAL 2 TIMES DAILY
Status: DISCONTINUED | OUTPATIENT
Start: 2025-06-07 | End: 2025-06-11 | Stop reason: HOSPADM

## 2025-06-07 RX ORDER — OXYCODONE HYDROCHLORIDE 5 MG/1
5 TABLET ORAL EVERY 4 HOURS PRN
Status: DISCONTINUED | OUTPATIENT
Start: 2025-06-07 | End: 2025-06-11 | Stop reason: HOSPADM

## 2025-06-07 RX ORDER — KETOROLAC TROMETHAMINE 30 MG/ML
15 INJECTION, SOLUTION INTRAMUSCULAR; INTRAVENOUS EVERY 6 HOURS
Status: DISPENSED | OUTPATIENT
Start: 2025-06-07 | End: 2025-06-08

## 2025-06-07 RX ORDER — BENZOCAINE/MENTHOL 6 MG-10 MG
LOZENGE MUCOUS MEMBRANE 4 TIMES DAILY PRN
Status: DISCONTINUED | OUTPATIENT
Start: 2025-06-07 | End: 2025-06-11 | Stop reason: HOSPADM

## 2025-06-07 RX ORDER — ENOXAPARIN SODIUM 100 MG/ML
40 INJECTION SUBCUTANEOUS DAILY
Status: DISCONTINUED | OUTPATIENT
Start: 2025-06-07 | End: 2025-06-11 | Stop reason: HOSPADM

## 2025-06-07 RX ORDER — DIPHENHYDRAMINE HYDROCHLORIDE 50 MG/ML
25 INJECTION, SOLUTION INTRAMUSCULAR; INTRAVENOUS EVERY 6 HOURS PRN
Status: DISCONTINUED | OUTPATIENT
Start: 2025-06-07 | End: 2025-06-11 | Stop reason: HOSPADM

## 2025-06-07 RX ADMIN — KETOROLAC TROMETHAMINE 15 MG: 30 INJECTION, SOLUTION INTRAMUSCULAR; INTRAVENOUS at 18:22

## 2025-06-07 RX ADMIN — DOCUSATE SODIUM 100 MG: 100 CAPSULE, LIQUID FILLED ORAL at 09:04

## 2025-06-07 RX ADMIN — KETOROLAC TROMETHAMINE 15 MG: 30 INJECTION, SOLUTION INTRAMUSCULAR; INTRAVENOUS at 12:08

## 2025-06-07 RX ADMIN — ENOXAPARIN SODIUM 40 MG: 40 INJECTION SUBCUTANEOUS at 09:04

## 2025-06-07 RX ADMIN — DOCUSATE SODIUM 100 MG: 100 CAPSULE, LIQUID FILLED ORAL at 18:20

## 2025-06-07 RX ADMIN — KETOROLAC TROMETHAMINE 15 MG: 30 INJECTION, SOLUTION INTRAMUSCULAR; INTRAVENOUS at 05:05

## 2025-06-07 RX ADMIN — SODIUM CHLORIDE, SODIUM LACTATE, POTASSIUM CHLORIDE, AND CALCIUM CHLORIDE 125 ML/HR: .6; .31; .03; .02 INJECTION, SOLUTION INTRAVENOUS at 09:52

## 2025-06-07 RX ADMIN — NALBUPHINE HYDROCHLORIDE 5 MG: 10 INJECTION INTRAMUSCULAR; INTRAVENOUS; SUBCUTANEOUS at 09:04

## 2025-06-07 RX ADMIN — Medication 62.5 MILLI-UNITS/MIN: at 00:45

## 2025-06-07 RX ADMIN — SODIUM CHLORIDE, SODIUM LACTATE, POTASSIUM CHLORIDE, AND CALCIUM CHLORIDE 500 ML: .6; .31; .03; .02 INJECTION, SOLUTION INTRAVENOUS at 03:00

## 2025-06-07 RX ADMIN — OXYCODONE HYDROCHLORIDE AND ACETAMINOPHEN 500 MG: 500 TABLET ORAL at 18:20

## 2025-06-07 RX ADMIN — ACETAMINOPHEN 650 MG: 325 TABLET, FILM COATED ORAL at 18:20

## 2025-06-07 RX ADMIN — OXYCODONE HYDROCHLORIDE AND ACETAMINOPHEN 500 MG: 500 TABLET ORAL at 09:04

## 2025-06-07 RX ADMIN — ACETAMINOPHEN 650 MG: 325 TABLET, FILM COATED ORAL at 12:08

## 2025-06-07 RX ADMIN — BENZOCAINE AND LEVOMENTHOL 1 APPLICATION: 200; 5 SPRAY TOPICAL at 01:45

## 2025-06-07 RX ADMIN — HUMAN RHO(D) IMMUNE GLOBULIN 300 MCG: 300 INJECTION, SOLUTION INTRAMUSCULAR at 18:26

## 2025-06-07 NOTE — ANESTHESIA POSTPROCEDURE EVALUATION
Post-Op Assessment Note    CV Status:  Stable  Pain Score: 0    Pain management: adequate      Post-op block assessment: site cleaned, catheter intact and no complications   Mental Status:  Alert and awake   Hydration Status:  Euvolemic   PONV Controlled:  Controlled   Airway Patency:  Patent     Post Op Vitals Reviewed: Yes    No anethesia notable event occurred.    Staff: CRNA           Last Filed PACU Vitals:  Vitals Value Taken Time   Temp 97.7    Pulse 119    /63    Resp 19    SpO2 99%

## 2025-06-07 NOTE — ANESTHESIA POSTPROCEDURE EVALUATION
Post-Op Assessment Note    CV Status:  Stable    Pain management: adequate       Mental Status:  Alert and awake   Hydration Status:  Euvolemic   PONV Controlled:  Controlled   Airway Patency:  Patent     Post Op Vitals Reviewed: Yes    No anethesia notable event occurred.    Staff: Anesthesiologist           Last Filed PACU Vitals:  Vitals Value Taken Time   Temp 98.8 °F (37.1 °C) 06/06/25 23:55   Pulse 114 06/07/25 01:02   /62 06/07/25 01:02   Resp 18 06/06/25 23:55   SpO2 97 % 06/06/25 23:55       Modified Tae:     Vitals Value Taken Time   Activity 2 06/07/25 01:02   Respiration 2 06/07/25 01:02   Circulation 2 06/07/25 01:02   Consciousness 2 06/07/25 01:02   Oxygen Saturation 2 06/07/25 01:02     Modified Tae Score: 10

## 2025-06-07 NOTE — ASSESSMENT & PLAN NOTE
Meeting appropriate milestones for recovery thus far.  Continue routine PP care.   For voiding trial today.  Ambulation encouraged.

## 2025-06-07 NOTE — OB LABOR/OXYTOCIN SAFETY PROGRESS
Oxytocin Safety Progress Check Note - Maribel Morelos 30 y.o. female MRN: [id number]    Unit/Bed#: -01 Encounter: [id number]    Dose (chloé-units/min) Oxytocin: 30 chloé-units/min  Contraction Frequency (minutes): 2-3  Contraction Intensity: Strong  Uterine Activity Characteristics: Regular  Cervical Dilation: 10  Dilation Complete Date: 06/06/25  Dilation Complete Time: 1528  Cervical Effacement: 100  Fetal Station: 1  Baseline Rate (FHR): 160 bpm  Fetal Heart Rate (FHT): 140 BPM  FHR Category: 1 with new onset late decel.                 Vital Signs: Bps labile associated with pushing.   Vitals:    06/06/25 1952   BP: 143/71   Pulse: (!) 108   Resp:    Temp: 99.8 °F (37.7 °C)   SpO2:        Notes/comments:   SIUP @ 38.5wks  gHTN, IOL  A1DM, continue with POCs q hr  GBS pos, s/p ancef x 4  Rh neg  Narrow anterior arch.  Pt has been pushing with good effort for 3.5hrs.    Discussion for c/s delivery entertained.        RIGOBERTO Youssef MD, FACOG   6/6/2025 8:37 PM

## 2025-06-07 NOTE — OP NOTE
OPERATIVE REPORT  PATIENT NAME: Larry Morelos    :  1995  MRN: [id number]  Pt Location: [facility] L&D OR ROOM 01    SURGERY DATE: 2025    Surgeons and Role:     * RIGOBERTO Youssef MD - Primary     * Ruby Padgett MD - Co-surgeon    Preop Diagnosis:  Failure to descend  Suspected CPD  gHTN  A1DM  GBS pos  Rh neg      Procedure(s) (LRB):   SECTION () (N/A)  Left paratubal cystectomy    Specimen(s):  ID Type Source Tests Collected by Time Destination   1 :  Tissue (Placenta on Hold) OB Only Placenta TISSUE EXAM OB (PLACENTA) ONLY RIGOBERTO Youssef MD 2025 2238    B :  Cord Blood Cord BLOOD GAS, VENOUS, CORD (Canceled), BLOOD GAS, ARTERIAL, CORD (Canceled) RIGOBERTO Youssef MD 2025 2241      IVF:  2400cc    Surgical QBL:  Surgical QBL (mL): 678 mL    UOP:  150cc, concentrated/dark larry yellow    Drains:  Urethral Catheter Double-lumen 16 Fr. (Active)   Number of days: 1       Anesthesia Type:   epidural    Operative Indications:  Failure to descend, CPD.       Denis Group Classification System:  No Multiple pregnancy, No Transverse or oblique lie, No Breech lie, Gestational age is > or =37 weeks, Nulliparous, Labor induced +  is EDNIS GROUP 2a    Operative Findings:  Viable male infant weighing 7lb, 8.3oz with APGARs of 8 and 9 at 1 and 5 minutes, respectively.  Normal appearing uterus, cervix, and tubes. 1x1.5cm left paratubal cyst. 3VC.  Intact placenta.     Complications:   None    Procedure and Technique:  Indications:     Larry Morelos  is a 30 y.o.   who presented for induction of labor secondary to newly diagnosed gestational hypertension.  Informed consent was obtained after all risks/benefits/alternatives were discussed with the patient and all questions, concerns, and issues were addressed to patient's satisfaction.  Pt agreed and consented to proceed with the listed procedure above.      Complications: none         Details of the  Procedure:     After informed consent was verified, patient was brought to the operating room where she was identified by her name and birthdate.  A timeout procedure in which all team members present were in agreement was performed.  The previously introduced epidural anesthesia was bolused and noted to be adequate.  A breaux catheter was previously placed, noting clear yellow urine flowing in the catheter tubing.  Vagina was prepped with soap solution and a fetal pillow was also introduced.  Bilateral SCDs were also placed and noted to be functional.  She was secured to the operating table with a lap belt.  The Bovie ground pad was placed on her thigh.  Fetal heart tones were noted to be 150s bpm.  She was then prepped and draped in normal sterile fashion and a second timeout procedure was then performed with all team members present in agreement.    No qualified surgical resident was available to assist in the case. The assistance of an additional surgeon was critical for completion of the case. The assistant surgeon provided assistance with exposure, hemostasis, delivery of the infant, tissue manipulation, and suturing. The assistant surgeon was present from the start of the procedure until fascial closure. I, the primary surgeon, was present and scrubbed throughout the entirety of the case and performed all key portions of the surgical procedure.     A Pfannenstiel skin incision was then made approximately 2 fingerbreadths above the pubic symphysis.  This incision was then carried down through the underlying fascia and the fascia was then nicked at midline.  The fascial incision was then extended laterally with Sibley scissors, bilaterally.  The superior aspect of the fascia was then grasped with Kocher clamps, tented up and the underlying rectus muscles were then dissected down carefully.  In a similar fashion the inferior aspect of the fascia was then grasped with Kocher clamps, tented up and the underlying  rectus were then dissected down.  The rectus muscles were then  at midline bluntly with hemostats.   The peritoneum was identified and entered bluntly using hemostats.  The peritoneum was then stretched laterally, manually, noting position of the bladder.  The bladder blade was then placed and to tag wet lap sponges were then placed in the gutters bilaterally.  A bladder flap was not made.   A new scalpel  was then used to perform the hysterotomy in the lower uterine segment in a layer by layer fashion,  in a low transverse fashion once the amniotic sac was identified, the fetal vertex was then brought to the hysterotomy site, noting a very well applied and asynclitic fetal vertex.  Using fundal pressure the rest of the infant's body was then delivered atraumatically without excessive pulling or tugging of the fetal head.  The nasopharynx was immediately bulb suction noting a stunned infant.  A nuchal cord was not noted.  Right occiput posterior presentation noted.  30 seconds of delayed cord clamping was performed.  Once this time had elapsed, the cord was then doubly clamped and cut, and the infant was handed off to the waiting NICU staff.  A section of the cord was then obtained for blood gases. Cord blood was also obtained.  The placenta was then manually delivered noting intact placenta.  The uterus was then exteriorized and cleared of all clot and debris.  The hysterotomy was then repaired in 2 layers with the first layer using 0-Strata fix suture in a second imbricating layer with 0-Monocryl suture.  Excellent hemostasis was noted at the hysterotomy site.  The posterior cul-de-sac was then copiously irrigated and cleared of all clot and debris.      There was a 1 x 1.5 cm distal paratubal cyst noted on the left adnexa.  This was grasped with Michelle clamps and amputated off the mesosalpinx using the Bovie.    The uterus was then replaced into the abdominal cavity and the gutters sponges were then  removed clearing the gutters of all clot and debris.  The bladder blade was then replaced.  The peritoneum was then reapproximated using 2-0 plain gut suture and the muscles were also reapproximated using 2-0 plain gut suture in 2 interrupted sutures.  The fascia was then reapproximated using 0 Vicryl suture in a running fashion.  The subcutaneous tissue was then copiously irrigated and reapproximated using 2-0 plain gut suture in 3 layers.  The skin incision was then reapproximated using 3-0 Monocryl on a Omar needle in a subcuticular fashion.  The skin incision was then cleaned.  A Mepilex dressing was then applied.  No bladder, bowel, or ureteral injuries were noted at the end of the case.      A vaginal exam was then performed, the fetal pillow was then removed, and the uterus was expressed, clearing the vagina, cervix, and lower uterine segment of any clots and debris.  All instruments, sponge, laps, and needle counts were correct x2.  The patient received Ancef antibiotics prior to onset of the procedure, for her GBS positive status.  She also received 500 mg of azithromycin antibiotics.  The patient was then transferred to PACU for further recovery from anesthesia.  The patient and infant were left in stable condition respectively.          Patient Disposition:  PACU         RIGOBERTO Youssef MD, FACOG    DATE: June 7, 2025  TIME: 12:34 AM

## 2025-06-07 NOTE — ASSESSMENT & PLAN NOTE
For 2 hour GTT 4-12wks post delivery.     Lab Results   Component Value Date    HGBA1C 5.3 04/03/2024       Recent Labs     06/06/25  1730 06/06/25  1833 06/06/25  1937 06/06/25  2132   POCGLU 87 96 93 104       Blood Sugar Average: Last 72 hrs:  (P) [id number]

## 2025-06-07 NOTE — PROGRESS NOTES
Progress Note - OB/GYN   Name: Maribel Morelos 30 y.o. female I MRN: [id number]  Unit/Bed#: -01 I Date of Admission: 2025   Date of Service: 2025 I Hospital Day: 3    Assessment & Plan  S/P primary low transverse   Meeting appropriate milestones for recovery thus far.  Continue routine PP care.   For voiding trial today.  Ambulation encouraged.   Diet controlled gestational diabetes mellitus (GDM) in third trimester [O24.410]  For 2 hour GTT 4-12wks post delivery.     Lab Results   Component Value Date    HGBA1C 5.3 2024       Recent Labs     25  1730 25  1833 25  1937 25  2132   POCGLU 87 96 93 104       Blood Sugar Average: Last 72 hrs:  (P) [id number]    Gestational hypertension  Blood pressures normal today, continue to monitor.     OB Post-Partum Progress Note  Subjective   Post delivery. Patient is doing well. Lochia WNL. Pain well controlled.     Pain: yes, cramping, improved with meds  Tolerating PO: yes  Voiding: breaux in place  Flatus: yes  BM: no  Ambulating: yes  Breastfeeding:  yes  Chest pain: no  Shortness of breath: no  Leg pain: no  Lochia: WNL    Objective :  Temp:  [97.9 °F (36.6 °C)-99.8 °F (37.7 °C)] 97.9 °F (36.6 °C)  HR:  [] 104  BP: (107-163)/(49-88) 108/58  Resp:  [18-20] 20  SpO2:  [97 %-98 %] 98 %  O2 Device: None (Room air)    Physical Exam  Vitals reviewed.   Constitutional:       General: She is not in acute distress.     Appearance: Normal appearance. She is obese.   Pulmonary:      Effort: Pulmonary effort is normal. No respiratory distress.   Abdominal:      Palpations: Abdomen is soft.      Tenderness: There is abdominal tenderness. There is no guarding or rebound.      Comments: Rash from monitors  Incision C/D/I with mepilex.      Musculoskeletal:         General: No tenderness.      Right lower leg: Edema present.      Left lower leg: Edema present.     Skin:     General: Skin is warm and dry.      Neurological:      Mental Status: She is alert.     Psychiatric:         Mood and Affect: Mood normal.         Behavior: Behavior normal.           Lab Results: I have reviewed the following results:  Lab Results   Component Value Date    WBC 17.30 (H) 06/07/2025    HGB 10.5 (L) 06/07/2025    HCT 31.7 (L) 06/07/2025    MCV 87 06/07/2025     06/07/2025

## 2025-06-07 NOTE — OB LABOR/OXYTOCIN SAFETY PROGRESS
Oxytocin Safety Progress Check Note - Maribel Morelos 30 y.o. female MRN: [id number]    Unit/Bed#: -01 Encounter: [id number]    Dose (chloé-units/min) Oxytocin: 30 chloé-units/min  Contraction Frequency (minutes): 2-3  Contraction Intensity: Strong  Uterine Activity Characteristics: Regular  Cervical Dilation: 10  Dilation Complete Date: 06/06/25  Dilation Complete Time: 1528  Cervical Effacement: 100  Fetal Station: 1  Baseline Rate (FHR): 160 bpm  Fetal Heart Rate (FHT): 140 BPM  FHR Category: 1           FSE previously placed for accurate fetal monitoring as EFM very discontinuous.     Vital Signs: 129-148/69-79  Vitals:    06/06/25 1952   BP: 143/71   Pulse: (!) 108   Resp:    Temp: 99.8 °F (37.7 °C)   SpO2:        Notes/comments:     Pt rechecked:  No further fetal descent after 4hrs of pushing.  10/100/+1, with caput noted.      Discussed mode of delivery.  Pt also very exhausted and amenable to proceeding with c/s delivery.    Anesthesia and NICU aware.      Will proceed with c/s delivery now.     Final SCE performed after last maternal effort to push:  10/100/+1, caput @ +2.  Narrow pelvic arch.            R Delia Youssef MD, FACOG   6/6/2025 9:33 PM

## 2025-06-07 NOTE — PLAN OF CARE
Problem: POSTPARTUM  Goal: Experiences normal postpartum course  Description: INTERVENTIONS:  - Monitor maternal vital signs  - Assess uterine involution and lochia  Outcome: Progressing  Goal: Appropriate maternal -  bonding  Description: INTERVENTIONS:  - Identify family support  - Assess for appropriate maternal/infant bonding   -Encourage maternal/infant bonding opportunities  - Referral to  or  as needed  Outcome: Progressing  Goal: Establishment of infant feeding pattern  Description: INTERVENTIONS:  - Assess breast/bottle feeding  - Refer to lactation as needed  Outcome: Progressing  Goal: Incision(s), wounds(s) or drain site(s) healing without S/S of infection  Description: INTERVENTIONS  - Assess and document dressing, incision, wound bed, drain sites and surrounding tissue  - Provide patient and family education  Problem: PAIN - ADULT  Goal: Verbalizes/displays adequate comfort level or baseline comfort level  Description: Interventions:  - Encourage patient to monitor pain and request assistance  - Assess pain using appropriate pain scale  - Administer analgesics as ordered based on type and severity of pain and evaluate response  - Implement non-pharmacological measures as appropriate and evaluate response  - Consider cultural and social influences on pain and pain management  - Notify physician/advanced practitioner if interventions unsuccessful or patient reports new pain  - Educate patient/family on pain management process including their role and importance of  reporting pain   - Provide non-pharmacologic/complimentary pain relief interventions  Outcome: Progressing     Problem: INFECTION - ADULT  Goal: Absence or prevention of progression during hospitalization  Description: INTERVENTIONS:  - Assess and monitor for signs and symptoms of infection  - Monitor lab/diagnostic results  - Monitor all insertion sites, i.e. indwelling lines, tubes, and drains  - Monitor  endotracheal if appropriate and nasal secretions for changes in amount and color  - [place] appropriate cooling/warming therapies per order  - Administer medications as ordered  - Instruct and encourage patient and family to use good hand hygiene technique  - Identify and instruct in appropriate isolation precautions for identified infection/condition  Outcome: Progressing     Problem: INFECTION - ADULT  Goal: Absence of fever/infection during neutropenic period  Description: INTERVENTIONS:  - Monitor WBC  - Perform strict hand hygiene  - Limit to healthy visitors only  - No plants, dried, fresh or silk flowers with michael in patient room  Outcome: Progressing     Problem: SAFETY ADULT  Goal: Patient will remain free of falls  Description: INTERVENTIONS:  - Educate patient/family on patient safety including physical limitations  - Instruct patient to call for assistance with activity   - Consider consulting OT/PT to assist with strengthening/mobility based on AM PAC & JH-HLM score  - Consult OT/PT to assist with strengthening/mobility   - Keep Call bell within reach  - Keep bed low and locked with side rails adjusted as appropriate  - Keep care items and personal belongings within reach  - Initiate and maintain comfort rounds  - Make Fall Risk Sign visible to staff  - Apply yellow socks and bracelet for high fall risk patients  - Consider moving patient to room near nurses station  Outcome: Progressing     Problem: SAFETY ADULT  Goal: Maintain or return to baseline ADL function  Description: INTERVENTIONS:  -  Assess patient's ability to carry out ADLs; assess patient's baseline for ADL function and identify physical deficits which impact ability to perform ADLs (bathing, care of mouth/teeth, toileting, grooming, dressing, etc.)  - Assess/evaluate cause of self-care deficits   - Assess range of motion  - Assess patient's mobility; develop plan if impaired  - Assess patient's need for assistive devices and provide as  appropriate  - Encourage maximum independence but intervene and supervise when necessary  - Involve family in performance of ADLs  - Assess for home care needs following discharge   - Consider OT consult to assist with ADL evaluation and planning for discharge  - Provide patient education as appropriate  - Monitor functional capacity and physical performance, use of AM PAC & JH-HLM   - Monitor gait, balance and fatigue with ambulation    Outcome: Progressing     Problem: SAFETY ADULT  Goal: Maintains/Returns to pre admission functional level  Description: INTERVENTIONS:  - Perform AM-PAC 6 Click Basic Mobility/ Daily Activity assessment daily.  - Set and communicate daily mobility goal to care team and patient/family/caregiver.   - Collaborate with rehabilitation services on mobility goals if consulted  - Out of bed for toileting  - Record patient progress and toleration of activity level   Outcome: Progressing     Problem: DISCHARGE PLANNING  Goal: Discharge to home or other facility with appropriate resources  Description: INTERVENTIONS:  - Identify barriers to discharge w/patient and caregiver  - Arrange for needed discharge resources and transportation as appropriate  - Identify discharge learning needs (meds, wound care, etc.)  - Arrange for interpretive services to assist at discharge as needed  - Refer to Case Management Department for coordinating discharge planning if the patient needs post-hospital services based on physician/advanced practitioner order or complex needs related to functional status, cognitive ability, or social support system  Outcome: Progressing     Outcome: Progressing

## 2025-06-08 PROBLEM — O26.899 RH NEGATIVE STATE IN ANTEPARTUM PERIOD: Status: ACTIVE | Noted: 2025-06-08

## 2025-06-08 PROBLEM — Z67.91 RH NEGATIVE STATE IN ANTEPARTUM PERIOD: Status: ACTIVE | Noted: 2025-06-08

## 2025-06-08 PROCEDURE — 99024 POSTOP FOLLOW-UP VISIT: CPT | Performed by: OBSTETRICS & GYNECOLOGY

## 2025-06-08 RX ORDER — IBUPROFEN 600 MG/1
600 TABLET, FILM COATED ORAL EVERY 6 HOURS PRN
Qty: 90 TABLET | Refills: 0 | Status: SHIPPED | OUTPATIENT
Start: 2025-06-08

## 2025-06-08 RX ORDER — IBUPROFEN 600 MG/1
600 TABLET, FILM COATED ORAL EVERY 6 HOURS PRN
Status: DISCONTINUED | OUTPATIENT
Start: 2025-06-08 | End: 2025-06-11 | Stop reason: HOSPADM

## 2025-06-08 RX ORDER — FUROSEMIDE 20 MG/1
20 TABLET ORAL DAILY
Status: DISCONTINUED | OUTPATIENT
Start: 2025-06-08 | End: 2025-06-11 | Stop reason: HOSPADM

## 2025-06-08 RX ORDER — NIFEDIPINE 30 MG/1
30 TABLET, EXTENDED RELEASE ORAL DAILY
Status: DISCONTINUED | OUTPATIENT
Start: 2025-06-08 | End: 2025-06-09

## 2025-06-08 RX ORDER — ACETAMINOPHEN 325 MG/1
650 TABLET ORAL EVERY 6 HOURS PRN
Status: DISCONTINUED | OUTPATIENT
Start: 2025-06-08 | End: 2025-06-11 | Stop reason: HOSPADM

## 2025-06-08 RX ORDER — OXYCODONE HYDROCHLORIDE 5 MG/1
5 TABLET ORAL EVERY 8 HOURS PRN
Qty: 5 TABLET | Refills: 0 | Status: SHIPPED | OUTPATIENT
Start: 2025-06-08 | End: 2025-06-18

## 2025-06-08 RX ORDER — NIFEDIPINE 30 MG
30 TABLET, EXTENDED RELEASE ORAL DAILY
Qty: 30 TABLET | Refills: 0 | Status: SHIPPED | OUTPATIENT
Start: 2025-06-08 | End: 2025-06-10

## 2025-06-08 RX ADMIN — FUROSEMIDE 20 MG: 20 TABLET ORAL at 13:14

## 2025-06-08 RX ADMIN — DOCUSATE SODIUM 100 MG: 100 CAPSULE, LIQUID FILLED ORAL at 17:45

## 2025-06-08 RX ADMIN — ACETAMINOPHEN 650 MG: 325 TABLET, FILM COATED ORAL at 00:17

## 2025-06-08 RX ADMIN — DOCUSATE SODIUM 100 MG: 100 CAPSULE, LIQUID FILLED ORAL at 09:34

## 2025-06-08 RX ADMIN — ACETAMINOPHEN 650 MG: 325 TABLET, FILM COATED ORAL at 05:58

## 2025-06-08 RX ADMIN — IBUPROFEN 600 MG: 600 TABLET ORAL at 15:31

## 2025-06-08 RX ADMIN — IBUPROFEN 600 MG: 600 TABLET ORAL at 09:34

## 2025-06-08 RX ADMIN — ACETAMINOPHEN 650 MG: 325 TABLET, FILM COATED ORAL at 12:35

## 2025-06-08 RX ADMIN — OXYCODONE HYDROCHLORIDE AND ACETAMINOPHEN 500 MG: 500 TABLET ORAL at 09:34

## 2025-06-08 RX ADMIN — OXYCODONE HYDROCHLORIDE AND ACETAMINOPHEN 500 MG: 500 TABLET ORAL at 17:45

## 2025-06-08 RX ADMIN — NIFEDIPINE 30 MG: 30 TABLET, FILM COATED, EXTENDED RELEASE ORAL at 15:58

## 2025-06-08 RX ADMIN — ENOXAPARIN SODIUM 40 MG: 40 INJECTION SUBCUTANEOUS at 09:34

## 2025-06-08 RX ADMIN — ACETAMINOPHEN 650 MG: 325 TABLET, FILM COATED ORAL at 19:55

## 2025-06-08 NOTE — DISCHARGE INSTR - AVS FIRST PAGE
You should take your blood pressure at home 1-2x daily.     Normal-range blood pressures in the postpartum period are less than 140 for the top number (systolic) AND less than 90 for the bottom number (diastolic).    Mildly elevated blood pressures in the postpartum period are 140-159 systolic OR  diastolic. Some mildly elevated blood pressures are expected due to your diagnosis of gestational hypertension.     If your blood pressures are persistently in the 150s systolic or 100s diastolic, you should call the office, as initiation/titration of blood pressure medication may be necessary.    Severe-range blood pressures in the postpartum period are 160 or greater systolic  or greater diastolic. You should NOT have any severely elevated blood pressures at home.     If your blood pressure is greater than 160/110, you should promptly call [facility]'s [facility] - [place] at [phone] and proceed to the hospital for evaluation for pre-eclampsia.    Symptoms of Pre-Eclampsia include headache that does not go away with Tylenol, changes in vision such as blurred vision or spots in your vision, chest pain, shortness of breath, pain in the upper right side of your abdomen, or sudden onset or worsening of swelling.    If you develop any of the above symptoms, you should promptly call [facility]'s [facility] - [place] at [phone] and present to the hospital for evaluation of pre-eclampsia.

## 2025-06-08 NOTE — ASSESSMENT & PLAN NOTE
For 2 hour GTT 4-12wks post delivery.     Lab Results   Component Value Date    HGBA1C 5.3 04/03/2024       Recent Labs     06/06/25  1730 06/06/25  1833 06/06/25  1937 06/06/25  2132   POCGLU 87 96 93 104       Blood Sugar Average: Last 72 hrs:  (P) 88

## 2025-06-08 NOTE — PLAN OF CARE
Problem: PAIN - ADULT  Goal: Verbalizes/displays adequate comfort level or baseline comfort level  Description: Interventions:  - Encourage patient to monitor pain and request assistance  - Assess pain using appropriate pain scale  - Administer analgesics as ordered based on type and severity of pain and evaluate response  - Implement non-pharmacological measures as appropriate and evaluate response  - Consider cultural and social influences on pain and pain management  - Notify physician/advanced practitioner if interventions unsuccessful or patient reports new pain  - Educate patient/family on pain management process including their role and importance of  reporting pain   - Provide non-pharmacologic/complimentary pain relief interventions  Outcome: Progressing     Problem: INFECTION - ADULT  Goal: Absence or prevention of progression during hospitalization  Description: INTERVENTIONS:  - Assess and monitor for signs and symptoms of infection  - Monitor lab/diagnostic results  - Monitor all insertion sites, i.e. indwelling lines, tubes, and drains  - Monitor endotracheal if appropriate and nasal secretions for changes in amount and color  - [place] appropriate cooling/warming therapies per order  - Administer medications as ordered  - Instruct and encourage patient and family to use good hand hygiene technique  - Identify and instruct in appropriate isolation precautions for identified infection/condition  Outcome: Progressing  Goal: Absence of fever/infection during neutropenic period  Description: INTERVENTIONS:  - Monitor WBC  - Perform strict hand hygiene  - Limit to healthy visitors only  - No plants, dried, fresh or silk flowers with michael in patient room  Outcome: Progressing     Problem: SAFETY ADULT  Goal: Patient will remain free of falls  Description: INTERVENTIONS:  - Educate patient/family on patient safety including physical limitations  - Instruct patient to call for assistance with activity   -  Consider consulting OT/PT to assist with strengthening/mobility based on AM PAC & JH-HLM score  - Consult OT/PT to assist with strengthening/mobility   - Keep Call bell within reach  - Keep bed low and locked with side rails adjusted as appropriate  - Keep care items and personal belongings within reach  - Initiate and maintain comfort rounds  - Make Fall Risk Sign visible to staff  - Apply yellow socks and bracelet for high fall risk patients  - Consider moving patient to room near nurses station  Outcome: Progressing  Goal: Maintain or return to baseline ADL function  Description: INTERVENTIONS:  -  Assess patient's ability to carry out ADLs; assess patient's baseline for ADL function and identify physical deficits which impact ability to perform ADLs (bathing, care of mouth/teeth, toileting, grooming, dressing, etc.)  - Assess/evaluate cause of self-care deficits   - Assess range of motion  - Assess patient's mobility; develop plan if impaired  - Assess patient's need for assistive devices and provide as appropriate  - Encourage maximum independence but intervene and supervise when necessary  - Involve family in performance of ADLs  - Assess for home care needs following discharge   - Consider OT consult to assist with ADL evaluation and planning for discharge  - Provide patient education as appropriate  - Monitor functional capacity and physical performance, use of AM PAC & JH-HLM   - Monitor gait, balance and fatigue with ambulation    Outcome: Progressing  Goal: Maintains/Returns to pre admission functional level  Description: INTERVENTIONS:  - Perform AM-PAC 6 Click Basic Mobility/ Daily Activity assessment daily.  - Set and communicate daily mobility goal to care team and patient/family/caregiver.   - Collaborate with rehabilitation services on mobility goals if consulted  - Out of bed for toileting  - Record patient progress and toleration of activity level   Outcome: Progressing     Problem: DISCHARGE  PLANNING  Goal: Discharge to home or other facility with appropriate resources  Description: INTERVENTIONS:  - Identify barriers to discharge w/patient and caregiver  - Arrange for needed discharge resources and transportation as appropriate  - Identify discharge learning needs (meds, wound care, etc.)  - Arrange for interpretive services to assist at discharge as needed  - Refer to Case Management Department for coordinating discharge planning if the patient needs post-hospital services based on physician/advanced practitioner order or complex needs related to functional status, cognitive ability, or social support system  Outcome: Progressing     Problem: POSTPARTUM  Goal: Experiences normal postpartum course  Description: INTERVENTIONS:  - Monitor maternal vital signs  - Assess uterine involution and lochia  Outcome: Progressing  Goal: Appropriate maternal -  bonding  Description: INTERVENTIONS:  - Identify family support  - Assess for appropriate maternal/infant bonding   -Encourage maternal/infant bonding opportunities  - Referral to  or  as needed  Outcome: Progressing  Goal: Establishment of infant feeding pattern  Description: INTERVENTIONS:  - Assess breast/bottle feeding  - Refer to lactation as needed  Outcome: Progressing  Goal: Incision(s), wounds(s) or drain site(s) healing without S/S of infection  Description: INTERVENTIONS  - Assess and document dressing, incision, wound bed, drain sites and surrounding tissue  - Provide patient and family education  Outcome: Progressing

## 2025-06-08 NOTE — ASSESSMENT & PLAN NOTE
- doing well postpartum   - routine postop care  - lovenox, ambulation and SCDs for VTE ppx  - Hgb 13.2 > 10.5

## 2025-06-08 NOTE — PROGRESS NOTES
Progress Note - OB/GYN  Post-Partum Physician Note   Maribel Morelos 30 y.o. female MRN: [id number]  Unit/Bed#: -01 Encounter: [id number]  Assessment:  30 y.o. year-old , post-op/postpartum day #2 status-post 1LTCS.    Plan:  Assessment & Plan  S/P primary low transverse   - doing well postpartum   - routine postop care  - lovenox, ambulation and SCDs for VTE ppx  - Hgb 13.2 > 10.5  Diet controlled gestational diabetes mellitus (GDM) in third trimester [O24.410]  For 2 hour GTT 4-12wks post delivery.     Lab Results   Component Value Date    HGBA1C 5.3 2024       Recent Labs     25  1730 25  1833 25  1937 25  2132   POCGLU 87 96 93 104       Blood Sugar Average: Last 72 hrs:  (P) 88    Gestational hypertension  Blood pressures normal today, continue to monitor.   - 1 week BP check in office  Rh negative state in antepartum period  - s/p rhogam 25 given baby's Rh + status    ______________________________________________  Patient is postop and postpartum day #2 following primary LTCS     Subjective:   No acute events overnight. Denies HA, visual changes, epigastric pain. Denies shortness of breath or chest pain. Ambulating and voiding without difficulty.   Good urine output. Minimal lochia.      Objective:   Vitals:    25 1500 25 1930 25 2330 25 0700   BP: 106/59 106/67 108/55 127/80   BP Location: Right arm Right arm Right arm Right arm   Pulse: 101 101 105 98   Resp: 20 18 18 18   Temp: 98.3 °F (36.8 °C) 98 °F (36.7 °C)  98.4 °F (36.9 °C)   TempSrc: Temporal Oral  Oral   SpO2: 98% 98% 96% 98%   Weight:       Height:           Intake/Output Summary (Last 24 hours) at 2025 0816  Last data filed at 2025 0400  Gross per 24 hour   Intake 947.92 ml   Output 1550 ml   Net -602.08 ml       Physical Exam:  General: AOx3, NAD  Lungs: CTAB  CV: RRR  Abdomen: soft, fundus firm below umbilicus, appropriately tender; incision  "under Mepilex dressing, no signs of drainage or erythema surrounding dressing  Extremities: nontender without edema bilaterally      Labs/Tests:   Lab Results   Component Value Date/Time    HGB 10.5 (L) 06/07/2025 09:33 AM     06/07/2025 09:33 AM    WBC 17.30 (H) 06/07/2025 09:33 AM    CREATININE 0.49 (L) 06/07/2025 09:33 AM    ALT 11 06/07/2025 09:33 AM    AST 26 06/07/2025 09:33 AM        Brief OB Lab review:  ABO Grouping   Date Value Ref Range Status   06/07/2025 A  Final      Rh Factor   Date Value Ref Range Status   06/07/2025 Negative  Final    No results found for: \"ANTIBODYSCR\"  No results found for: \"RUBM\"    MEDS:     Current Facility-Administered Medications:     acetaminophen (TYLENOL) tablet 650 mg, Q6H PRN    ascorbic acid (VITAMIN C) tablet 500 mg, BID    benzocaine-menthol-lanolin-aloe (DERMOPLAST) 20-0.5 % topical spray 1 Application, Q6H PRN    diphenhydrAMINE (BENADRYL) injection 25 mg, Q6H PRN    docusate sodium (COLACE) capsule 100 mg, BID    enoxaparin (LOVENOX) subcutaneous injection 40 mg, Daily    hydrocortisone 1 % cream, 4x Daily PRN    ibuprofen (MOTRIN) tablet 600 mg, Q6H PRN    lactated ringers infusion, Continuous, Last Rate: Stopped (06/07/25 1727)    oxyCODONE (ROXICODONE) IR tablet 5 mg, Q4H PRN    ropivacaine 0.2% PCEA, Continuous    simethicone (MYLICON) chewable tablet 80 mg, 4x Daily PRN    witch hazel-glycerin (TUCKS) topical pad 1 Pad, Q4H PRN  Invasive Devices       Peripheral Intravenous Line  Duration             Peripheral IV 06/04/25 Right;Ventral (anterior) Forearm 3 days                      Ana Merino MD  6/8/2025 8:16 AM               "

## 2025-06-08 NOTE — PLAN OF CARE
Problem: PAIN - ADULT  Goal: Verbalizes/displays adequate comfort level or baseline comfort level  Description: Interventions:  - Encourage patient to monitor pain and request assistance  - Assess pain using appropriate pain scale  - Administer analgesics as ordered based on type and severity of pain and evaluate response  - Implement non-pharmacological measures as appropriate and evaluate response  - Consider cultural and social influences on pain and pain management  - Notify physician/advanced practitioner if interventions unsuccessful or patient reports new pain  - Educate patient/family on pain management process including their role and importance of  reporting pain   - Provide non-pharmacologic/complimentary pain relief interventions  Outcome: Progressing     Problem: INFECTION - ADULT  Goal: Absence or prevention of progression during hospitalization  Description: INTERVENTIONS:  - Assess and monitor for signs and symptoms of infection  - Monitor lab/diagnostic results  - Monitor all insertion sites, i.e. indwelling lines, tubes, and drains  - Monitor endotracheal if appropriate and nasal secretions for changes in amount and color  - [place] appropriate cooling/warming therapies per order  - Administer medications as ordered  - Instruct and encourage patient and family to use good hand hygiene technique  - Identify and instruct in appropriate isolation precautions for identified infection/condition  Outcome: Progressing  Goal: Absence of fever/infection during neutropenic period  Description: INTERVENTIONS:  - Monitor WBC  - Perform strict hand hygiene  - Limit to healthy visitors only  - No plants, dried, fresh or silk flowers with michael in patient room  Outcome: Progressing     Problem: SAFETY ADULT  Goal: Patient will remain free of falls  Description: INTERVENTIONS:  - Educate patient/family on patient safety including physical limitations  - Instruct patient to call for assistance with activity   -  Consider consulting OT/PT to assist with strengthening/mobility based on AM PAC & JH-HLM score  - Consult OT/PT to assist with strengthening/mobility   - Keep Call bell within reach  - Keep bed low and locked with side rails adjusted as appropriate  - Keep care items and personal belongings within reach  - Initiate and maintain comfort rounds  - Make Fall Risk Sign visible to staff  - Apply yellow socks and bracelet for high fall risk patients  - Consider moving patient to room near nurses station  Outcome: Progressing  Goal: Maintain or return to baseline ADL function  Description: INTERVENTIONS:  -  Assess patient's ability to carry out ADLs; assess patient's baseline for ADL function and identify physical deficits which impact ability to perform ADLs (bathing, care of mouth/teeth, toileting, grooming, dressing, etc.)  - Assess/evaluate cause of self-care deficits   - Assess range of motion  - Assess patient's mobility; develop plan if impaired  - Assess patient's need for assistive devices and provide as appropriate  - Encourage maximum independence but intervene and supervise when necessary  - Involve family in performance of ADLs  - Assess for home care needs following discharge   - Consider OT consult to assist with ADL evaluation and planning for discharge  - Provide patient education as appropriate  - Monitor functional capacity and physical performance, use of AM PAC & JH-HLM   - Monitor gait, balance and fatigue with ambulation    Outcome: Progressing  Goal: Maintains/Returns to pre admission functional level  Description: INTERVENTIONS:  - Perform AM-PAC 6 Click Basic Mobility/ Daily Activity assessment daily.  - Set and communicate daily mobility goal to care team and patient/family/caregiver.   - Collaborate with rehabilitation services on mobility goals if consulted a day  - Out of bed for toileting  - Record patient progress and toleration of activity level   Outcome: Progressing     Problem:  DISCHARGE PLANNING  Goal: Discharge to home or other facility with appropriate resources  Description: INTERVENTIONS:  - Identify barriers to discharge w/patient and caregiver  - Arrange for needed discharge resources and transportation as appropriate  - Identify discharge learning needs (meds, wound care, etc.)  - Arrange for interpretive services to assist at discharge as needed  - Refer to Case Management Department for coordinating discharge planning if the patient needs post-hospital services based on physician/advanced practitioner order or complex needs related to functional status, cognitive ability, or social support system  Outcome: Progressing     Problem: POSTPARTUM  Goal: Experiences normal postpartum course  Description: INTERVENTIONS:  - Monitor maternal vital signs  - Assess uterine involution and lochia  Outcome: Progressing  Goal: Appropriate maternal -  bonding  Description: INTERVENTIONS:  - Identify family support  - Assess for appropriate maternal/infant bonding   -Encourage maternal/infant bonding opportunities  - Referral to  or  as needed  Outcome: Progressing  Goal: Establishment of infant feeding pattern  Description: INTERVENTIONS:  - Assess breast/bottle feeding  - Refer to lactation as needed  Outcome: Progressing  Goal: Incision(s), wounds(s) or drain site(s) healing without S/S of infection  Description: INTERVENTIONS  - Assess and document dressing, incision, wound bed, drain sites and surrounding tissue  - Provide patient and family education    Outcome: Progressing

## 2025-06-09 PROCEDURE — 99024 POSTOP FOLLOW-UP VISIT: CPT | Performed by: OBSTETRICS & GYNECOLOGY

## 2025-06-09 PROCEDURE — NC001 PR NO CHARGE: Performed by: OBSTETRICS & GYNECOLOGY

## 2025-06-09 RX ORDER — NIFEDIPINE 30 MG/1
60 TABLET, EXTENDED RELEASE ORAL DAILY
Status: DISCONTINUED | OUTPATIENT
Start: 2025-06-10 | End: 2025-06-11 | Stop reason: HOSPADM

## 2025-06-09 RX ORDER — NIFEDIPINE 30 MG/1
30 TABLET, EXTENDED RELEASE ORAL ONCE
Status: COMPLETED | OUTPATIENT
Start: 2025-06-09 | End: 2025-06-09

## 2025-06-09 RX ORDER — LABETALOL HYDROCHLORIDE 5 MG/ML
20 INJECTION, SOLUTION INTRAVENOUS ONCE
Status: DISCONTINUED | OUTPATIENT
Start: 2025-06-09 | End: 2025-06-11 | Stop reason: HOSPADM

## 2025-06-09 RX ADMIN — OXYCODONE HYDROCHLORIDE AND ACETAMINOPHEN 500 MG: 500 TABLET ORAL at 08:59

## 2025-06-09 RX ADMIN — ENOXAPARIN SODIUM 40 MG: 40 INJECTION SUBCUTANEOUS at 09:01

## 2025-06-09 RX ADMIN — ACETAMINOPHEN 650 MG: 325 TABLET, FILM COATED ORAL at 20:42

## 2025-06-09 RX ADMIN — ACETAMINOPHEN 650 MG: 325 TABLET, FILM COATED ORAL at 05:10

## 2025-06-09 RX ADMIN — DOCUSATE SODIUM 100 MG: 100 CAPSULE, LIQUID FILLED ORAL at 18:06

## 2025-06-09 RX ADMIN — FUROSEMIDE 20 MG: 20 TABLET ORAL at 08:59

## 2025-06-09 RX ADMIN — IBUPROFEN 600 MG: 600 TABLET ORAL at 00:32

## 2025-06-09 RX ADMIN — OXYCODONE HYDROCHLORIDE AND ACETAMINOPHEN 500 MG: 500 TABLET ORAL at 18:06

## 2025-06-09 RX ADMIN — ACETAMINOPHEN 650 MG: 325 TABLET, FILM COATED ORAL at 12:44

## 2025-06-09 RX ADMIN — NIFEDIPINE 30 MG: 30 TABLET, FILM COATED, EXTENDED RELEASE ORAL at 08:59

## 2025-06-09 RX ADMIN — DOCUSATE SODIUM 100 MG: 100 CAPSULE, LIQUID FILLED ORAL at 08:59

## 2025-06-09 RX ADMIN — IBUPROFEN 600 MG: 600 TABLET ORAL at 12:44

## 2025-06-09 RX ADMIN — NIFEDIPINE 30 MG: 30 TABLET, FILM COATED, EXTENDED RELEASE ORAL at 19:16

## 2025-06-09 NOTE — ASSESSMENT & PLAN NOTE
For 2 hour GTT 4-12wks post delivery.     Lab Results   Component Value Date    HGBA1C 5.3 04/03/2024       Recent Labs     06/06/25  1730 06/06/25  1833 06/06/25  1937 06/06/25  2132   POCGLU 87 96 93 104       Blood Sugar Average: Last 72 hrs:  (P) [id number]

## 2025-06-09 NOTE — ASSESSMENT & PLAN NOTE
For 2 hour GTT 4-12wks post delivery.     Lab Results   Component Value Date    HGBA1C 5.3 04/03/2024       Recent Labs     06/06/25  1937 06/06/25  2132   POCGLU 93 104       Blood Sugar Average: Last 72 hrs:  (P) [id number]

## 2025-06-09 NOTE — PLAN OF CARE
Problem: PAIN - ADULT  Goal: Verbalizes/displays adequate comfort level or baseline comfort level  Description: Interventions:  - Encourage patient to monitor pain and request assistance  - Assess pain using appropriate pain scale  - Administer analgesics as ordered based on type and severity of pain and evaluate response  - Implement non-pharmacological measures as appropriate and evaluate response  - Consider cultural and social influences on pain and pain management  - Notify physician/advanced practitioner if interventions unsuccessful or patient reports new pain  - Educate patient/family on pain management process including their role and importance of  reporting pain   - Provide non-pharmacologic/complimentary pain relief interventions  Outcome: Progressing     Problem: INFECTION - ADULT  Goal: Absence or prevention of progression during hospitalization  Description: INTERVENTIONS:  - Assess and monitor for signs and symptoms of infection  - Monitor lab/diagnostic results  - Monitor all insertion sites, i.e. indwelling lines, tubes, and drains  - Monitor endotracheal if appropriate and nasal secretions for changes in amount and color  - [place] appropriate cooling/warming therapies per order  - Administer medications as ordered  - Instruct and encourage patient and family to use good hand hygiene technique  - Identify and instruct in appropriate isolation precautions for identified infection/condition  Outcome: Progressing  Goal: Absence of fever/infection during neutropenic period  Description: INTERVENTIONS:  - Monitor WBC  - Perform strict hand hygiene  - Limit to healthy visitors only  - No plants, dried, fresh or silk flowers with michael in patient room  Outcome: Progressing     Problem: SAFETY ADULT  Goal: Patient will remain free of falls  Description: INTERVENTIONS:  - Educate patient/family on patient safety including physical limitations  - Instruct patient to call for assistance with activity   -  Consider consulting OT/PT to assist with strengthening/mobility based on AM PAC & JH-HLM score  - Consult OT/PT to assist with strengthening/mobility   - Keep Call bell within reach  - Keep bed low and locked with side rails adjusted as appropriate  - Keep care items and personal belongings within reach  - Initiate and maintain comfort rounds  - Make Fall Risk Sign visible to staff  - Offer Toileting every  Hours, in advance of need  - Initiate/Maintain alarm  - Obtain necessary fall risk management equipment:   - Apply yellow socks and bracelet for high fall risk patients  - Consider moving patient to room near nurses station  Outcome: Progressing  Goal: Maintain or return to baseline ADL function  Description: INTERVENTIONS:  -  Assess patient's ability to carry out ADLs; assess patient's baseline for ADL function and identify physical deficits which impact ability to perform ADLs (bathing, care of mouth/teeth, toileting, grooming, dressing, etc.)  - Assess/evaluate cause of self-care deficits   - Assess range of motion  - Assess patient's mobility; develop plan if impaired  - Assess patient's need for assistive devices and provide as appropriate  - Encourage maximum independence but intervene and supervise when necessary  - Involve family in performance of ADLs  - Assess for home care needs following discharge   - Consider OT consult to assist with ADL evaluation and planning for discharge  - Provide patient education as appropriate  - Monitor functional capacity and physical performance, use of AM PAC & JH-HLM   - Monitor gait, balance and fatigue with ambulation    Outcome: Progressing  Goal: Maintains/Returns to pre admission functional level  Description: INTERVENTIONS:  - Perform AM-PAC 6 Click Basic Mobility/ Daily Activity assessment daily.  - Set and communicate daily mobility goal to care team and patient/family/caregiver.   - Collaborate with rehabilitation services on mobility goals if consulted  -  Perform Range of Motion  times a day.  - Reposition patient every  hours.  - Dangle patient  times a day  - Stand patient  times a day  - Ambulate patient  times a day  - Out of bed to chair  times a day   - Out of bed for meals  times a day  - Out of bed for toileting  - Record patient progress and toleration of activity level   Outcome: Progressing     Problem: DISCHARGE PLANNING  Goal: Discharge to home or other facility with appropriate resources  Description: INTERVENTIONS:  - Identify barriers to discharge w/patient and caregiver  - Arrange for needed discharge resources and transportation as appropriate  - Identify discharge learning needs (meds, wound care, etc.)  - Arrange for interpretive services to assist at discharge as needed  - Refer to Case Management Department for coordinating discharge planning if the patient needs post-hospital services based on physician/advanced practitioner order or complex needs related to functional status, cognitive ability, or social support system  Outcome: Progressing     Problem: POSTPARTUM  Goal: Experiences normal postpartum course  Description: INTERVENTIONS:  - Monitor maternal vital signs  - Assess uterine involution and lochia  Outcome: Progressing  Goal: Appropriate maternal -  bonding  Description: INTERVENTIONS:  - Identify family support  - Assess for appropriate maternal/infant bonding   -Encourage maternal/infant bonding opportunities  - Referral to  or  as needed  Outcome: Progressing  Goal: Establishment of infant feeding pattern  Description: INTERVENTIONS:  - Assess breast/bottle feeding  - Refer to lactation as needed  Outcome: Progressing  Goal: Incision(s), wounds(s) or drain site(s) healing without S/S of infection  Description: INTERVENTIONS  - Assess and document dressing, incision, wound bed, drain sites and surrounding tissue  - Provide patient and family education  - Perform skin care/dressing changes every    Outcome: Progressing

## 2025-06-09 NOTE — PROGRESS NOTES
Progress Note - OB/GYN   Name: Maribel Morelos 30 y.o. female I MRN: [id number]  Unit/Bed#: -01 I Date of Admission: 2025   Date of Service: 2025 I Hospital Day: 5     Assessment & Plan  S/P primary low transverse   - doing well postpartum   - routine postop care  - lovenox, ambulation and SCDs for VTE ppx  - Hgb 13.2 > 10.5  Diet controlled gestational diabetes mellitus (GDM) in third trimester [O24.410]  For 2 hour GTT 4-12wks post delivery.     Lab Results   Component Value Date    HGBA1C 5.3 2024       Recent Labs     25  1937 25  2132   POCGLU 93 104       Blood Sugar Average: Last 72 hrs:  (P) [id number]    Gestational hypertension  Blood pressures normal today, continue to monitor.   - 1 week BP check in office  Rh negative state in antepartum period  - s/p rhogam 25 given baby's Rh + status      Increasing procardia to 60mg in the AM.  Add 30mg XL now.        R Delia Youssef MD, FACOG

## 2025-06-09 NOTE — LACTATION NOTE
"This note was copied from a baby's chart.  CONSULT - LACTATION  Baby Boy Morelos (Amber) 3 days male MRN: [id number]    [facility] NURSERY Room / Bed: (N)/(N) Encounter: [id number]    Maternal Information     MOTHER:  Maribel Morelos  Maternal Age: 30 y.o.  OB History: # 1 - Date: 25, Sex: Male, Weight: 3410 g (7 lb 8.3 oz), GA: 38w5d, Type: , Low Transverse, Apgar1: 8, Apgar5: 9, Living: Living, Birth Comments: Jewel Reynolds   Previous breast reduction surgery? No    Lactation history:   Has patient previously breast fed: No   How long had patient previously breast fed:     Previous breast feeding complications:       Past Surgical History:   Procedure Laterality Date    FOOT SURGERY Left     Bunion    [place]  DELIVERY ONLY N/A 2025    Procedure:  SECTION ();  Surgeon: RIGOBERTO Youssef MD;  Location: [facility];  Service: Obstetrics       Birth information:  YOB: 2025   Time of birth: 10:45 PM   Sex: male   Delivery type: , Low Transverse   Birth Weight: 3410 g (7 lb 8.3 oz)   Percent of Weight Change: -9%     Gestational Age: 38w5d   [unfilled]    Reason for Consult:    Reason for Consult: Initial assessment (ext) - 20 min, Latch Assess (ext) - 20 min, Discharge (routine) - 10 min    Risk Factors:         Breast and nipple assessment:   Breasts/Nipples  Left Breast: Filling  Right Breast: Filling  Left Nipple: Everted, Other (Comment) (short shank)  Right Nipple: Everted, Other (Comment) (short shank)  Intervention: Other (comment), Hand expression (helped with positioning/maintaining deep latch & support available)  Breastfeeding Progress: Not yet established, Latch issues, Other issues (comment) (Early supplement with Donor Milk)  Reasons for not Breastfeeding: Infant medical condition (\" for jaundice\")    OB Lactation Tools:    Other OB Lactation Tools  Feeding Devices: Pump, Syringe, " Bottle    Breast Pump:    Breast Pump  Pump: Electric, Manual, Personal (has Spectra S2 from Insurance)  Pump Review/Education: Setup, frequency, and cleaning, Milk storage  Initiated by: STAFF      [place] Assessment: normal assessment    Feeding assessment: feeding well with guidance     LATCH:  Latch: Grasps breast, tongue down, lips flanged, rhythmic sucking   Audible Swallowing: A few with stimulation   Type of Nipple: Everted (After stimulation) (short shank)   Comfort (Breast/Nipple): Soft/non-tender   Hold (Positioning): Partial assist, teach one side, mother does other, staff holds   LATCH Score: 8       [place]:                   Feeding recommendations:  breast feed on demand       25 1100   Patient Follow-Up   Lactation Consult Status 2   Follow-Up Type Inpatient;Call as needed   Other OB Lactation Documentation    Additional Problem Noted Initial Visit - NEW Family Overview of establishing breastfeeding then called back in for assistance with positioning/maintaining deep latch  (Ready, Set Baby; Checklist essentials of positioning & latch; Congratulations on your Baby! AND Discharge Booklets @ bedside)       Ready, Set Baby & discharge breastfeeding booklets briefly reviewed including the feeding log. Emphasized 8 or more (12) feedings in a 24 hour period, what to expect for the number of diapers per day of life and the progression of properties of the  stooling pattern.    List of reasons to call a lactation consultant.  Feeding logs  Feeding cues  Hand expression  Baby's Second day (cluster feeding)  Breastfeeding and Your Lifestyle (Medications, Alcohol, Caffeine, Smoking, Street Drugs, Methadone)  First Two Weeks Survival Guide for Breastfeeding  Breast Changes  Physical Therapy  Storage and Handling of Breast milk  How to Keep Your Breast Pump Kit Clean  The Employed Breastfeeding Mother  Mixed feeding  Bottle feeding like breastfeeding (paced bottle feeding)  astfeeding and your  lifestyle, storage and preparation of breast milk, how to keep you breast pump clean, the employed breastfeeding mother and paced bottle feeding handouts.     Booklet included Breastfeeding Resources for after discharge including access to the number for the [facility].    Discussed risks for early supplementation: over feeding, longer digestion times, engorgement for mom, lower milk supply for mom, and nipple confusion.     Benefits of breast feeding for infant's intestinal tract, less engorgement for mom, protection from multiple disease processes as infant develops, avoidance of over feeding for infant, less nipple confusion, and increased health benefits for mom.     Called back in for assistance feeding at breast. Education on positioning and alignment. Mom is encouraged to:     - Bring baby up to the breast (use of pillows to elevate so baby's torso is against mom's breasts)   - Skin to skin for feedings with top hand exposed to show signs of satiation   - Chin deep into breast tissue (make baby look up to the nipple)   - nose aligned to the nipple   -Wait for wide gape, place chin behind the areola on the breast so nipple is at the nose. Once baby opens their mouth wide, the nipple will be aimed at the upper, back palate  - Cheeks should be touching breast, and baby should have a long neck   - Ear, shoulder, hip will be in alignment   - Deep, snug hold of baby up to the breast   - Every baby knows how to breathe at the breast. The tip of the nose may appear to touch the breast. Babies breathe from the side of the nasal passages. If baby can not breathe due to improper alignment, baby will unlatch    Mom had baby at right breast using football hold. Worked on positioning infant up at chest level and starting to feed infant with nose arriving at the nipple. Then, using areolar compression to achieve a deep latch that is comfortable and exchanges optimum amounts of milk.  Guided dyad to deep latch.  Using breast compressions & stimulation to keep active suckling till slid down with relaxed tone. Nursing parent  was able to note signs of a good feeding like: less discomfort after latch on tenderness, good rocker suckling with stimulation, breast softening; rounded nipple and relaxed tone after nursing at breast.  Father also shown signs of good latch /feed.  Burp attempt. Then to left breast also using football hold till baby asleep at breast with relaxed tone. Parents seem pleased with session.     Encouraged parents to call for assistance, questions, and concerns about breastfeeding.       Debi Campoverde RN 6/9/2025 2:07 PM

## 2025-06-09 NOTE — PLAN OF CARE
Problem: PAIN - ADULT  Goal: Verbalizes/displays adequate comfort level or baseline comfort level  Description: Interventions:  - Encourage patient to monitor pain and request assistance  - Assess pain using appropriate pain scale  - Administer analgesics as ordered based on type and severity of pain and evaluate response  - Implement non-pharmacological measures as appropriate and evaluate response  - Consider cultural and social influences on pain and pain management  - Notify physician/advanced practitioner if interventions unsuccessful or patient reports new pain  - Educate patient/family on pain management process including their role and importance of  reporting pain   - Provide non-pharmacologic/complimentary pain relief interventions  Outcome: Progressing     Problem: INFECTION - ADULT  Goal: Absence or prevention of progression during hospitalization  Description: INTERVENTIONS:  - Assess and monitor for signs and symptoms of infection  - Monitor lab/diagnostic results  - Monitor all insertion sites, i.e. indwelling lines, tubes, and drains  - Monitor endotracheal if appropriate and nasal secretions for changes in amount and color  - [place] appropriate cooling/warming therapies per order  - Administer medications as ordered  - Instruct and encourage patient and family to use good hand hygiene technique  - Identify and instruct in appropriate isolation precautions for identified infection/condition  Outcome: Progressing  Goal: Absence of fever/infection during neutropenic period  Description: INTERVENTIONS:  - Monitor WBC  - Perform strict hand hygiene  - Limit to healthy visitors only  - No plants, dried, fresh or silk flowers with michael in patient room  Outcome: Progressing     Problem: SAFETY ADULT  Goal: Patient will remain free of falls  Description: INTERVENTIONS:  - Educate patient/family on patient safety including physical limitations  - Instruct patient to call for assistance with activity   -  Consider consulting OT/PT to assist with strengthening/mobility based on AM PAC & JH-HLM score  - Consult OT/PT to assist with strengthening/mobility   - Keep Call bell within reach  - Keep bed low and locked with side rails adjusted as appropriate  - Keep care items and personal belongings within reach  - Initiate and maintain comfort rounds  - Make Fall Risk Sign visible to staff  - Apply yellow socks and bracelet for high fall risk patients  - Consider moving patient to room near nurses station  Outcome: Progressing  Goal: Maintain or return to baseline ADL function  Description: INTERVENTIONS:  -  Assess patient's ability to carry out ADLs; assess patient's baseline for ADL function and identify physical deficits which impact ability to perform ADLs (bathing, care of mouth/teeth, toileting, grooming, dressing, etc.)  - Assess/evaluate cause of self-care deficits   - Assess range of motion  - Assess patient's mobility; develop plan if impaired  - Assess patient's need for assistive devices and provide as appropriate  - Encourage maximum independence but intervene and supervise when necessary  - Involve family in performance of ADLs  - Assess for home care needs following discharge   - Consider OT consult to assist with ADL evaluation and planning for discharge  - Provide patient education as appropriate  - Monitor functional capacity and physical performance, use of AM PAC & JH-HLM   - Monitor gait, balance and fatigue with ambulation    Outcome: Progressing  Goal: Maintains/Returns to pre admission functional level  Description: INTERVENTIONS:  - Perform AM-PAC 6 Click Basic Mobility/ Daily Activity assessment daily.  - Set and communicate daily mobility goal to care team and patient/family/caregiver.   - Collaborate with rehabilitation services on mobility goals if consulted  - Out of bed for toileting  - Record patient progress and toleration of activity level   Outcome: Progressing     Problem: DISCHARGE  PLANNING  Goal: Discharge to home or other facility with appropriate resources  Description: INTERVENTIONS:  - Identify barriers to discharge w/patient and caregiver  - Arrange for needed discharge resources and transportation as appropriate  - Identify discharge learning needs (meds, wound care, etc.)  - Arrange for interpretive services to assist at discharge as needed  - Refer to Case Management Department for coordinating discharge planning if the patient needs post-hospital services based on physician/advanced practitioner order or complex needs related to functional status, cognitive ability, or social support system  Outcome: Progressing     Problem: POSTPARTUM  Goal: Experiences normal postpartum course  Description: INTERVENTIONS:  - Monitor maternal vital signs  - Assess uterine involution and lochia  Outcome: Progressing  Goal: Appropriate maternal -  bonding  Description: INTERVENTIONS:  - Identify family support  - Assess for appropriate maternal/infant bonding   -Encourage maternal/infant bonding opportunities  - Referral to  or  as needed  Outcome: Progressing  Goal: Establishment of infant feeding pattern  Description: INTERVENTIONS:  - Assess breast/bottle feeding  - Refer to lactation as needed  Outcome: Progressing  Goal: Incision(s), wounds(s) or drain site(s) healing without S/S of infection  Description: INTERVENTIONS  - Assess and document dressing, incision, wound bed, drain sites and surrounding tissue  - Provide patient and family education  Outcome: Progressing

## 2025-06-09 NOTE — PLAN OF CARE
Problem: PAIN - ADULT  Goal: Verbalizes/displays adequate comfort level or baseline comfort level  Description: Interventions:  - Encourage patient to monitor pain and request assistance  - Assess pain using appropriate pain scale  - Administer analgesics as ordered based on type and severity of pain and evaluate response  - Implement non-pharmacological measures as appropriate and evaluate response  - Consider cultural and social influences on pain and pain management  - Notify physician/advanced practitioner if interventions unsuccessful or patient reports new pain  - Educate patient/family on pain management process including their role and importance of  reporting pain   - Provide non-pharmacologic/complimentary pain relief interventions  Outcome: Progressing     Problem: INFECTION - ADULT  Goal: Absence or prevention of progression during hospitalization  Description: INTERVENTIONS:  - Assess and monitor for signs and symptoms of infection  - Monitor lab/diagnostic results  - Monitor all insertion sites, i.e. indwelling lines, tubes, and drains  - Monitor endotracheal if appropriate and nasal secretions for changes in amount and color  - [place] appropriate cooling/warming therapies per order  - Administer medications as ordered  - Instruct and encourage patient and family to use good hand hygiene technique  - Identify and instruct in appropriate isolation precautions for identified infection/condition  Outcome: Progressing  Goal: Absence of fever/infection during neutropenic period  Description: INTERVENTIONS:  - Monitor WBC  - Perform strict hand hygiene  - Limit to healthy visitors only  - No plants, dried, fresh or silk flowers with michael in patient room  Outcome: Progressing     Problem: SAFETY ADULT  Goal: Patient will remain free of falls  Description: INTERVENTIONS:  - Educate patient/family on patient safety including physical limitations  - Instruct patient to call for assistance with activity   -  Consider consulting OT/PT to assist with strengthening/mobility based on AM PAC & JH-HLM score  - Consult OT/PT to assist with strengthening/mobility   - Keep Call bell within reach  - Keep bed low and locked with side rails adjusted as appropriate  - Keep care items and personal belongings within reach  - Initiate and maintain comfort rounds  - Make Fall Risk Sign visible to staff  - Offer Toileting every  Hours, in advance of need  - Initiate/Maintain alarm  - Obtain necessary fall risk management equipment:   - Apply yellow socks and bracelet for high fall risk patients  - Consider moving patient to room near nurses station  Outcome: Progressing  Goal: Maintain or return to baseline ADL function  Description: INTERVENTIONS:  -  Assess patient's ability to carry out ADLs; assess patient's baseline for ADL function and identify physical deficits which impact ability to perform ADLs (bathing, care of mouth/teeth, toileting, grooming, dressing, etc.)  - Assess/evaluate cause of self-care deficits   - Assess range of motion  - Assess patient's mobility; develop plan if impaired  - Assess patient's need for assistive devices and provide as appropriate  - Encourage maximum independence but intervene and supervise when necessary  - Involve family in performance of ADLs  - Assess for home care needs following discharge   - Consider OT consult to assist with ADL evaluation and planning for discharge  - Provide patient education as appropriate  - Monitor functional capacity and physical performance, use of AM PAC & JH-HLM   - Monitor gait, balance and fatigue with ambulation    Outcome: Progressing  Goal: Maintains/Returns to pre admission functional level  Description: INTERVENTIONS:  - Perform AM-PAC 6 Click Basic Mobility/ Daily Activity assessment daily.  - Set and communicate daily mobility goal to care team and patient/family/caregiver.   - Collaborate with rehabilitation services on mobility goals if consulted  -  Perform Range of Motion  times a day.  - Reposition patient every  hours.  - Dangle patient  times a day  - Stand patient  times a day  - Ambulate patient  times a day  - Out of bed to chair  times a day   - Out of bed for meals  times a day  - Out of bed for toileting  - Record patient progress and toleration of activity level   Outcome: Progressing     Problem: DISCHARGE PLANNING  Goal: Discharge to home or other facility with appropriate resources  Description: INTERVENTIONS:  - Identify barriers to discharge w/patient and caregiver  - Arrange for needed discharge resources and transportation as appropriate  - Identify discharge learning needs (meds, wound care, etc.)  - Arrange for interpretive services to assist at discharge as needed  - Refer to Case Management Department for coordinating discharge planning if the patient needs post-hospital services based on physician/advanced practitioner order or complex needs related to functional status, cognitive ability, or social support system  Outcome: Progressing     Problem: POSTPARTUM  Goal: Experiences normal postpartum course  Description: INTERVENTIONS:  - Monitor maternal vital signs  - Assess uterine involution and lochia  Outcome: Progressing  Goal: Appropriate maternal -  bonding  Description: INTERVENTIONS:  - Identify family support  - Assess for appropriate maternal/infant bonding   -Encourage maternal/infant bonding opportunities  - Referral to  or  as needed  Outcome: Progressing  Goal: Establishment of infant feeding pattern  Description: INTERVENTIONS:  - Assess breast/bottle feeding  - Refer to lactation as needed  Outcome: Progressing  Goal: Incision(s), wounds(s) or drain site(s) healing without S/S of infection  Description: INTERVENTIONS  - Assess and document dressing, incision, wound bed, drain sites and surrounding tissue  - Provide patient and family education  - Perform skin care/dressing changes every    Outcome: Progressing

## 2025-06-09 NOTE — PROGRESS NOTES
Progress Note - OB/GYN   Name: Maribel Morelos 30 y.o. female I MRN: [id number]  Unit/Bed#: -01 I Date of Admission: 2025   Date of Service: 2025 I Hospital Day: 5     Assessment & Plan  S/P primary low transverse   - doing well postpartum   - routine postop care  - lovenox, ambulation and SCDs for VTE ppx  - Hgb 13.2 > 10.5  Diet controlled gestational diabetes mellitus (GDM) in third trimester [O24.410]  For 2 hour GTT 4-12wks post delivery.     Lab Results   Component Value Date    HGBA1C 5.3 2024       Recent Labs     25  1730 25  1833 25  1937 25  2132   POCGLU 87 96 93 104       Blood Sugar Average: Last 72 hrs:  (P) [id number]    Gestational hypertension  Blood pressures normal today, continue to monitor.   - 1 week BP check in office  Rh negative state in antepartum period  - s/p rhogam 25 given baby's Rh + status    OB/GYN Postpartum Progress Note    Maribel Morelos 30 y.o. female MRN: [id number]  Unit/Bed#: -01 Encounter: [id number]      Assessment/Plan:    Maribel Morelos is a 30 y.o.  who is POD #3,  s/p  with labor  at 38w5d.    No new Assessment & Plan notes have been filed under this hospital service since the last note was generated.  Service: OB/GYN       Continue routine postop PP care.  gHTN:  on 30XL procardia, may need to titrate up.   Will need to evaluate if ok for d/c tomorrow.  Rh neg:  s/p RhoGAM  4.  Disposition: Anticipate discharge home postpartum Day #4    Barriers to discharge: None          Subjective/Objective     Subjective: Postpartum state    Pain: iswell controlled   Tolerating PO: yes  Voiding: Yes  Flatus: yes  BM: yes  Ambulating: yes  Breastfeeding status: Breastfeeding  Chest pain:  no  Shortness of breath:  no  Incision: C/D/I  Leg pain:  no  Leg swelling: yes  Lochia: minimal      Objective:     Vitals:  Vitals:    25 1516 25 2253 25 0854  06/09/25 1055   BP: 142/80 129/66 138/90 145/92   BP Location: Right arm Right arm Left arm Right arm   Pulse: 105 92 (!) 109 (!) 110   Resp: 18 18 16    Temp: 98 °F (36.7 °C) 98 °F (36.7 °C) 98.6 °F (37 °C)    TempSrc: Oral Oral Oral    SpO2: 99%  98% 98%   Weight:       Height:           Lab Results   Component Value Date    WBC 17.30 (H) 06/07/2025    HGB 10.5 (L) 06/07/2025    HCT 31.7 (L) 06/07/2025    MCV 87 06/07/2025     06/07/2025         Blood pressures:  labile,fluctuating depending on activity.    Physical Exam:   GEN: Appears well, alert and oriented x 3, pleasant and cooperative   NEURO Alert and oriented to person, place, and time.   CV: Regular rate  RESP: non labored breathing  ABDOMEN: soft, appropriate post partum tenderness, no distention, Uterine fundus firm and non-tender, at the umbilicus  INCISION:  C/D/I with steristrips  EXTREMITIES: non-tender, edema present bilaterally  Bleeding:  Mild    Blood type: A  negative    RhoGAM given:   Yes    QBL:  678 cc      Past Medical History[1]    Current Medications[2]            RIGOBERTO Youssef MD, FACOG  Obstetrics & Gynecology  06/09/25         [1]   Past Medical History:  Diagnosis Date    Allergic     Anxiety     never medicated    Headache(784.0)     Migraine     Palpitations     seen by cardiologist - holter monitor negative    Polycystic ovary syndrome     dx 2024    Strep throat 08/11/2021    Urinary tract infection     Varicella     had vaccine   [2]   Current Facility-Administered Medications:     acetaminophen (TYLENOL) tablet 650 mg, 650 mg, Oral, Q6H PRN, Georgia Thomas MD, 650 mg at 06/09/25 0510    ascorbic acid (VITAMIN C) tablet 500 mg, 500 mg, Oral, BID, RIGOBERTO Youssef MD, 500 mg at 06/09/25 0859    benzocaine-menthol-lanolin-aloe (DERMOPLAST) 20-0.5 % topical spray 1 Application, 1 Application, Topical, Q6H PRN, RIGOBERTO Youssef MD, 1 Application at 06/07/25 0145    diphenhydrAMINE (BENADRYL) injection 25 mg,  25 mg, Intravenous, Q6H PRN, RIGOBERTO Youssef MD    docusate sodium (COLACE) capsule 100 mg, 100 mg, Oral, BID, RIGOBERTO Youssef MD, 100 mg at 06/09/25 0859    enoxaparin (LOVENOX) subcutaneous injection 40 mg, 40 mg, Subcutaneous, Daily, RIGOBERTO Youssef MD, 40 mg at 06/09/25 0901    furosemide (LASIX) tablet 20 mg, 20 mg, Oral, Daily, Ana Merino MD, 20 mg at 06/09/25 0859    hydrocortisone 1 % cream, , Topical, 4x Daily PRN, Georgia Thomas MD    ibuprofen (MOTRIN) tablet 600 mg, 600 mg, Oral, Q6H PRN, Ana Merino MD, 600 mg at 06/09/25 0032    lactated ringers infusion, 125 mL/hr, Intravenous, Continuous, Georgia Thomas MD, Stopped at 06/07/25 1727    NIFEdipine (PROCARDIA XL) 24 hr tablet 30 mg, 30 mg, Oral, Daily, Ana Merino MD, 30 mg at 06/09/25 0859    oxyCODONE (ROXICODONE) IR tablet 5 mg, 5 mg, Oral, Q4H PRN, RIGOBERTO Youssef MD    ropivacaine 0.2% PCEA, , Epidural, Continuous, Tierney Richey MD, New Bag at 06/06/25 1701    simethicone (MYLICON) chewable tablet 80 mg, 80 mg, Oral, 4x Daily PRN, RIGOBERTO Youssef MD    witch hazel-glycerin (TUCKS) topical pad 1 Pad, 1 Pad, Topical, Q4H PRN, RIGOBERTO Youssef MD

## 2025-06-09 NOTE — UTILIZATION REVIEW
"NOTIFICATION OF INPATIENT ADMISSION   MATERNITY/DELIVERY AUTHORIZATION REQUEST   SERVICING FACILITY:   [facility]  Parent Child Health - L&D, , NICU  [address] [place] [address]  Tax ID: [id number]  NPI: [id number] ATTENDING PROVIDER:  Attending Name and NPI#: RIGOBERTO Youssef Md [[id number]]  [facility] [address] [place] [address] [address]  Phone: [phone]     ADMISSION INFORMATION:  [facility]  [facility] Service Code: 21  Inpatient Admission Date/Time: 25 11:52 AM  Discharge Date/Time: No discharge date for patient encounter.  Admitting Diagnosis Code/Description:  No admission diagnoses are documented for this encounter.     Mother: Maribel Morelos 1995 Estimated Date of Delivery: 6/15/25  Delivering clinician: RIGOBERTO Youssef   OB History          1    Para   1    Term   1       0    AB   0    Living   1         SAB   0    IAB   0    Ectopic   0    Multiple   0    Live Births   1                Name & MRN:   Information for the patient's :  Jewel Morelos Boy (Maribel) [[id number]]    Delivery Information:  Sex: male  Delivered 2025 10:45 PM by , Low Transverse; Gestational Age: 38w5d    Halfway Measurements:  Weight: 7 lb 8.3 oz (3410 g);  Height: 22\"    APGAR 1 minute 5 minutes 10 minutes   Totals: 8 9       UTILIZATION REVIEW CONTACT:  Sujatha Yoder Utilization   Network Utilization Review Department  Phone: [phone]  Fax [phone]  Email: [email or web address]  Contact for approvals/pending authorizations, clinical reviews, and discharge.     PHYSICIAN ADVISORY SERVICES:  Medical Necessity Denial & Hdpt-vm-Glwn Review  Phone: [phone]  Fax: [phone]  Email: [email or web address]     DISCHARGE SUPPORT TEAM:  For Patients Discharge Needs & Updates  Phone: [phone] opt. 2 Fax: [phone]  Email: " [email or web address]

## 2025-06-10 VITALS
DIASTOLIC BLOOD PRESSURE: 95 MMHG | HEART RATE: 106 BPM | WEIGHT: 244 LBS | OXYGEN SATURATION: 98 % | BODY MASS INDEX: 41.66 KG/M2 | TEMPERATURE: 97.9 F | RESPIRATION RATE: 19 BRPM | HEIGHT: 64 IN | SYSTOLIC BLOOD PRESSURE: 142 MMHG

## 2025-06-10 PROCEDURE — NC001 PR NO CHARGE: Performed by: OBSTETRICS & GYNECOLOGY

## 2025-06-10 PROCEDURE — 99024 POSTOP FOLLOW-UP VISIT: CPT | Performed by: OBSTETRICS & GYNECOLOGY

## 2025-06-10 RX ADMIN — ACETAMINOPHEN 650 MG: 325 TABLET, FILM COATED ORAL at 12:29

## 2025-06-10 RX ADMIN — FUROSEMIDE 20 MG: 20 TABLET ORAL at 08:42

## 2025-06-10 RX ADMIN — IBUPROFEN 600 MG: 600 TABLET ORAL at 11:07

## 2025-06-10 RX ADMIN — DOCUSATE SODIUM 100 MG: 100 CAPSULE, LIQUID FILLED ORAL at 08:41

## 2025-06-10 RX ADMIN — OXYCODONE HYDROCHLORIDE AND ACETAMINOPHEN 500 MG: 500 TABLET ORAL at 08:42

## 2025-06-10 RX ADMIN — ACETAMINOPHEN 650 MG: 325 TABLET, FILM COATED ORAL at 06:31

## 2025-06-10 RX ADMIN — OXYCODONE HYDROCHLORIDE AND ACETAMINOPHEN 500 MG: 500 TABLET ORAL at 17:33

## 2025-06-10 RX ADMIN — IBUPROFEN 600 MG: 600 TABLET ORAL at 02:33

## 2025-06-10 RX ADMIN — IBUPROFEN 600 MG: 600 TABLET ORAL at 17:30

## 2025-06-10 RX ADMIN — ENOXAPARIN SODIUM 40 MG: 40 INJECTION SUBCUTANEOUS at 08:42

## 2025-06-10 RX ADMIN — DOCUSATE SODIUM 100 MG: 100 CAPSULE, LIQUID FILLED ORAL at 17:30

## 2025-06-10 RX ADMIN — NIFEDIPINE 60 MG: 30 TABLET, FILM COATED, EXTENDED RELEASE ORAL at 08:41

## 2025-06-10 NOTE — PLAN OF CARE
Problem: PAIN - ADULT  Goal: Verbalizes/displays adequate comfort level or baseline comfort level  Description: Interventions:  - Encourage patient to monitor pain and request assistance  - Assess pain using appropriate pain scale  - Administer analgesics as ordered based on type and severity of pain and evaluate response  - Implement non-pharmacological measures as appropriate and evaluate response  - Consider cultural and social influences on pain and pain management  - Notify physician/advanced practitioner if interventions unsuccessful or patient reports new pain  - Educate patient/family on pain management process including their role and importance of  reporting pain   - Provide non-pharmacologic/complimentary pain relief interventions  Outcome: Progressing     Problem: INFECTION - ADULT  Goal: Absence or prevention of progression during hospitalization  Description: INTERVENTIONS:  - Assess and monitor for signs and symptoms of infection  - Monitor lab/diagnostic results  - Monitor all insertion sites, i.e. indwelling lines, tubes, and drains  - Monitor endotracheal if appropriate and nasal secretions for changes in amount and color  - [place] appropriate cooling/warming therapies per order  - Administer medications as ordered  - Instruct and encourage patient and family to use good hand hygiene technique  - Identify and instruct in appropriate isolation precautions for identified infection/condition  Outcome: Progressing  Goal: Absence of fever/infection during neutropenic period  Description: INTERVENTIONS:  - Monitor WBC  - Perform strict hand hygiene  - Limit to healthy visitors only  - No plants, dried, fresh or silk flowers with michael in patient room  Outcome: Progressing     Problem: SAFETY ADULT  Goal: Patient will remain free of falls  Description: INTERVENTIONS:  - Educate patient/family on patient safety including physical limitations  - Instruct patient to call for assistance with activity   -  Consider consulting OT/PT to assist with strengthening/mobility based on AM PAC & JH-HLM score  - Consult OT/PT to assist with strengthening/mobility   - Keep Call bell within reach  - Keep bed low and locked with side rails adjusted as appropriate  - Keep care items and personal belongings within reach  - Initiate and maintain comfort rounds  - Make Fall Risk Sign visible to staff  - Outcome: Progressing  Goal: Maintain or return to baseline ADL function  Description: INTERVENTIONS:  -  Assess patient's ability to carry out ADLs; assess patient's baseline for ADL function and identify physical deficits which impact ability to perform ADLs (bathing, care of mouth/teeth, toileting, grooming, dressing, etc.)  - Assess/evaluate cause of self-care deficits   - Assess range of motion  - Assess patient's mobility; develop plan if impaired  - Assess patient's need for assistive devices and provide as appropriate  - Encourage maximum independence but intervene and supervise when necessary  - Involve family in performance of ADLs  - Assess for home care needs following discharge   - Consider OT consult to assist with ADL evaluation and planning for discharge  - Provide patient education as appropriate  - Monitor functional capacity and physical performance, use of AM PAC & JH-HLM   - Monitor gait, balance and fatigue with ambulation    Outcome: Progressing  Goal: Maintains/Returns to pre admission functional level  Description: INTERVENTIONS:  - Perform AM-PAC 6 Click Basic Mobility/ Daily Activity assessment daily.  - Set and communicate daily mobility goal to care team and patient/family/caregiver.   - Collaborate with rehabilitation services on mobility goals if consulted  -- Record patient progress and toleration of activity level   Outcome: Progressing     Problem: DISCHARGE PLANNING  Goal: Discharge to home or other facility with appropriate resources  Description: INTERVENTIONS:  - Identify barriers to discharge  w/patient and caregiver  - Arrange for needed discharge resources and transportation as appropriate  - Identify discharge learning needs (meds, wound care, etc.)  - Arrange for interpretive services to assist at discharge as needed  - Refer to Case Management Department for coordinating discharge planning if the patient needs post-hospital services based on physician/advanced practitioner order or complex needs related to functional status, cognitive ability, or social support system  Outcome: Progressing     Problem: POSTPARTUM  Goal: Experiences normal postpartum course  Description: INTERVENTIONS:  - Monitor maternal vital signs  - Assess uterine involution and lochia  Outcome: Progressing  Goal: Appropriate maternal -  bonding  Description: INTERVENTIONS:  - Identify family support  - Assess for appropriate maternal/infant bonding   -Encourage maternal/infant bonding opportunities  - Referral to  or  as needed  Outcome: Progressing  Goal: Establishment of infant feeding pattern  Description: INTERVENTIONS:  - Assess breast/bottle feeding  - Refer to lactation as needed  Outcome: Progressing  Goal: Incision(s), wounds(s) or drain site(s) healing without S/S of infection  Description: INTERVENTIONS  - Assess and document dressing, incision, wound bed, drain sites and surrounding tissue  - Provide patient and family education  - Outcome: Progressing

## 2025-06-10 NOTE — PROGRESS NOTES
Reviewed patient's blood pressure, not in severe range and she is asymptomatic. She is very anxious to go home as she has been here for 6 days now and is exhausted. Feels this is possible cause of elevated BP at times. Patient understands importance of checking blood pressure at home with cuff. BP targets discussed. She has an rx for Procardia XL 60 mg daily. Signs and symptoms of preeclampsia reviewed as well as when to call the office or present to the emergency room.    Message sent to office to reach out to patient to schedule her for a BP check on Friday 6/13, prior to the weekend.    Note written for  per request from his place of occupation.

## 2025-06-10 NOTE — DISCHARGE SUMMARY
ADMISSION  Patient of: [facility]  Admission Date: 2025   Admitting Attending: Dr. RIGOBERTO Youssef MD   Admitting Diagnoses: Problem List[1]    DELIVERY  Delivery Method: , Low Transverse   Delivery Date and Time: 2025 10:45 PM  Delivery Attending: RIGOBERTO Youssef     DISCHARGE  Discharge Date: 6/10/25  Discharge Attending: Dr. RIGOBERTO Youssef MD  Discharge Diagnosis:   Same, Delivered     Clinical course: Admission to Delivery  Maribel Morelos is a 30 y.o.  who was admitted at 38w5d for IOL for GHTN.     Reason for induction: Gestational Hypertension;Gestational Diabetes Type A1.         Induction method:  , ,Misoprostol Gestational Hypertension;Gestational Diabetes Type A1.     For pain control in labor, pt received epidural.  She underwent a primary LTCS for failure to descend.       Delivery  Route of Delivery: , Low Transverse   Reason for  delivery: Arrest of Descent;Other (Add Comments) CPD    Anesthesia: Epidural,   QBL:        QBL:        Delivery: , Low Transverse at 2025 10:45 PM   Laceration: Perineal:   Repaired?      Baby's Weight: 3410 g (7 lb 8.3 oz); 120.28    Apgar scores: 8  and 9  at 1 and 5 minutes, respectively    Clinical Course: Post-Delivery:  The post delivery course was remarkable for upward trending blood pressures that required up titration of Procardia Xl. On the day of discharge, she was doing well on Procardia XL 60 mg daily.    On the day of discharge, the patient was ambulating, voiding spontaneously, tolerating oral intake, and hemodynamically stable. She was able to reasonably perform all ADLs. She had appropriate bowel function. Pain was well-controlled. She was discharged home on postpartum/postop day #4 without complications . Patient was instructed to follow up with her OB as an outpatient and was given appropriate warnings to call her provider with problems or concerns.    Pertinent lab  findings included:   Blood type A-.   S/p Rhogam 25 given baby status Rh +    Last three Hgb values:  Lab Results   Component Value Date    HGB 10.5 (L) 2025    HGB 13.2 2025    HGB 13.2 2025        Problem-specific follow-up plans included the followin week BP check    Discharge med list:  Contraception:       Medication List      START taking these medications     ibuprofen 600 mg tablet; Commonly known as: MOTRIN; Take 1 tablet (600   mg total) by mouth every 6 (six) hours as needed for moderate pain   oxyCODONE 5 immediate release tablet; Commonly known as: Roxicodone;   Take 1 tablet (5 mg total) by mouth every 8 (eight) hours as needed for   moderate pain for up to 10 days Max Daily Amount: 15 mg     CHANGE how you take these medications     NIFEdipine ER 60 MG 24 hr tablet; Commonly known as: ADALAT CC; Take 1   tablet (60 mg total) by mouth daily; Start taking on: 2025     CONTINUE taking these medications     OneTouch Delica Lancets 33G Misc; Use 4 a Day or as instructed   OneTouch Verio Reflect w/Device Kit   PRENATAL PO       Condition at discharge:   good     Disposition:   Home    Planned Readmission:   No    Discharge Statement:  I have spent a total time of 30 minutes in caring for this patient on the day of the visit/encounter. >30 minutes of time was spent on: Impressions, Counseling / Coordination of care, Documenting in the medical record, Reviewing / ordering tests, medicine, procedures  , and Communicating with other healthcare professionals .       [1]   Patient Active Problem List  Diagnosis    Chronic migraine without aura, not intractable    Hx of penicillin allergy    Anxiety    Chronic migraine without aura    Low vitamin D level    Palpitations    PCOS (polycystic ovarian syndrome)    Elevated BP without diagnosis of hypertension    Obesity complicating pregnancy, third trimester    Diet controlled gestational diabetes mellitus (GDM) in third trimester  [O24.410]    Fetal macrosomia during pregnancy in third trimester    Decreased fetal movement affecting management of pregnancy in third trimester    Gestational hypertension    S/P primary low transverse     Rh negative state in antepartum period

## 2025-06-10 NOTE — LACTATION NOTE
This note was copied from a baby's chart.  CONSULT - LACTATION  Baby Boy Morelos (Amber) 4 days male MRN: [id number]    [facility] NURSERY Room / Bed: (N)/(N) Encounter: [id number]    Maternal Information     MOTHER:  Maribel Morelos  Maternal Age: 30 y.o.  OB History: # 1 - Date: 25, Sex: Male, Weight: 3410 g (7 lb 8.3 oz), GA: 38w5d, Type: , Low Transverse, Apgar1: 8, Apgar5: 9, Living: Living, Birth Comments: Jewel Reynolds   Previous breast reduction surgery? No    Lactation history:   Has patient previously breast fed: No   How long had patient previously breast fed:     Previous breast feeding complications:       Past Surgical History:   Procedure Laterality Date    FOOT SURGERY Left     Bunion    NM  DELIVERY ONLY N/A 2025    Procedure:  SECTION ();  Surgeon: RIGOBERTO Youssef MD;  Location: [facility];  Service: Obstetrics       Birth information:  YOB: 2025   Time of birth: 10:45 PM   Sex: male   Delivery type: , Low Transverse   Birth Weight: 3410 g (7 lb 8.3 oz)   Percent of Weight Change: -8%     Gestational Age: 38w5d   [unfilled]    Reason for Consult:    Reason for Consult: Follow-up assessment (ext) - 20 min, High Risk Infant - 10 min    Risk Factors:    Risk Factors: Hyperbilirubinemia    Breast and nipple assessment:   Breasts/Nipples  Left Breast: Filling  Right Breast: Filling  Left Nipple: Everted, Other (Comment) (short shank)  Right Nipple: Everted, Other (Comment) (short shank)  Intervention: Other (comment) (Review good latch/feed & support available)  Breastfeeding Progress: Not yet established  Reasons for not Breastfeeding: Infant medical condition (jaundice as per Mom)    OB Lactation Tools:    Other OB Lactation Tools  Feeding Devices: Pump, Syringe, Bottle    Breast Pump:    Breast Pump  Pump: Electric, Manual, Personal (has Spectra S2 @ bedside)  Pump  Review/Education: Milk storage  Initiated by: STAFF      [place] Assessment: sleepy    Feeding assessment: Mom has been bottle feeding formula     LATCH:  Latch: Grasps breast, tongue down, lips flanged, rhythmic sucking   Audible Swallowing: A few with stimulation   Type of Nipple: Everted (After stimulation) (short shank)   Comfort (Breast/Nipple): Soft/non-tender   Hold (Positioning): Partial assist, teach one side, mother does other, staff holds   LATCH Score: 8       Guy:                   Feeding recommendations:  breast feed on demand       06/10/25 1230   Patient Follow-Up   Lactation Consult Status 2   Follow-Up Type Inpatient;Call as needed   Other OB Lactation Documentation    Additional Problem Noted Follow up attempts X2 today; review good latch/feed & support available; asked about latch attempts,offered support  (Has Handouts; encouraged support)       Mom states she was not comfortable nursing while on phototherapy. States she will be working on that today. Reviewed @ 1015 Education on positioning and alignment. Mom is encouraged to:     - Bring baby up to the breast (use of pillows to elevate so baby's torso is against mom's breasts)   - Skin to skin for feedings with top hand exposed to show signs of satiation   - Chin deep into breast tissue (make baby look up to the nipple)   - nose aligned to the nipple   -Wait for wide gape, place chin behind the areola on the breast so nipple is at the nose. Once baby opens their mouth wide, the nipple will be aimed at the upper, back palate  - Cheeks should be touching breast, and baby should have a long neck   - Ear, shoulder, hip will be in alignment   - Deep, snug hold of baby up to the breast   - Every baby knows how to breathe at the breast. The tip of the nose may appear to touch the breast. Babies breathe from the side of the nasal passages. If baby can not breathe due to improper alignment, baby will unlatch    Rounding back around 1230. Mom  states she did NOT attempt nursing today AND  is NOT sure breastfeeding is for her. Emotional support given & reviewed feeding plan options & support available for Home.     Encouraged nursing parent to call for assistance, questions and concerns.  Extension number for inpatient lactation support provided.            Debi Campoverde RN 6/10/2025 2:09 PM

## 2025-06-10 NOTE — QUICK NOTE
BP in severe range x 2    Has already had additional 30 mg nifedipine xl and AM dose increased to 60    Will give IV labetalol now

## 2025-06-10 NOTE — ASSESSMENT & PLAN NOTE
"For 2 hour GTT 4-12wks post delivery.     Lab Results   Component Value Date    HGBA1C 5.3 04/03/2024       No results for input(s): \"POCGLU\" in the last 72 hours.      Blood Sugar Average: Last 72 hrs:      "

## 2025-06-10 NOTE — NURSING NOTE
IV labetalol administration delayed d/t need for new IV placement. IV placed and BP rechecked prior to administration of IV Labetalol. /78. Dr. Youssef notified. Labetalol held.

## 2025-06-10 NOTE — PROGRESS NOTES
"Progress Note - OB/GYN  Post-Partum Physician Note   Maribel Morelos 30 y.o. female MRN: [id number]  Unit/Bed#: -01 Encounter: [id number]  Assessment:  30 y.o. year-old , post-op/postpartum day #4  following 1LTCS complicated  by gestational HTN requiring up titration of medication.    Plan:  Assessment & Plan  S/P primary low transverse   - doing well postpartum   - routine postop care  - lovenox, ambulation and SCDs for VTE ppx  - Hgb 13.2 > 10.5  Diet controlled gestational diabetes mellitus (GDM) in third trimester [O24.410]  For 2 hour GTT 4-12wks post delivery.     Lab Results   Component Value Date    HGBA1C 5.3 2024       No results for input(s): \"POCGLU\" in the last 72 hours.      Blood Sugar Average: Last 72 hrs:      Gestational hypertension  - continue Procardia XL 60 mg daily  - will monitor BP today   - continue Lasix 20 mg po daily  Rh negative state in antepartum period  - s/p rhogam 25 given baby's Rh + status    _____________________________________________  Subjective:   No acute events overnight. Denies HA, visual changes, epigastric pain. Denies shortness of breath or chest pain. Ambulating and voiding without difficulty.  Good urine output. Minimal lochia.      Objective:   Vitals:    25 2129 25 2210 25 2237 06/10/25 0230   BP: 140/78 149/99 137/91 143/96   BP Location:  Right arm Right arm Right arm   Pulse:  98 101 (!) 108   Resp:   18 20   Temp:   98.2 °F (36.8 °C) (!) 97.4 °F (36.3 °C)   TempSrc:   Oral Oral   SpO2:    100%   Weight:       Height:         No intake or output data in the 24 hours ending 06/10/25 0803    Physical Exam:  General: AOx3, NAD  Lungs: CTAB  CV: RRR  Abdomen: soft, fundus firm below umbilicus, appropriately tender; incision under mepilex, c/d/I without erythema  Extremities: nontender ; +2 pitting edema b/l up to knees; pedal edema overal appears improved      Labs/Tests:   Lab Results   Component " "Value Date/Time    HGB 10.5 (L) 06/07/2025 09:33 AM     06/07/2025 09:33 AM    WBC 17.30 (H) 06/07/2025 09:33 AM    CREATININE 0.49 (L) 06/07/2025 09:33 AM    ALT 11 06/07/2025 09:33 AM    AST 26 06/07/2025 09:33 AM        Brief OB Lab review:  ABO Grouping   Date Value Ref Range Status   06/07/2025 A  Final      Rh Factor   Date Value Ref Range Status   06/07/2025 Negative  Final    No results found for: \"ANTIBODYSCR\"  No results found for: \"RUBM\"    MEDS:     Current Facility-Administered Medications:     acetaminophen (TYLENOL) tablet 650 mg, Q6H PRN    ascorbic acid (VITAMIN C) tablet 500 mg, BID    benzocaine-menthol-lanolin-aloe (DERMOPLAST) 20-0.5 % topical spray 1 Application, Q6H PRN    diphenhydrAMINE (BENADRYL) injection 25 mg, Q6H PRN    docusate sodium (COLACE) capsule 100 mg, BID    enoxaparin (LOVENOX) subcutaneous injection 40 mg, Daily    furosemide (LASIX) tablet 20 mg, Daily    hydrocortisone 1 % cream, 4x Daily PRN    ibuprofen (MOTRIN) tablet 600 mg, Q6H PRN    labetalol (NORMODYNE) injection 20 mg, Once    lactated ringers infusion, Continuous, Last Rate: Stopped (06/07/25 1727)    NIFEdipine (PROCARDIA XL) 24 hr tablet 60 mg, Daily    oxyCODONE (ROXICODONE) IR tablet 5 mg, Q4H PRN    ropivacaine 0.2% PCEA, Continuous    simethicone (MYLICON) chewable tablet 80 mg, 4x Daily PRN    witch hazel-glycerin (TUCKS) topical pad 1 Pad, Q4H PRN  Invasive Devices       Peripheral Intravenous Line  Duration             Peripheral IV 06/09/25 Dorsal (posterior);Right Hand <1 day                      Ana Merino MD  6/10/2025 8:03 AM               "

## 2025-06-10 NOTE — PLAN OF CARE
Problem: PAIN - ADULT  Goal: Verbalizes/displays adequate comfort level or baseline comfort level  Description: Interventions:  - Encourage patient to monitor pain and request assistance  - Assess pain using appropriate pain scale  - Administer analgesics as ordered based on type and severity of pain and evaluate response  - Implement non-pharmacological measures as appropriate and evaluate response  - Consider cultural and social influences on pain and pain management  - Notify physician/advanced practitioner if interventions unsuccessful or patient reports new pain  - Educate patient/family on pain management process including their role and importance of  reporting pain   - Provide non-pharmacologic/complimentary pain relief interventions  6/10/2025 1758 by Cassi Sweet RN  Outcome: Completed  6/10/2025 1041 by Cassi Sweet RN  Outcome: Progressing     Problem: INFECTION - ADULT  Goal: Absence or prevention of progression during hospitalization  Description: INTERVENTIONS:  - Assess and monitor for signs and symptoms of infection  - Monitor lab/diagnostic results  - Monitor all insertion sites, i.e. indwelling lines, tubes, and drains  - Monitor endotracheal if appropriate and nasal secretions for changes in amount and color  - [place] appropriate cooling/warming therapies per order  - Administer medications as ordered  - Instruct and encourage patient and family to use good hand hygiene technique  - Identify and instruct in appropriate isolation precautions for identified infection/condition  6/10/2025 1758 by Cassi Sweet RN  Outcome: Completed  6/10/2025 1041 by Cassi Sweet RN  Outcome: Progressing  Goal: Absence of fever/infection during neutropenic period  Description: INTERVENTIONS:  - Monitor WBC  - Perform strict hand hygiene  - Limit to healthy visitors only  - No plants, dried, fresh or silk flowers with michael in patient room  6/10/2025 1758 by Cassi Sweet RN  Outcome: Completed  6/10/2025 1041 by Cassi  KEVEN Sweet  Outcome: Progressing     Problem: SAFETY ADULT  Goal: Patient will remain free of falls  Description: INTERVENTIONS:  - Educate patient/family on patient safety including physical limitations  - Instruct patient to call for assistance with activity   - Consider consulting OT/PT to assist with strengthening/mobility based on AM PAC & JH-HLM score  - Consult OT/PT to assist with strengthening/mobility   - Keep Call bell within reach  - Keep bed low and locked with side rails adjusted as appropriate  - Keep care items and personal belongings within reach  - Initiate and maintain comfort rounds  - Make Fall Risk Sign visible to staff  -6/10/2025 1758 by Cassi Sweet RN  Outcome: Completed  6/10/2025 1041 by Cassi Sweet RN  Outcome: Progressing  Goal: Maintain or return to baseline ADL function  Description: INTERVENTIONS:  -  Assess patient's ability to carry out ADLs; assess patient's baseline for ADL function and identify physical deficits which impact ability to perform ADLs (bathing, care of mouth/teeth, toileting, grooming, dressing, etc.)  - Assess/evaluate cause of self-care deficits   - Assess range of motion  - Assess patient's mobility; develop plan if impaired  - Assess patient's need for assistive devices and provide as appropriate  - Encourage maximum independence but intervene and supervise when necessary  - Involve family in performance of ADLs  - Assess for home care needs following discharge   - Consider OT consult to assist with ADL evaluation and planning for discharge  - Provide patient education as appropriate  - Monitor functional capacity and physical performance, use of AM PAC & JH-HLM   - Monitor gait, balance and fatigue with ambulation    6/10/2025 1758 by Cassi Sweet RN  Outcome: Completed  6/10/2025 1041 by Cassi Sweet RN  Outcome: Progressing  Goal: Maintains/Returns to pre admission functional level  Description: INTERVENTIONS:  - Perform AM-PAC 6 Click Basic Mobility/ Daily  Activity assessment daily.  - Set and communicate daily mobility goal to care team and patient/family/caregiver.   - Collaborate with rehabilitation services on mobility goals if consulted  - Out of bed for toileting  - Record patient progress and toleration of activity level   6/10/2025 1758 by Cassi Sweet RN  Outcome: Completed  6/10/2025 1041 by Cassi Sweet RN  Outcome: Progressing     Problem: DISCHARGE PLANNING  Goal: Discharge to home or other facility with appropriate resources  Description: INTERVENTIONS:  - Identify barriers to discharge w/patient and caregiver  - Arrange for needed discharge resources and transportation as appropriate  - Identify discharge learning needs (meds, wound care, etc.)  - Arrange for interpretive services to assist at discharge as needed  - Refer to Case Management Department for coordinating discharge planning if the patient needs post-hospital services based on physician/advanced practitioner order or complex needs related to functional status, cognitive ability, or social support system  6/10/2025 1758 by Cassi Sweet RN  Outcome: Completed  6/10/2025 1041 by Cassi Sweet RN  Outcome: Progressing     Problem: POSTPARTUM  Goal: Experiences normal postpartum course  Description: INTERVENTIONS:  - Monitor maternal vital signs  - Assess uterine involution and lochia  6/10/2025 1758 by Cassi Sweet RN  Outcome: Completed  6/10/2025 1041 by Cassi Sweet RN  Outcome: Progressing  Goal: Appropriate maternal -  bonding  Description: INTERVENTIONS:  - Identify family support  - Assess for appropriate maternal/infant bonding   -Encourage maternal/infant bonding opportunities  - Referral to  or  as needed  6/10/2025 1758 by Cassi Sweet RN  Outcome: Completed  6/10/2025 1041 by Cassi Sweet RN  Outcome: Progressing  Goal: Establishment of infant feeding pattern  Description: INTERVENTIONS:  - Assess breast/bottle feeding  - Refer to lactation as needed  6/10/2025  1758 by Cassi Sweet RN  Outcome: Completed  6/10/2025 1041 by Cassi Sweet RN  Outcome: Progressing  Goal: Incision(s), wounds(s) or drain site(s) healing without S/S of infection  Description: INTERVENTIONS  - Assess and document dressing, incision, wound bed, drain sites and surrounding tissue  - Provide patient and family education  -6/10/2025 1758 by Cassi Sweet RN  Outcome: Completed  6/10/2025 1041 by Cassi Sweet RN  Outcome: Progressing

## 2025-06-10 NOTE — ASSESSMENT & PLAN NOTE
- continue Procardia XL 60 mg daily  - will monitor BP today   - continue Lasix 20 mg po daily   Detail Level: Detailed Quality 130: Documentation Of Current Medications In The Medical Record: Current Medications Documented

## 2025-06-11 ENCOUNTER — TELEPHONE (OUTPATIENT)
Age: 30
End: 2025-06-11

## 2025-06-11 ENCOUNTER — TRANSITIONAL CARE MANAGEMENT (OUTPATIENT)
Dept: INTERNAL MEDICINE CLINIC | Facility: CLINIC | Age: 30
End: 2025-06-11

## 2025-06-11 ENCOUNTER — NURSE TRIAGE (OUTPATIENT)
Age: 30
End: 2025-06-11

## 2025-06-11 DIAGNOSIS — O13.3 GESTATIONAL HYPERTENSION, THIRD TRIMESTER: Primary | ICD-10-CM

## 2025-06-11 PROCEDURE — TCMXX

## 2025-06-11 RX ORDER — FUROSEMIDE 20 MG/1
20 TABLET ORAL DAILY
Qty: 2 TABLET | Refills: 0 | Status: SHIPPED | OUTPATIENT
Start: 2025-06-11 | End: 2025-06-13

## 2025-06-11 NOTE — TELEPHONE ENCOUNTER
Patient was discharged from hospital 6/10. LTCS on 6/6. Required lasix 20 mg daily while inpatient for bp control. Patient wondering if she needed to continue for 2 more days while at home.

## 2025-06-11 NOTE — TELEPHONE ENCOUNTER
"REASON FOR CONVERSATION: No chief complaint on file.    SYMPTOMS: red spots on bilateral knees    OTHER HEALTH INFORMATION: LTCS on 6/6. Having some swelling, but denies pain, shortness of breath, chest pain.     PROTOCOL DISPOSITION: Home Care    CARE ADVICE PROVIDED: elevate, ice, continue diuretic. Call with worsening    PRACTICE FOLLOW-UP: n/a      Reason for Disposition   Mild localized rash    Answer Assessment - Initial Assessment Questions  1. APPEARANCE of RASH: \"Describe the rash.\"       Red splotches   2. LOCATION: \"Where is the rash located?\"       Bilateral knees  3. NUMBER: \"How many spots are there?\"       1 on each knee  4. SIZE: \"How big are the spots?\" (Inches, centimeters or compare to size of a coin)       Area of knee cap  5. ONSET: \"When did the rash start?\"       today  6. ITCHING: \"Does the rash itch?\" If Yes, ask: \"How bad is the itch?\"  (Scale 0-10; or none, mild, moderate, severe)      denies  7. PAIN: \"Does the rash hurt?\" If Yes, ask: \"How bad is the pain?\"  (Scale 0-10; or none, mild, moderate, severe)      Mild, but due to swelling  8. OTHER SYMPTOMS: \"Do you have any other symptoms?\" (e.g., fever)      denies  9. PREGNANCY: \"Is there any chance you are pregnant?\" \"When was your last menstrual period?\"      Post partum LTCS on 6/6    Protocols used: Rash or Redness - Localized-Adult-[place]    "

## 2025-06-11 NOTE — PROGRESS NOTES
Discharge education provided to pt and FOB, education on at home BP assessment provided, understanding verbalized, reading materials provided and reviewed with patient.  Time provided for questions, no further questions at this time. Patient ambulated off unit in stable condition with .

## 2025-06-11 NOTE — TELEPHONE ENCOUNTER
We typically have them do 5 days of Lasix. It appears that this was inadvertently not ordered at the time of discharge. I've sent a prescription to her pharmacy for 2 additional doses.     Juan Osborn MD  6/11/2025 9:56 AM

## 2025-06-12 NOTE — PROGRESS NOTES
The patient was seen today for an ultrasound.  Please see ultrasound report (located under Imaging) for additional details.   Thank you very much for allowing us to participate in the care of this very nice patient.  Should you have any questions, please do not hesitate to contact me.     Oziel Philippe MD FACOG  Attending Physician, Maternal-Fetal Medicine  [facility]

## 2025-06-13 ENCOUNTER — TELEPHONE (OUTPATIENT)
Age: 30
End: 2025-06-13

## 2025-06-13 ENCOUNTER — POSTPARTUM VISIT (OUTPATIENT)
Age: 30
End: 2025-06-13

## 2025-06-13 VITALS — DIASTOLIC BLOOD PRESSURE: 92 MMHG | SYSTOLIC BLOOD PRESSURE: 142 MMHG | BODY MASS INDEX: 40.85 KG/M2 | WEIGHT: 238 LBS

## 2025-06-13 DIAGNOSIS — O13.3 GESTATIONAL HYPERTENSION, THIRD TRIMESTER: Primary | ICD-10-CM

## 2025-06-13 RX ORDER — LABETALOL 200 MG/1
200 TABLET, FILM COATED ORAL 2 TIMES DAILY
Qty: 60 TABLET | Refills: 0 | Status: SHIPPED | OUTPATIENT
Start: 2025-06-13

## 2025-06-13 RX ORDER — FUROSEMIDE 20 MG/1
20 TABLET ORAL DAILY
Qty: 5 TABLET | Refills: 0 | Status: SHIPPED | OUTPATIENT
Start: 2025-06-13 | End: 2025-06-18

## 2025-06-13 NOTE — TELEPHONE ENCOUNTER
Attempted to contact patient after delivery of baby, pt unavailable. Pt delivered 6/6/25. [employer] msg sent 6/.  Pt unavailable.   Left message for pt to reach out via [employer] w/questions/concerns. PPV scheduled 7/7/25 w/Dr Thomas

## 2025-06-16 PROCEDURE — 88307 TISSUE EXAM BY PATHOLOGIST: CPT | Performed by: STUDENT IN AN ORGANIZED HEALTH CARE EDUCATION/TRAINING PROGRAM

## 2025-06-16 PROCEDURE — 88305 TISSUE EXAM BY PATHOLOGIST: CPT | Performed by: STUDENT IN AN ORGANIZED HEALTH CARE EDUCATION/TRAINING PROGRAM

## 2025-06-16 NOTE — TELEPHONE ENCOUNTER
POSTPARTUM PHONE CALL ASSESSMENT    Date of Delivery: 25    Delivering Provider: Dr Youssef    Mode:     Delivery Notes/Complications: gHTN    How are you doing physically/emotionally? Patient states she is doing well.     Breastfeeding/Formula/Both? Breast and bottle feeding    Do you still have bleeding? yes  If so, how much/how severe? light vaginal bleeding and moderate vaginal bleeding    Do you have any pain? no  If so, how much/how severe? no pain    Regular BMs/Urination? yes    Do you have any other questions or concerns for us or your provider? no     Have you scheduled the pediatrician appointment with pediatrician? yes    Do you have a postpartum visit scheduled? yes  Date scheduled: 25 Provider:  Dr Thomas

## 2025-06-16 NOTE — PROGRESS NOTES
Name: Maribel Morelos      : 1995      MRN: [id number]  Encounter Provider: eGorgia Stephen MD  Encounter Date: 2025   Encounter department: [facility] [place]  :  Assessment & Plan  Gestational hypertension, third trimester  Continue Procardia, but add labetalol.   Once able to wean will try and wean Procardia first due to s/e.    RTO early next week for repeat check.   One additional course of lasix due to ongoing tense edema.     Orders:    labetalol (NORMODYNE) 200 mg tablet; Take 1 tablet (200 mg total) by mouth 2 (two) times a day    furosemide (LASIX) 20 mg tablet; Take 1 tablet (20 mg total) by mouth daily for 5 days        History of Present Illness   HPI  Maribel Morelos is a 30 y.o. female who presents for BP check.    Diagnosed with GHTN and went IOL, with ultimate c/s for arrest d/o.      BP elevated today with continued significant LE edema.   History obtained from: patient    Review of Systems       Objective   /92 (BP Location: Left arm)   Wt 108 kg (238 lb)   LMP 2024 (Exact Date)   Breastfeeding Yes Comment: pumping  BMI 40.85 kg/m²      Physical Exam  Vitals reviewed.   Constitutional:       General: She is not in acute distress.     Appearance: Normal appearance.   Abdominal:      Comments: Incision C/D/I  Mepilex removed     Musculoskeletal:         General: Swelling present.      Right lower leg: Edema present.      Left lower leg: Edema present.     Neurological:      Mental Status: She is alert.

## 2025-06-16 NOTE — ASSESSMENT & PLAN NOTE
Continue Procardia, but add labetalol.   Once able to wean will try and wean Procardia first due to s/e.    RTO early next week for repeat check.   One additional course of lasix due to ongoing tense edema.     Orders:    labetalol (NORMODYNE) 200 mg tablet; Take 1 tablet (200 mg total) by mouth 2 (two) times a day    furosemide (LASIX) 20 mg tablet; Take 1 tablet (20 mg total) by mouth daily for 5 days

## 2025-06-17 ENCOUNTER — CLINICAL SUPPORT (OUTPATIENT)
Age: 30
End: 2025-06-17

## 2025-06-17 VITALS
WEIGHT: 224.2 LBS | BODY MASS INDEX: 38.28 KG/M2 | SYSTOLIC BLOOD PRESSURE: 138 MMHG | DIASTOLIC BLOOD PRESSURE: 102 MMHG | HEIGHT: 64 IN

## 2025-06-17 DIAGNOSIS — O13.9 GESTATIONAL HYPERTENSION, ANTEPARTUM: Primary | ICD-10-CM

## 2025-06-17 PROCEDURE — 99024 POSTOP FOLLOW-UP VISIT: CPT

## 2025-06-17 NOTE — PROGRESS NOTES
"Follow up from patient:     \"Hello,  I took my blood pressure an hour after taking the Labetalol and it was 110/80.   Best,  Maribel\" at 6/17/25 11:12 AM   "

## 2025-06-17 NOTE — PROGRESS NOTES
BP in office today 138/102. Patient brought her own cuff which also read 134/103. Did not take medications yet this morning as she is supposed to take them at 10am. Discussed with Dr. Newman. Patient to go home and take meds and re-check BP at home and report reading by calling or sending a message to the office. No change in plan of care and to continue taking meds as previously discussed with Dr. Thomas. Follow up BP check scheduled next Wednesday for 1 hour after medication dosing. No other questions during todays visit.

## 2025-06-25 ENCOUNTER — CLINICAL SUPPORT (OUTPATIENT)
Age: 30
End: 2025-06-25

## 2025-06-25 VITALS
HEIGHT: 64 IN | WEIGHT: 225.2 LBS | SYSTOLIC BLOOD PRESSURE: 112 MMHG | DIASTOLIC BLOOD PRESSURE: 88 MMHG | BODY MASS INDEX: 38.45 KG/M2

## 2025-06-25 DIAGNOSIS — O13.9 GESTATIONAL HYPERTENSION, ANTEPARTUM: Primary | ICD-10-CM

## 2025-06-25 PROCEDURE — NURSE

## 2025-06-25 NOTE — PROGRESS NOTES
Patient in office today for follow up BP check. Reports normal readings at home. Stopped procardia and is taking labetalol 200mg BID unless her readings are below 110. Normal pressure in office today and follow up is schedule 7/7/25 with Dr. Thomas.     Patient also reports a fever of 100.3 last night and concerned for mastitis. Reports she did pump and felt fine after and fever was gone this morning. Says she occasionally gets a fever but breast engorgement goes away after pumping. No pain in the breast and no current redness. Discussed with Dr. Newman who stated this does not seem to be Mastitis as it is usually consistent. Advised to monitor the symptoms and if it is consistent or worse then to call the office. Referral given for lactation consult if needed and information sheet given with phone number to call baby and me. No other questions at this time per patient.

## 2025-07-03 ENCOUNTER — OFFICE VISIT (OUTPATIENT)
Dept: INTERNAL MEDICINE CLINIC | Facility: CLINIC | Age: 30
End: 2025-07-03

## 2025-07-03 VITALS
SYSTOLIC BLOOD PRESSURE: 118 MMHG | DIASTOLIC BLOOD PRESSURE: 82 MMHG | HEIGHT: 64 IN | OXYGEN SATURATION: 99 % | BODY MASS INDEX: 38.58 KG/M2 | WEIGHT: 226 LBS | RESPIRATION RATE: 16 BRPM | HEART RATE: 110 BPM

## 2025-07-03 DIAGNOSIS — H61.21 IMPACTED CERUMEN OF RIGHT EAR: Primary | ICD-10-CM

## 2025-07-03 NOTE — PROGRESS NOTES
"Name: Maribel Morelos      : 1995      MRN: [id number]  Encounter Provider: José Luis Maxwell MD  Encounter Date: 7/3/2025   Encounter department: [facility]  :  Assessment & Plan  Impacted cerumen of right ear  Ear heaviness, muffled hearing since last 6 days.  On examination impacted soft wax in right ear.  Irrigation done.  Tympanic membrane visualized, intact, no effusions behind tympanic membrane after cerumen disimpaction.              History of Present Illness   Patient presents to the clinic for complaint of ear heaviness and hearing loss.  She mentions that this started on  and has been ongoing for the last 6 days.  Nonprogressive.  She describes it as plugging of the ear that just occurred all of a sudden.  She tried Debrox at home but to no help.  Does not endorse any tinnitus, imbalance, history of TM perforation, TM tubes.    Ear Drainage   Associated symptoms include hearing loss. Pertinent negatives include no abdominal pain, coughing, rash, sore throat or vomiting.     Review of Systems   Constitutional:  Negative for chills and fever.   HENT:  Positive for hearing loss. Negative for ear pain and sore throat.    Eyes:  Negative for pain and visual disturbance.   Respiratory:  Negative for cough and shortness of breath.    Cardiovascular:  Negative for chest pain and palpitations.   Gastrointestinal:  Negative for abdominal pain and vomiting.   Genitourinary:  Negative for dysuria and hematuria.   Musculoskeletal:  Negative for arthralgias and back pain.   Skin:  Negative for color change and rash.   Neurological:  Negative for seizures and syncope.   All other systems reviewed and are negative.      Objective   /82 (BP Location: Left arm, Patient Position: Sitting, Cuff Size: Large)   Pulse (!) 110   Resp 16   Ht 5' 4\" (1.626 m)   Wt 103 kg (226 lb)   LMP 2024 (Exact Date)   SpO2 99%   BMI 38.79 kg/m²      Physical Exam  Vitals and nursing " note reviewed.   Constitutional:       General: She is not in acute distress.     Appearance: She is well-developed.   HENT:      Head: Normocephalic and atraumatic.      Right Ear: There is impacted cerumen.     Eyes:      Conjunctiva/sclera: Conjunctivae normal.       Cardiovascular:      Rate and Rhythm: Normal rate and regular rhythm.      Heart sounds: No murmur heard.  Pulmonary:      Effort: Pulmonary effort is normal. No respiratory distress.      Breath sounds: Normal breath sounds.   Abdominal:      Palpations: Abdomen is soft.      Tenderness: There is no abdominal tenderness.     Musculoskeletal:         General: No swelling.      Cervical back: Neck supple.     Skin:     General: Skin is warm and dry.      Capillary Refill: Capillary refill takes less than 2 seconds.     Neurological:      Mental Status: She is alert.     Psychiatric:         Mood and Affect: Mood normal.       Administrative Statements   I have spent a total time of 45 minutes in caring for this patient on the day of the visit/encounter including Diagnostic results, Prognosis, Risks and benefits of tx options, Instructions for management, Patient and family education, Importance of tx compliance, Risk factor reductions, Impressions, Counseling / Coordination of care, Documenting in the medical record, Reviewing/placing orders in the medical record (including tests, medications, and/or procedures), Obtaining or reviewing history  , Communicating with other healthcare professionals , and manual cerumen disimpaction.

## 2025-07-05 DIAGNOSIS — O13.3 GESTATIONAL HYPERTENSION, THIRD TRIMESTER: ICD-10-CM

## 2025-07-07 ENCOUNTER — POSTPARTUM VISIT (OUTPATIENT)
Age: 30
End: 2025-07-07

## 2025-07-07 VITALS — WEIGHT: 231.8 LBS | SYSTOLIC BLOOD PRESSURE: 138 MMHG | DIASTOLIC BLOOD PRESSURE: 98 MMHG | BODY MASS INDEX: 39.79 KG/M2

## 2025-07-07 DIAGNOSIS — O13.3 GESTATIONAL HYPERTENSION, THIRD TRIMESTER: ICD-10-CM

## 2025-07-07 PROBLEM — O24.410 DIET CONTROLLED GESTATIONAL DIABETES MELLITUS (GDM) IN THIRD TRIMESTER: Status: RESOLVED | Noted: 2025-04-16 | Resolved: 2025-07-07

## 2025-07-07 PROBLEM — R03.0 ELEVATED BP WITHOUT DIAGNOSIS OF HYPERTENSION: Status: RESOLVED | Noted: 2024-07-15 | Resolved: 2025-07-07

## 2025-07-07 PROBLEM — O36.8130 DECREASED FETAL MOVEMENT AFFECTING MANAGEMENT OF PREGNANCY IN THIRD TRIMESTER: Status: RESOLVED | Noted: 2025-06-02 | Resolved: 2025-07-07

## 2025-07-07 PROBLEM — Z67.91 RH NEGATIVE STATE IN ANTEPARTUM PERIOD: Status: RESOLVED | Noted: 2025-06-08 | Resolved: 2025-07-07

## 2025-07-07 PROBLEM — O36.63X0 FETAL MACROSOMIA DURING PREGNANCY IN THIRD TRIMESTER: Status: RESOLVED | Noted: 2025-05-23 | Resolved: 2025-07-07

## 2025-07-07 PROBLEM — O26.899 RH NEGATIVE STATE IN ANTEPARTUM PERIOD: Status: RESOLVED | Noted: 2025-06-08 | Resolved: 2025-07-07

## 2025-07-07 PROBLEM — O99.213 OBESITY COMPLICATING PREGNANCY, THIRD TRIMESTER: Status: RESOLVED | Noted: 2024-07-15 | Resolved: 2025-07-07

## 2025-07-07 PROCEDURE — 99024 POSTOP FOLLOW-UP VISIT: CPT | Performed by: OBSTETRICS & GYNECOLOGY

## 2025-07-07 RX ORDER — LABETALOL 200 MG/1
TABLET, FILM COATED ORAL
Qty: 180 TABLET | Refills: 1 | Status: SHIPPED | OUTPATIENT
Start: 2025-07-07

## 2025-07-07 NOTE — PROGRESS NOTES
Name: Maribel Morelos      : 1995      MRN: [id number]  Encounter Provider: Georgia Stephen MD  Encounter Date: 2025   Encounter department: [facility] [place]  :  Assessment & Plan  Postpartum examination following  delivery  Meeting appropriate milestones.  RTO for annual exam.        Gestational hypertension, third trimester  No current medication.   Will have her RTO in 2 weeks for recheck.            History of Present Illness   30 y.o. female here for postpartum visit.  She is s/p  on 25 .       Gender: male   Weight: 7lb 8.3oz  Her lochia has resolved.    She is Bottle feeding without problems.  Stopped about a week and a half ago.  No engorgement issues at this time.  Discussed breast care.  Rodolfo was struggling with weight gain and spit up - he is doing much better on formula.  She is comfortable with this decision.   She denies postpartum blues/depression.  Her EPDS is 4.      Last Pap: 22 - NILM    Plans condoms for contraception.       Maribel Morelos is a 30 y.o. female who presents for postpartum visit.       Review of Systems       Objective   Wt 105 kg (231 lb 12.8 oz)   LMP 2024 (Exact Date)   Breastfeeding No   BMI 39.79 kg/m²      Physical Exam  Vitals reviewed.   Constitutional:       General: She is not in acute distress.     Appearance: Normal appearance.   Abdominal:      Comments: Incision C/D/I - well healing     Neurological:      Mental Status: She is alert.

## 2025-07-21 ENCOUNTER — CLINICAL SUPPORT (OUTPATIENT)
Age: 30
End: 2025-07-21

## 2025-07-21 VITALS
DIASTOLIC BLOOD PRESSURE: 82 MMHG | WEIGHT: 231 LBS | HEIGHT: 64 IN | SYSTOLIC BLOOD PRESSURE: 126 MMHG | BODY MASS INDEX: 39.44 KG/M2

## 2025-07-21 DIAGNOSIS — O13.9 GESTATIONAL HYPERTENSION, ANTEPARTUM: Primary | ICD-10-CM

## 2025-07-21 PROCEDURE — NURSE

## 2025-07-21 NOTE — PROGRESS NOTES
"BP check    Delivered on 6/6/25    Gender: Boy \"Rodolfo\"    72lbs 8.3 oz  Delivery Type: C/S    Delivered by Dr. Youssef  Bottle feeding only    Stopped Procardia  Stopped Taking labetalol  BP readings at home are WNL   Reports no headaches, dizziness or lightheadedness    Advised to follow up for routine 3 month yearly and to call with any changes or symptoms.   "